# Patient Record
Sex: FEMALE | Race: WHITE | NOT HISPANIC OR LATINO | Employment: UNEMPLOYED | URBAN - METROPOLITAN AREA
[De-identification: names, ages, dates, MRNs, and addresses within clinical notes are randomized per-mention and may not be internally consistent; named-entity substitution may affect disease eponyms.]

---

## 2017-04-06 ENCOUNTER — OFFICE VISIT (OUTPATIENT)
Dept: FAMILY MEDICINE | Facility: CLINIC | Age: 57
End: 2017-04-06

## 2017-04-06 VITALS
SYSTOLIC BLOOD PRESSURE: 142 MMHG | DIASTOLIC BLOOD PRESSURE: 90 MMHG | BODY MASS INDEX: 23.15 KG/M2 | WEIGHT: 125.8 LBS | HEIGHT: 62 IN | HEART RATE: 71 BPM | TEMPERATURE: 97.9 F | OXYGEN SATURATION: 98 %

## 2017-04-06 DIAGNOSIS — F33.1 MODERATE EPISODE OF RECURRENT MAJOR DEPRESSIVE DISORDER (H): Primary | ICD-10-CM

## 2017-04-06 DIAGNOSIS — G47.00 INSOMNIA, UNSPECIFIED: ICD-10-CM

## 2017-04-06 DIAGNOSIS — Z96.649 STATUS POST HIP REPLACEMENT, UNSPECIFIED LATERALITY: ICD-10-CM

## 2017-04-06 DIAGNOSIS — I10 ESSENTIAL HYPERTENSION, BENIGN: ICD-10-CM

## 2017-04-06 DIAGNOSIS — F41.1 GAD (GENERALIZED ANXIETY DISORDER): ICD-10-CM

## 2017-04-06 PROCEDURE — 99213 OFFICE O/P EST LOW 20 MIN: CPT | Performed by: FAMILY MEDICINE

## 2017-04-06 RX ORDER — TRAZODONE HYDROCHLORIDE 50 MG/1
50 TABLET, FILM COATED ORAL
Qty: 90 TABLET | Refills: 1 | Status: SHIPPED | OUTPATIENT
Start: 2017-04-06 | End: 2017-09-06

## 2017-04-06 RX ORDER — LORAZEPAM 0.5 MG/1
0.5 TABLET ORAL EVERY 6 HOURS PRN
Qty: 30 TABLET | Refills: 0 | Status: SHIPPED | OUTPATIENT
Start: 2017-04-06 | End: 2017-05-08

## 2017-04-06 ASSESSMENT — ANXIETY QUESTIONNAIRES
GAD7 TOTAL SCORE: 5
5. BEING SO RESTLESS THAT IT IS HARD TO SIT STILL: NOT AT ALL
2. NOT BEING ABLE TO STOP OR CONTROL WORRYING: SEVERAL DAYS
3. WORRYING TOO MUCH ABOUT DIFFERENT THINGS: SEVERAL DAYS
6. BECOMING EASILY ANNOYED OR IRRITABLE: SEVERAL DAYS
IF YOU CHECKED OFF ANY PROBLEMS ON THIS QUESTIONNAIRE, HOW DIFFICULT HAVE THESE PROBLEMS MADE IT FOR YOU TO DO YOUR WORK, TAKE CARE OF THINGS AT HOME, OR GET ALONG WITH OTHER PEOPLE: SOMEWHAT DIFFICULT
1. FEELING NERVOUS, ANXIOUS, OR ON EDGE: SEVERAL DAYS
7. FEELING AFRAID AS IF SOMETHING AWFUL MIGHT HAPPEN: NOT AT ALL

## 2017-04-06 ASSESSMENT — PATIENT HEALTH QUESTIONNAIRE - PHQ9: 5. POOR APPETITE OR OVEREATING: SEVERAL DAYS

## 2017-04-06 NOTE — PROGRESS NOTES
SUBJECTIVE:                                                    Nathalie Mendosa is a 57 year old female who presents to clinic today for the following health issues:        Hypertension Follow-up      Outpatient blood pressures are being checked at home.  Results are normal.    Low Salt Diet: no added salt       Depression and Anxiety Follow-Up    Status since last visit: Worsened -has been off sertraline for a year but getting worse again-seems to happen this time of year as she realizes what she can't do in active summer months    Other associated symptoms:None    Complicating factors:     Significant life event: Yes-  Still struggles with sequelae of hip replacement     Current substance abuse: None    PHQ-9 SCORE 6/2/2015 2/23/2016 11/9/2016   Total Score 2 - -   Total Score - 2 2     ANGI-7 SCORE 6/18/2015 2/23/2016 11/9/2016   Total Score 6 - -   Total Score - 1 0        PHQ-9  English      PHQ-9   Any Language     GAD7       Amount of exercise or physical activity: 2-3 days/week for an average of 15-30 minutes    Problems taking medications regularly: No    Medication side effects: none    Diet: regular (no restrictions)          Problem list and histories reviewed & adjusted, as indicated.  Additional history: as documented    Patient Active Problem List   Diagnosis     OA (osteoarthritis)     Symptomatic menopausal or female climacteric states     Essential hypertension, benign     Status post hip replacement     Anxiety state     Health Care Home     Pain in joint, pelvic region and thigh     Major depressive disorder     ACP (advance care planning)     Abnormal glucose     ANGI (generalized anxiety disorder)     Insomnia, unspecified type     Past Surgical History:   Procedure Laterality Date     C EYE EXAM ESTABLISHED PT  2009     C MAMMOGRAM, SCREENING  2008     C REVISE TOTAL HIP REPLACEMENT  7/2012    Dr Jeanette ALEMAN TOTAL ABDOM HYSTERECTOMY      Hysterectomy, Total Abdominal  BSO     C TOTAL HIP  "ARTHROPLASTY  9/9/2011    Hip Replacement, Total  L     HC LAPAROSCOPIC MYOMECTOMY, 1 - 4 INTRAMURAL MYOMAS =<250 GM       HCL PAP SMEAR  2008    GYN     SEXA BONE DENS PERIPHERAL  2007     TEST NOT FOUND      In vitro with # proceedures       Social History   Substance Use Topics     Smoking status: Never Smoker     Smokeless tobacco: Never Used     Alcohol use 1.0 oz/week     2 drink(s) per week      Comment: occasional     Family History   Problem Relation Age of Onset     C.A.D. Mother      DIABETES No family hx of      Breast Cancer Maternal Aunt      Cancer - colorectal Paternal Grandmother      Psychotic Disorder Father      anxiety/depression         Allergies   Allergen Reactions     Morphine Swelling     Sulfa Drugs      Septra rash and hives noted on  visit of 3-12-00.     Recent Labs   Lab Test  02/23/16   0943  07/19/14   0956  05/07/13   1010   09/12/11   0700  09/11/11   0635   LDL  123  140*  135*   --    --    --    HDL  67  89  85   --    --    --    TRIG  103  96  105   --    --    --    ALT  11  12  12   --    --    --    CR  0.82  0.82  0.82   < >  0.73  0.85   GFRESTIMATED  79  81  81   --   84  70   GFRESTBLACK   --    --    --    --   >90  85   POTASSIUM  3.9  4.0  4.1   --   4.1  4.4    < > = values in this interval not displayed.      BP Readings from Last 3 Encounters:   04/06/17 142/90   11/09/16 132/88   02/23/16 118/78    Wt Readings from Last 3 Encounters:   04/06/17 57.1 kg (125 lb 12.8 oz)   11/09/16 56.7 kg (125 lb)   02/23/16 58.1 kg (128 lb)                    ROS:  Constitutional, HEENT, cardiovascular, pulmonary, gi and gu systems are negative, except as otherwise noted.    OBJECTIVE:                                                    /90 (BP Location: Left arm, Patient Position: Chair, Cuff Size: Adult Regular)  Pulse 71  Temp 97.9  F (36.6  C) (Oral)  Ht 1.575 m (5' 2\")  Wt 57.1 kg (125 lb 12.8 oz)  SpO2 98%  BMI 23.01 kg/m2  Body mass index is 23.01 kg/(m^2). "   GENERAL: healthy, alert and no distress  RESP: lungs clear to auscultation - no rales, rhonchi or wheezes  CV: regular rate and rhythm, normal S1 S2, no S3 or S4, no murmur, click or rub, no peripheral edema and peripheral pulses strong  ABDOMEN: soft, nontender, no hepatosplenomegaly, no masses and bowel sounds normal  MS: no gross musculoskeletal defects noted, no edema  PSYCH: tearful and anxious    Diagnostic Test Results:  none      ASSESSMENT:                                                        PLAN:                                                    (F33.1) Moderate episode of recurrent major depressive disorder (H)  (primary encounter diagnosis)  Comment: recurrence of symptoms -seems to be hard for her to accept activity limitations post hip issues in summer active outdoor months  Plan: sertraline (ZOLOFT) 50 MG tablet        Restart, I reviewed the risks, benefits, and possible side effects of the medication.  The patient had an opportunity to ask any questions regarding the treatment plan. The patient was encouraged to call my office if any problems.     We discussed the new findings of possibly increased suicidal risk in teens and young adults beginning treatment with SSRI's. Pt is aware of risk and will seek help immediately if develops any suicidal thoughts.     (F41.1) ANGI (generalized anxiety disorder)  Comment: as above- some panicky moments-we agree to use some xanax while waiting for sertraline to take affect  Plan: sertraline (ZOLOFT) 50 MG tablet, LORazepam         (ATIVAN) 0.5 MG tablet        I reviewed the risks, benefits, and possible side effects of the medication.  The patient had an opportunity to ask any questions regarding the treatment plan. The patient was encouraged to call my office if any problems.     (Z96.489) Status post hip replacement, unspecified laterality  Comment: Patient is having persistent symptoms despite previous therapies.   Plan:     (G47.00) Insomnia,  "unspecified  Comment: stable symptomatically   Plan: traZODone (DESYREL) 50 MG tablet        continue current medications at current doses     (I10) Essential hypertension, benign  Comment: borderline today but worked up as above  Plan: Check blood pressure readings outside of the clinic several times per week, write down values, and follow up if elevated within the next several weeks. Blood pressure can be checked at the firestation, drugstore,  or any valid site.     BMI:   Estimated body mass index is 23.01 kg/(m^2) as calculated from the following:    Height as of this encounter: 1.575 m (5' 2\").    Weight as of this encounter: 57.1 kg (125 lb 12.8 oz).         FUTURE APPOINTMENTS:       - Follow-up visit in 3-6 mo    Sandoval Lamb MD  [facility]      "

## 2017-04-06 NOTE — MR AVS SNAPSHOT
After Visit Summary   4/6/2017    Nathalie Mendosa    MRN: [id number]           Patient Information     Date Of Birth          1960        Visit Information        Provider Department      4/6/2017 11:30 AM Sandoval Lamb MD [facility]        Today's Diagnoses     Moderate episode of recurrent major depressive disorder (H)    -  1    ANGI (generalized anxiety disorder)        Status post hip replacement, unspecified laterality        Insomnia, unspecified        Essential hypertension, benign           Follow-ups after your visit        Follow-up notes from your care team     Return in about 6 months (around 10/6/2017).      Who to contact     If you have questions or need follow up information about today's clinic visit or your schedule please contact [facility] directly at [phone].  Normal or non-critical lab and imaging results will be communicated to you by MyChart, letter or phone within 4 business days after the clinic has received the results. If you do not hear from us within 7 days, please contact the clinic through MyChart or phone. If you have a critical or abnormal lab result, we will notify you by phone as soon as possible.  Submit refill requests through [facility] or call your pharmacy and they will forward the refill request to us. Please allow 3 business days for your refill to be completed.          Additional Information About Your Visit        MyChart Information     [facility] gives you secure access to your electronic health record. If you see a primary care provider, you can also send messages to your care team and make appointments. If you have questions, please call your primary care clinic.  If you do not have a primary care provider, please call [phone] and they will assist you.        Care EveryWhere ID     This is your Care EveryWhere ID. This could be used by other organizations to access your [place]  "medical records  [id number]        Your Vitals Were     Pulse Temperature Height Pulse Oximetry BMI (Body Mass Index)       71 97.9  F (36.6  C) (Oral) 1.575 m (5' 2\") 98% 23.01 kg/m2        Blood Pressure from Last 3 Encounters:   04/06/17 142/90   11/09/16 132/88   02/23/16 118/78    Weight from Last 3 Encounters:   04/06/17 57.1 kg (125 lb 12.8 oz)   11/09/16 56.7 kg (125 lb)   02/23/16 58.1 kg (128 lb)              Today, you had the following     No orders found for display         Where to get your medicines      These medications were sent to [facility] [id number] [place] [address]  [address] [facility] [address] [facility] [address]     Phone:  [phone]     sertraline 50 MG tablet    traZODone 50 MG tablet         Some of these will need a paper prescription and others can be bought over the counter.  Ask your nurse if you have questions.     Bring a paper prescription for each of these medications     LORazepam 0.5 MG tablet          Primary Care Provider Office Phone # Fax #    Sandoval Lamb [phone]       [facility] [address] [address]        Thank you!     Thank you for choosing [facility]  for your care. Our goal is always to provide you with excellent care. Hearing back from our patients is one way we can continue to improve our services. Please take a few minutes to complete the written survey that you may receive in the mail after your visit with us. Thank you!             Your Updated Medication List - Protect others around you: Learn how to safely use, store and throw away your medicines at www.disposemymeds.org.          This list is accurate as of: 4/6/17 12:00 PM.  Always use your most recent med list.                   Brand Name Dispense Instructions for use    amoxicillin 500 MG capsule    AMOXIL    4 capsule    4 tabs one hour before " dentist appointment       estradiol 0.05 MG/24HR BIW patch    VIVELLE-DOT    24 patch    Place 1 patch onto the skin twice a week       ibuprofen 800 MG tablet    ADVIL/MOTRIN    42 tablet    Take 1 tablet (800 mg) by mouth every 8 hours as needed for pain       LORazepam 0.5 MG tablet    ATIVAN    30 tablet    Take 1 tablet (0.5 mg) by mouth every 6 hours as needed for anxiety       MULTI-VITAMIN DAILY PO      Take  by mouth.       sertraline 50 MG tablet    ZOLOFT    90 tablet    Take 1 tablet (50 mg) by mouth daily       traMADol 50 MG tablet    ULTRAM         traZODone 50 MG tablet    DESYREL    90 tablet    Take 1 tablet (50 mg) by mouth nightly as needed for sleep

## 2017-04-06 NOTE — NURSING NOTE
Nathalie Mendosa is here today for a non fasting med check    Pre-Visit Screening :  Immunizations : up to date  Colonoscopy : is up to date  Mammogram : is up to date  Asthma Action Test/Plan : NA  PHQ9/GAD7 :  Completed Today  Pulse - regular  My Chart - accepts    CLASSIFICATION OF OVERWEIGHT AND OBESITY BY BMI                         Obesity Class           BMI(kg/m2)  Underweight                                    < 18.5  Normal                                         18.5-24.9  Overweight                                     25.0-29.9  OBESITY                     I                  30.0-34.9                              II                 35.0-39.9  EXTREME OBESITY             III                >40                             Patient's  BMI Body mass index is 23.01 kg/(m^2).  http://hin.nhlbi.nih.gov/menuplanner/menu.cgi  Questioned patient about current smoking habits.  Pt. has never smoked.  Shy Mulligan, CMA

## 2017-04-07 ASSESSMENT — ANXIETY QUESTIONNAIRES: GAD7 TOTAL SCORE: 5

## 2017-04-07 ASSESSMENT — PATIENT HEALTH QUESTIONNAIRE - PHQ9: SUM OF ALL RESPONSES TO PHQ QUESTIONS 1-9: 3

## 2017-04-18 ENCOUNTER — TRANSFERRED RECORDS (OUTPATIENT)
Dept: FAMILY MEDICINE | Facility: CLINIC | Age: 57
End: 2017-04-18

## 2017-05-08 ENCOUNTER — MYC REFILL (OUTPATIENT)
Dept: FAMILY MEDICINE | Facility: CLINIC | Age: 57
End: 2017-05-08

## 2017-05-08 DIAGNOSIS — F41.1 GAD (GENERALIZED ANXIETY DISORDER): ICD-10-CM

## 2017-05-08 RX ORDER — LORAZEPAM 0.5 MG/1
0.5 TABLET ORAL EVERY 6 HOURS PRN
Qty: 15 TABLET | Refills: 0 | Status: SHIPPED | OUTPATIENT
Start: 2017-05-08 | End: 2017-06-15

## 2017-05-08 NOTE — TELEPHONE ENCOUNTER
Patient is requesting a refill of the following:  Pending Prescriptions:                       Disp   Refills    LORazepam (ATIVAN) 0.5 MG tablet          30 tab*0            Sig: Take 1 tablet (0.5 mg) by mouth every 6 hours as           needed for anxiety    Last Refill: 04/06/17  Last Refill Quantity: 30  Last Office Visit Pertaining to Medication: 04/06/17  Recommended Follow Up from Last OV: 3-6 months  Scheduled Office Visit: None     Please Close Encounter If Approved.    Thank You,  Shy Mulligan, JESSE

## 2017-06-20 DIAGNOSIS — N95.1 SYMPTOMATIC MENOPAUSAL OR FEMALE CLIMACTERIC STATES: ICD-10-CM

## 2017-06-20 RX ORDER — ESTRADIOL 0.05 MG/D
PATCH, EXTENDED RELEASE TRANSDERMAL
Qty: 8 PATCH | Refills: 0 | OUTPATIENT
Start: 2017-06-20

## 2017-06-21 RX ORDER — ESTRADIOL 0.05 MG/D
PATCH, EXTENDED RELEASE TRANSDERMAL
Qty: 24 PATCH | Refills: 1 | Status: SHIPPED | OUTPATIENT
Start: 2017-06-21 | End: 2017-12-05

## 2017-06-21 NOTE — TELEPHONE ENCOUNTER
Pt is requesting the following prescription.     Pending Prescriptions:                       Disp   Refills    estradiol (VIVELLE-DOT) 0.05 MG/24HR BIW *24 pat*1            Sig: PLACE 1 PATCH ON THE SKIN TWICE A WEEK    Last written refill: 1/5/2017    Please advise.     Renita.JESSE Perez ([facility])

## 2017-09-06 DIAGNOSIS — G47.00 INSOMNIA, UNSPECIFIED: ICD-10-CM

## 2017-09-06 RX ORDER — TRAZODONE HYDROCHLORIDE 50 MG/1
TABLET, FILM COATED ORAL
Qty: 90 TABLET | Refills: 0 | OUTPATIENT
Start: 2017-09-06

## 2017-09-06 RX ORDER — TRAZODONE HYDROCHLORIDE 50 MG/1
50 TABLET, FILM COATED ORAL
Qty: 30 TABLET | Refills: 0 | COMMUNITY
Start: 2017-09-06 | End: 2017-10-09

## 2017-09-06 NOTE — TELEPHONE ENCOUNTER
[facility]  Trazodone  Pt due for a non fasting ov  es  [phone] (home) [phone] (work)

## 2017-10-09 ENCOUNTER — OFFICE VISIT (OUTPATIENT)
Dept: FAMILY MEDICINE | Facility: CLINIC | Age: 57
End: 2017-10-09

## 2017-10-09 VITALS
HEART RATE: 70 BPM | TEMPERATURE: 99.3 F | WEIGHT: 127 LBS | BODY MASS INDEX: 22.5 KG/M2 | HEIGHT: 63 IN | OXYGEN SATURATION: 98 % | SYSTOLIC BLOOD PRESSURE: 132 MMHG | DIASTOLIC BLOOD PRESSURE: 84 MMHG

## 2017-10-09 DIAGNOSIS — Z23 NEED FOR VACCINATION: ICD-10-CM

## 2017-10-09 DIAGNOSIS — F41.1 GAD (GENERALIZED ANXIETY DISORDER): ICD-10-CM

## 2017-10-09 DIAGNOSIS — Z11.59 NEED FOR HEPATITIS C SCREENING TEST: ICD-10-CM

## 2017-10-09 DIAGNOSIS — I10 ESSENTIAL HYPERTENSION, BENIGN: ICD-10-CM

## 2017-10-09 DIAGNOSIS — Z96.642 STATUS POST LEFT HIP REPLACEMENT: ICD-10-CM

## 2017-10-09 DIAGNOSIS — Z13.220 SCREENING FOR LIPOID DISORDERS: ICD-10-CM

## 2017-10-09 DIAGNOSIS — G47.00 INSOMNIA, UNSPECIFIED TYPE: Primary | ICD-10-CM

## 2017-10-09 DIAGNOSIS — F33.1 MODERATE EPISODE OF RECURRENT MAJOR DEPRESSIVE DISORDER (H): ICD-10-CM

## 2017-10-09 PROCEDURE — 90686 IIV4 VACC NO PRSV 0.5 ML IM: CPT | Performed by: FAMILY MEDICINE

## 2017-10-09 PROCEDURE — 99213 OFFICE O/P EST LOW 20 MIN: CPT | Mod: 25 | Performed by: FAMILY MEDICINE

## 2017-10-09 PROCEDURE — 90471 IMMUNIZATION ADMIN: CPT | Performed by: FAMILY MEDICINE

## 2017-10-09 RX ORDER — TRAZODONE HYDROCHLORIDE 50 MG/1
50 TABLET, FILM COATED ORAL
Qty: 90 TABLET | Refills: 1 | Status: SHIPPED | OUTPATIENT
Start: 2017-10-09 | End: 2018-04-10

## 2017-10-09 ASSESSMENT — ANXIETY QUESTIONNAIRES
3. WORRYING TOO MUCH ABOUT DIFFERENT THINGS: NOT AT ALL
5. BEING SO RESTLESS THAT IT IS HARD TO SIT STILL: NOT AT ALL
IF YOU CHECKED OFF ANY PROBLEMS ON THIS QUESTIONNAIRE, HOW DIFFICULT HAVE THESE PROBLEMS MADE IT FOR YOU TO DO YOUR WORK, TAKE CARE OF THINGS AT HOME, OR GET ALONG WITH OTHER PEOPLE: NOT DIFFICULT AT ALL
7. FEELING AFRAID AS IF SOMETHING AWFUL MIGHT HAPPEN: NOT AT ALL
1. FEELING NERVOUS, ANXIOUS, OR ON EDGE: NOT AT ALL
2. NOT BEING ABLE TO STOP OR CONTROL WORRYING: NOT AT ALL
GAD7 TOTAL SCORE: 0
6. BECOMING EASILY ANNOYED OR IRRITABLE: NOT AT ALL

## 2017-10-09 ASSESSMENT — PATIENT HEALTH QUESTIONNAIRE - PHQ9
SUM OF ALL RESPONSES TO PHQ QUESTIONS 1-9: 1
5. POOR APPETITE OR OVEREATING: NOT AT ALL

## 2017-10-09 NOTE — NURSING NOTE
Nathalie Mendosa is here today for a medication recheck.    Pre-Visit Screening :  Immunizations : up to date, flu shot today  Colonoscopy : is up to date  Mammogram : is up to date  Asthma Action Test/Plan : NA  PHQ9/GAD7 :  Completed Today    Pulse - regular  My Chart - accepts    CLASSIFICATION OF OVERWEIGHT AND OBESITY BY BMI                         Obesity Class           BMI(kg/m2)  Underweight                                    < 18.5  Normal                                         18.5-24.9  Overweight                                     25.0-29.9  OBESITY                     I                  30.0-34.9                              II                 35.0-39.9  EXTREME OBESITY             III                >40                             Patient's  BMI Body mass index is 22.86 kg/(m^2).  http://hin.nhlbi.nih.gov/menuplanner/menu.cgi  Questioned patient about current smoking habits.  Pt. has never smoked.    Shy Mulligan, CMA

## 2017-10-09 NOTE — PROGRESS NOTES
SUBJECTIVE:                                                    Nathalie Mendosa is a 57 year old female who presents to clinic today for the following health issues:      Hypertension Follow-up-not on meds       Outpatient blood pressures are not being checked.    Low Salt Diet: no added salt    Depression and Anxiety Follow-Up    Status since last visit: No change    Other associated symptoms:None    Complicating factors:     Significant life event: No     Current substance abuse: None    PHQ-9 Score and MyChart F/U Questions 2/23/2016 11/9/2016 4/6/2017   Total Score 2 2 3   Q9: Suicide Ideation Not at all Not at all Not at all     ANGI-7 SCORE 2/23/2016 11/9/2016 4/6/2017   Total Score - - -   Total Score 1 0 5       PHQ-9  English  PHQ-9   Any Language  GAD7  Suicide Assessment Five-step Evaluation and Treatment (SAFE-T)        Amount of exercise or physical activity: 4-5 days/week for an average of 15-30 minutes    Problems taking medications regularly: No    Medication side effects: none  Diet: regular (no restrictions)      Hip pain still a challenge- no further therapy options- is having to learn to handle    Problem list and histories reviewed & adjusted, as indicated.  Additional history: as documented    Patient Active Problem List   Diagnosis     OA (osteoarthritis)     Symptomatic menopausal or female climacteric states     Essential hypertension, benign     Status post hip replacement     Anxiety state     Health Care Home     Pain in joint, pelvic region and thigh     Major depressive disorder     ACP (advance care planning)     Abnormal glucose     ANGI (generalized anxiety disorder)     Insomnia, unspecified type     Past Surgical History:   Procedure Laterality Date     C EYE EXAM ESTABLISHED PT  2009     C MAMMOGRAM, SCREENING  2008     C REVISE TOTAL HIP REPLACEMENT  7/2012    Dr Jeanette ALEMAN TOTAL ABDOM HYSTERECTOMY      Hysterectomy, Total Abdominal  BSO     C TOTAL HIP ARTHROPLASTY  9/9/2011     Hip Replacement, Total  L     HC LAPAROSCOPIC MYOMECTOMY, 1 - 4 INTRAMURAL MYOMAS =<250 GM       HCL PAP SMEAR  2008    GYN     SEXA BONE DENS PERIPHERAL  2007     TEST NOT FOUND      In vitro with # proceedures       Social History   Substance Use Topics     Smoking status: Never Smoker     Smokeless tobacco: Never Used     Alcohol use 1.0 oz/week     2 drink(s) per week      Comment: occasional     Family History   Problem Relation Age of Onset     C.A.D. Mother      DIABETES No family hx of      Breast Cancer Maternal Aunt      Cancer - colorectal Paternal Grandmother      Psychotic Disorder Father      anxiety/depression         Current Outpatient Prescriptions   Medication Sig Dispense Refill     traZODone (DESYREL) 50 MG tablet Take 1 tablet (50 mg) by mouth nightly as needed for sleep 90 tablet 1     sertraline (ZOLOFT) 50 MG tablet Take 1 tablet (50 mg) by mouth daily 90 tablet 1     estradiol (VIVELLE-DOT) 0.05 MG/24HR BIW patch PLACE 1 PATCH ON THE SKIN TWICE A WEEK 24 patch 1     Multiple Vitamin (MULTI-VITAMIN DAILY PO) Take  by mouth.       [DISCONTINUED] traZODone (DESYREL) 50 MG tablet Take 1 tablet (50 mg) by mouth nightly as needed for sleep 30 tablet 0     LORazepam (ATIVAN) 0.5 MG tablet Take 1 tablet (0.5 mg) by mouth every 6 hours as needed for anxiety 20 tablet 0     [DISCONTINUED] sertraline (ZOLOFT) 50 MG tablet Take 1 tablet (50 mg) by mouth daily 90 tablet 1     amoxicillin (AMOXIL) 500 MG capsule 4 tabs one hour before dentist appointment 4 capsule 1     traMADol (ULTRAM) 50 MG tablet   3     ibuprofen (ADVIL,MOTRIN) 800 MG tablet Take 1 tablet (800 mg) by mouth every 8 hours as needed for pain 42 tablet 0     Allergies   Allergen Reactions     Morphine Swelling     Sulfa Drugs      Septra rash and hives noted on  visit of 3-12-00.     Recent Labs   Lab Test  02/23/16   0943  07/19/14   0956  05/07/13   1010   09/12/11   0700  09/11/11   0635   LDL  123  140*  135*   --    --    --   "  HDL  67  89  85   --    --    --    TRIG  103  96  105   --    --    --    ALT  11  12  12   --    --    --    CR  0.82  0.82  0.82   < >  0.73  0.85   GFRESTIMATED  79  81  81   --   84  70   GFRESTBLACK   --    --    --    --   >90  85   POTASSIUM  3.9  4.0  4.1   --   4.1  4.4    < > = values in this interval not displayed.      BP Readings from Last 3 Encounters:   10/09/17 132/84   04/06/17 142/90   11/09/16 132/88    Wt Readings from Last 3 Encounters:   10/09/17 57.6 kg (127 lb)   04/06/17 57.1 kg (125 lb 12.8 oz)   11/09/16 56.7 kg (125 lb)                          ROS:  Constitutional, HEENT, cardiovascular, pulmonary, gi and gu systems are negative, except as otherwise noted.      OBJECTIVE:   /84 (BP Location: Right arm, Patient Position: Chair, Cuff Size: Adult Regular)  Pulse 70  Temp 99.3  F (37.4  C) (Oral)  Ht 1.588 m (5' 2.5\")  Wt 57.6 kg (127 lb)  SpO2 98%  Breastfeeding? No  BMI 22.86 kg/m2  Body mass index is 22.86 kg/(m^2).   GENERAL: healthy, alert and no distress  NECK: no adenopathy, no asymmetry, masses, or scars and thyroid normal to palpation  RESP: lungs clear to auscultation - no rales, rhonchi or wheezes  CV: regular rate and rhythm, normal S1 S2, no S3 or S4, no murmur, click or rub, no peripheral edema and peripheral pulses strong  ABDOMEN: soft, nontender, no hepatosplenomegaly, no masses and bowel sounds normal  MS: no gross musculoskeletal defects noted, no edema  PSYCH: mentation appears normal, affect normal/bright        ASSESSMENT:       PLAN:   (G47.00) Insomnia, unspecified type  (primary encounter diagnosis)  Comment: stable  Plan: traZODone (DESYREL) 50 MG tablet        continue current medications at current doses     (F41.1) ANGI (generalized anxiety disorder)  Comment: stable control- feels she does not need sertraline  Plan: sertraline (ZOLOFT) 50 MG tablet        Trial weaning-go to 25 mg for a few days them off    (F33.1) Moderate episode of recurrent " "major depressive disorder (H)  Comment: as above  Plan: sertraline (ZOLOFT) 50 MG tablet            (Z96.642) Status post left hip replacement  Comment: Patient is having persistent symptoms despite previous therapies.   Plan: continue to work on therapy    (Z11.59) Need for hepatitis C screening test  Comment:   Plan: Hepatits C antibody ([facility]), VENOUS COLLECTION            (Z23) Need for vaccination  Comment:   Plan: HC FLU VAC PRESRV FREE QUAD SPLIT VIR 3+YRS IM,        VACCINE ADMINISTRATION, INITIAL            (Z13.220) Screening for lipoid disorders  Comment:   Plan: Lipid Profile ([facility]), VENOUS COLLECTION            (I10) Essential hypertension, benign  Comment: well controlle doff meds now  Plan: COMPREHENSIVE METABOLIC PANEL ([facility]) XCMP,         VENOUS COLLECTION              BMI:   Estimated body mass index is 22.86 kg/(m^2) as calculated from the following:    Height as of this encounter: 1.588 m (5' 2.5\").    Weight as of this encounter: 57.6 kg (127 lb).           FUTURE APPOINTMENTS:       - Follow-up visit in 6 mo  Regular exercise    Sandoval Lamb MD  [facility]  "

## 2017-10-09 NOTE — MR AVS SNAPSHOT
After Visit Summary   10/9/2017    Nathalie Mendosa    MRN: [id number]           Patient Information     Date Of Birth          1960        Visit Information        Provider Department      10/9/2017 10:15 AM Sandoval Lamb MD [facility]        Today's Diagnoses     Insomnia, unspecified type    -  1    ANGI (generalized anxiety disorder)        Moderate episode of recurrent major depressive disorder (H)        Status post left hip replacement        Need for hepatitis C screening test        Need for vaccination        Screening for lipoid disorders        Essential hypertension, benign           Follow-ups after your visit        Follow-up notes from your care team     Return in about 6 months (around 4/9/2018).      Future tests that were ordered for you today     Open Standing Orders        Priority Remaining Interval Expires Ordered    Lipid Profile ([facility]) Routine 1/1  11/9/2017 10/9/2017    COMPREHENSIVE METABOLIC PANEL ([facility]) XCMP Routine 1/1  11/9/2017 10/9/2017    Hepatits C antibody ([facility]) Routine 1/1  11/9/2017 10/9/2017    VENOUS COLLECTION Routine 1/1  11/9/2017 10/9/2017            Who to contact     If you have questions or need follow up information about today's clinic visit or your schedule please contact [facility] directly at [phone].  Normal or non-critical lab and imaging results will be communicated to you by MyChart, letter or phone within 4 business days after the clinic has received the results. If you do not hear from us within 7 days, please contact the clinic through MyChart or phone. If you have a critical or abnormal lab result, we will notify you by phone as soon as possible.  Submit refill requests through 500px or call your pharmacy and they will forward the refill request to us. Please allow 3 business days for your refill to be completed.          Additional Information About Your Visit       "  MyChart Information     [employer] gives you secure access to your electronic health record. If you see a primary care provider, you can also send messages to your care team and make appointments. If you have questions, please call your primary care clinic.  If you do not have a primary care provider, please call [phone] and they will assist you.        Care EveryWhere ID     This is your Care EveryWhere ID. This could be used by other organizations to access your [facility] medical records  [id number]        Your Vitals Were     Pulse Temperature Height Pulse Oximetry Breastfeeding? BMI (Body Mass Index)    70 99.3  F (37.4  C) (Oral) 1.588 m (5' 2.5\") 98% No 22.86 kg/m2       Blood Pressure from Last 3 Encounters:   10/09/17 132/84   04/06/17 142/90   11/09/16 132/88    Weight from Last 3 Encounters:   10/09/17 57.6 kg (127 lb)   04/06/17 57.1 kg (125 lb 12.8 oz)   11/09/16 56.7 kg (125 lb)              We Performed the Following     HC FLU VAC PRESRV FREE QUAD SPLIT VIR 3+YRS IM     VACCINE ADMINISTRATION, INITIAL          Where to get your medicines      These medications were sent to [facility] [address] [place] [address] [address] [place] [address]  [address] [facility] [address] [facility] [address]     Phone:  [phone]     sertraline 50 MG tablet    traZODone 50 MG tablet          Primary Care Provider Office Phone # Fax #    Sandoval Lamb [phone] [address] [address]        Equal Access to Services     JOSE JAMES : Hadbenson Jones, ella beatty, angelito santiago. So [facility] [phone].    ATENCIÓN: Si habla español, tiene a amaya disposición servicios gratuitos de asistencia lingüística. Llame al [phone].    We comply with applicable federal civil rights laws and [place] laws. We do not discriminate on the basis of race, color, national " origin, age, disability, sex, sexual orientation, or gender identity.            Thank you!     Thank you for choosing [facility]  for your care. Our goal is always to provide you with excellent care. Hearing back from our patients is one way we can continue to improve our services. Please take a few minutes to complete the written survey that you may receive in the mail after your visit with us. Thank you!             Your Updated Medication List - Protect others around you: Learn how to safely use, store and throw away your medicines at www.disposemymeds.org.          This list is accurate as of: 10/9/17 12:07 PM.  Always use your most recent med list.                   Brand Name Dispense Instructions for use Diagnosis    amoxicillin 500 MG capsule    AMOXIL    4 capsule    4 tabs one hour before dentist appointment    Status post left hip replacement       estradiol 0.05 MG/24HR BIW patch    VIVELLE-DOT    24 patch    PLACE 1 PATCH ON THE SKIN TWICE A WEEK    Symptomatic menopausal or female climacteric states       ibuprofen 800 MG tablet    ADVIL/MOTRIN    42 tablet    Take 1 tablet (800 mg) by mouth every 8 hours as needed for pain    Pain in joint, pelvic region and thigh, left       LORazepam 0.5 MG tablet    ATIVAN    20 tablet    Take 1 tablet (0.5 mg) by mouth every 6 hours as needed for anxiety    ANGI (generalized anxiety disorder)       MULTI-VITAMIN DAILY PO      Take  by mouth.        sertraline 50 MG tablet    ZOLOFT    90 tablet    Take 1 tablet (50 mg) by mouth daily    Moderate episode of recurrent major depressive disorder (H), ANGI (generalized anxiety disorder)       traMADol 50 MG tablet    ULTRAM          traZODone 50 MG tablet    DESYREL    90 tablet    Take 1 tablet (50 mg) by mouth nightly as needed for sleep    Insomnia, unspecified type

## 2017-10-10 ASSESSMENT — ANXIETY QUESTIONNAIRES: GAD7 TOTAL SCORE: 0

## 2017-12-05 DIAGNOSIS — N95.1 SYMPTOMATIC MENOPAUSAL OR FEMALE CLIMACTERIC STATES: ICD-10-CM

## 2017-12-06 RX ORDER — ESTRADIOL 0.05 MG/D
PATCH, EXTENDED RELEASE TRANSDERMAL
Qty: 24 PATCH | Refills: 0 | Status: SHIPPED | OUTPATIENT
Start: 2017-12-06 | End: 2018-02-28

## 2017-12-06 NOTE — TELEPHONE ENCOUNTER
Pending Prescriptions:                       Disp   Refills    estradiol (VIVELLE-DOT) 0.05 MG/24HR BIW *                    Sig: APPLY 1 PATCH ON THE SKIN TWICE WEEKLY    [facility] please review:    THIS IS MAIL ORDER    Last refill was 1-2017 and 6-  Pt last ov was 10-9-2017  Please change qty, fax, deny or if pt is due for ov, send to FD and let them know if pt needs to be fasting or not  [place]  [phone] (home) [phone] (work)

## 2017-12-28 ENCOUNTER — MYC REFILL (OUTPATIENT)
Dept: FAMILY MEDICINE | Facility: CLINIC | Age: 57
End: 2017-12-28

## 2017-12-28 DIAGNOSIS — F41.1 GAD (GENERALIZED ANXIETY DISORDER): ICD-10-CM

## 2017-12-28 RX ORDER — LORAZEPAM 0.5 MG/1
0.5 TABLET ORAL EVERY 8 HOURS PRN
Qty: 20 TABLET | Refills: 0 | Status: SHIPPED | OUTPATIENT
Start: 2017-12-28 | End: 2018-02-27

## 2017-12-28 RX ORDER — LORAZEPAM 0.5 MG/1
0.5 TABLET ORAL EVERY 6 HOURS PRN
Qty: 20 TABLET | Refills: 0 | Status: SHIPPED | OUTPATIENT
Start: 2017-12-28 | End: 2017-12-28

## 2017-12-28 NOTE — TELEPHONE ENCOUNTER
Pending Prescriptions:                       Disp   Refills    LORazepam (ATIVAN) 0.5 MG tablet          20 tab*0            Sig: Take 1 tablet (0.5 mg) by mouth every 6 hours as           needed for anxiety    This is my chart refill  If pt is due to be seen, send a message to pt on my chart  [phone] (home) [phone] (work)

## 2017-12-28 NOTE — TELEPHONE ENCOUNTER
Message from MyChart:  Nathalie Mendosa would like a refill of the following medications:  LORazepam (ATIVAN) 0.5 MG tablet [SANDRINE DialloC]    Preferred pharmacy: [facility] [address] [place] [address] - [address] [place] [address] [address] AT [facility] 42    Comment:

## 2018-01-02 DIAGNOSIS — I10 ESSENTIAL HYPERTENSION, BENIGN: ICD-10-CM

## 2018-01-02 DIAGNOSIS — Z11.59 NEED FOR HEPATITIS C SCREENING TEST: ICD-10-CM

## 2018-01-02 DIAGNOSIS — Z13.220 SCREENING FOR LIPOID DISORDERS: ICD-10-CM

## 2018-01-02 PROCEDURE — 80053 COMPREHEN METABOLIC PANEL: CPT | Mod: 90 | Performed by: FAMILY MEDICINE

## 2018-01-02 PROCEDURE — 80061 LIPID PANEL: CPT | Mod: 90 | Performed by: FAMILY MEDICINE

## 2018-01-02 PROCEDURE — 86803 HEPATITIS C AB TEST: CPT | Mod: 90 | Performed by: FAMILY MEDICINE

## 2018-01-02 PROCEDURE — 36415 COLL VENOUS BLD VENIPUNCTURE: CPT | Performed by: FAMILY MEDICINE

## 2018-01-03 LAB
ALBUMIN SERPL-MCNC: 4.1 G/DL (ref 3.6–5.1)
ALBUMIN/GLOB SERPL: 1.4 (CALC) (ref 1–2.5)
ALP SERPL-CCNC: 61 U/L (ref 33–130)
ALT SERPL-CCNC: 97 U/L (ref 6–29)
AST SERPL-CCNC: 49 U/L (ref 10–35)
BILIRUB SERPL-MCNC: 0.7 MG/DL (ref 0.2–1.2)
BUN SERPL-MCNC: 12 MG/DL (ref 7–25)
BUN/CREATININE RATIO: ABNORMAL (CALC) (ref 6–22)
CALCIUM SERPL-MCNC: 9.2 MG/DL (ref 8.6–10.4)
CHLORIDE SERPLBLD-SCNC: 101 MMOL/L (ref 98–110)
CHOLEST SERPL-MCNC: 182 MG/DL
CHOLEST/HDLC SERPL: 4 (CALC)
CO2 SERPL-SCNC: 23 MMOL/L (ref 20–31)
CREAT SERPL-MCNC: 0.8 MG/DL (ref 0.5–1.05)
EGFR AFRICAN AMERICAN - QUEST: 94 ML/MIN/1.73M2
GFR SERPL CREATININE-BSD FRML MDRD: 81 ML/MIN/1.73M2
GLOBULIN, CALCULATED - QUEST: 2.9 G/DL (CALC) (ref 1.9–3.7)
GLUCOSE - QUEST: 114 MG/DL (ref 65–99)
HCV AB - QUEST: NORMAL
HDLC SERPL-MCNC: 46 MG/DL
LDLC SERPL CALC-MCNC: 110 MG/DL (CALC)
NONHDLC SERPL-MCNC: 136 MG/DL (CALC)
POTASSIUM SERPL-SCNC: 4.5 MMOL/L (ref 3.5–5.3)
PROT SERPL-MCNC: 7 G/DL (ref 6.1–8.1)
SIGNAL TO CUT OFF - QUEST: 0.01
SODIUM SERPL-SCNC: 136 MMOL/L (ref 135–146)
TRIGL SERPL-MCNC: 146 MG/DL

## 2018-01-19 ENCOUNTER — TRANSFERRED RECORDS (OUTPATIENT)
Dept: FAMILY MEDICINE | Facility: CLINIC | Age: 58
End: 2018-01-19

## 2018-02-27 ENCOUNTER — MYC REFILL (OUTPATIENT)
Dept: FAMILY MEDICINE | Facility: CLINIC | Age: 58
End: 2018-02-27

## 2018-02-27 DIAGNOSIS — F41.1 GAD (GENERALIZED ANXIETY DISORDER): ICD-10-CM

## 2018-02-27 RX ORDER — LORAZEPAM 0.5 MG/1
0.5 TABLET ORAL EVERY 8 HOURS PRN
Qty: 20 TABLET | Refills: 0 | Status: SHIPPED | OUTPATIENT
Start: 2018-02-27 | End: 2018-03-26

## 2018-02-27 NOTE — TELEPHONE ENCOUNTER
Pending Prescriptions:                       Disp   Refills    LORazepam (ATIVAN) 0.5 MG tablet          20 tab*0            Sig: Take 1 tablet (0.5 mg) by mouth every 8 hours as           needed for anxiety    Last refill was 12-  Last ov was 10-9-2017  If pt is due for a ov please send a my chart to pt  Maxwell  [phone] (home) [phone] (work)

## 2018-02-27 NOTE — TELEPHONE ENCOUNTER
Message from MyChart:  Nathalie Mendosa would like a refill of the following medications:  LORazepam (ATIVAN) 0.5 MG tablet [SANDRINE DialloC]    Preferred pharmacy: [facility] [address] [place] [address] - [address] [place] [address] [address] AT [facility] & [facility] 42    Comment:

## 2018-02-28 DIAGNOSIS — N95.1 SYMPTOMATIC MENOPAUSAL OR FEMALE CLIMACTERIC STATES: ICD-10-CM

## 2018-02-28 RX ORDER — ESTRADIOL 0.05 MG/D
PATCH, EXTENDED RELEASE TRANSDERMAL
Qty: 24 PATCH | Refills: 0 | Status: SHIPPED | OUTPATIENT
Start: 2018-02-28 | End: 2018-04-27

## 2018-02-28 NOTE — TELEPHONE ENCOUNTER
Pending Prescriptions:                       Disp   Refills    estradiol (VIVELLE-DOT) 0.05 MG/24HR BIW *       0            Sig: APPLY 1 PATCH TO SKIN TWICE WEEKLY    Last refill was 12-9-2017  Last ov was   Is pt due for a ov?  Please change qty, fax deny or send to FD-OR SEND MESSAGE ON MY CHART TO PT  [place]  [phone] (home) [phone] (work)

## 2018-03-16 DIAGNOSIS — G47.00 INSOMNIA, UNSPECIFIED TYPE: ICD-10-CM

## 2018-03-16 RX ORDER — TRAZODONE HYDROCHLORIDE 50 MG/1
TABLET, FILM COATED ORAL
Qty: 90 TABLET | Refills: 0 | OUTPATIENT
Start: 2018-03-16

## 2018-03-16 NOTE — TELEPHONE ENCOUNTER
lupe in [place]   Trazodone  30 days  Pt due for a non fasting ov  [facility]  [phone] (home) [phone] (work)

## 2018-03-16 NOTE — TELEPHONE ENCOUNTER
Pt states will schedule in April, as she is not out of meds. Informed she would need to address auto refill with her pharmacy to avoid this in the future.

## 2018-03-26 DIAGNOSIS — F41.1 GAD (GENERALIZED ANXIETY DISORDER): ICD-10-CM

## 2018-03-26 RX ORDER — LORAZEPAM 0.5 MG/1
0.5 TABLET ORAL EVERY 8 HOURS PRN
Qty: 10 TABLET | Refills: 0 | Status: SHIPPED | OUTPATIENT
Start: 2018-03-26 | End: 2018-04-10

## 2018-03-26 NOTE — TELEPHONE ENCOUNTER
Nathalie called clinic support stating that she was coming in to see Dr. Lamb 4-10 because she couldn't find a day that worked sooner with his time off and she will run out of her ativan.   Can she have a short supply sent in?  Pt can be reached at [phone]    Pending Prescriptions:                       Disp   Refills    LORazepam (ATIVAN) 0.5 MG tablet          10 tab*0            Sig: Take 1 tablet (0.5 mg) by mouth every 8 hours as           needed for anxiety    Please advise  Anahi

## 2018-04-10 ENCOUNTER — OFFICE VISIT (OUTPATIENT)
Dept: FAMILY MEDICINE | Facility: CLINIC | Age: 58
End: 2018-04-10

## 2018-04-10 VITALS
OXYGEN SATURATION: 99 % | DIASTOLIC BLOOD PRESSURE: 84 MMHG | HEIGHT: 63 IN | WEIGHT: 127.2 LBS | BODY MASS INDEX: 22.54 KG/M2 | HEART RATE: 94 BPM | TEMPERATURE: 97.8 F | SYSTOLIC BLOOD PRESSURE: 128 MMHG

## 2018-04-10 DIAGNOSIS — F33.1 MODERATE EPISODE OF RECURRENT MAJOR DEPRESSIVE DISORDER (H): ICD-10-CM

## 2018-04-10 DIAGNOSIS — F41.1 GAD (GENERALIZED ANXIETY DISORDER): ICD-10-CM

## 2018-04-10 DIAGNOSIS — G47.00 INSOMNIA, UNSPECIFIED TYPE: ICD-10-CM

## 2018-04-10 PROCEDURE — 99213 OFFICE O/P EST LOW 20 MIN: CPT | Performed by: FAMILY MEDICINE

## 2018-04-10 RX ORDER — LORAZEPAM 0.5 MG/1
0.5 TABLET ORAL EVERY 8 HOURS PRN
Qty: 30 TABLET | Refills: 0 | Status: SHIPPED | OUTPATIENT
Start: 2018-04-10 | End: 2018-12-17

## 2018-04-10 RX ORDER — TRAZODONE HYDROCHLORIDE 50 MG/1
50 TABLET, FILM COATED ORAL
Qty: 90 TABLET | Refills: 1 | Status: SHIPPED | OUTPATIENT
Start: 2018-04-10 | End: 2018-09-27

## 2018-04-10 ASSESSMENT — ANXIETY QUESTIONNAIRES
5. BEING SO RESTLESS THAT IT IS HARD TO SIT STILL: NOT AT ALL
GAD7 TOTAL SCORE: 2
6. BECOMING EASILY ANNOYED OR IRRITABLE: NOT AT ALL
7. FEELING AFRAID AS IF SOMETHING AWFUL MIGHT HAPPEN: NOT AT ALL
3. WORRYING TOO MUCH ABOUT DIFFERENT THINGS: SEVERAL DAYS
IF YOU CHECKED OFF ANY PROBLEMS ON THIS QUESTIONNAIRE, HOW DIFFICULT HAVE THESE PROBLEMS MADE IT FOR YOU TO DO YOUR WORK, TAKE CARE OF THINGS AT HOME, OR GET ALONG WITH OTHER PEOPLE: SOMEWHAT DIFFICULT
2. NOT BEING ABLE TO STOP OR CONTROL WORRYING: NOT AT ALL
1. FEELING NERVOUS, ANXIOUS, OR ON EDGE: SEVERAL DAYS

## 2018-04-10 ASSESSMENT — PATIENT HEALTH QUESTIONNAIRE - PHQ9: 5. POOR APPETITE OR OVEREATING: NOT AT ALL

## 2018-04-10 NOTE — MR AVS SNAPSHOT
After Visit Summary   4/10/2018    Nathalie Mendosa    MRN: [id number]           Patient Information     Date Of Birth          1960        Visit Information        Provider Department      4/10/2018 10:00 AM Sandoval Lamb MD [facility]        Today's Diagnoses     Moderate episode of recurrent major depressive disorder (H)        ANGI (generalized anxiety disorder)        Insomnia, unspecified type           Follow-ups after your visit        Follow-up notes from your care team     Return in about 6 months (around 10/10/2018).      Who to contact     If you have questions or need follow up information about today's clinic visit or your schedule please contact [facility] directly at [phone].  Normal or non-critical lab and imaging results will be communicated to you by code-laborationhart, letter or phone within 4 business days after the clinic has received the results. If you do not hear from us within 7 days, please contact the clinic through code-laborationhart or phone. If you have a critical or abnormal lab result, we will notify you by phone as soon as possible.  Submit refill requests through [employer] or call your pharmacy and they will forward the refill request to us. Please allow 3 business days for your refill to be completed.          Additional Information About Your Visit        MyChart Information     [employer] gives you secure access to your electronic health record. If you see a primary care provider, you can also send messages to your care team and make appointments. If you have questions, please call your primary care clinic.  If you do not have a primary care provider, please call [phone] and they will assist you.        Care EveryWhere ID     This is your Care EveryWhere ID. This could be used by other organizations to access your [facility] medical records  [id number]        Your Vitals Were     Pulse Temperature Height Pulse Oximetry  "Breastfeeding? BMI (Body Mass Index)    94 97.8  F (36.6  C) (Oral) 1.588 m (5' 2.5\") 99% No 22.89 kg/m2       Blood Pressure from Last 3 Encounters:   04/10/18 128/84   10/09/17 132/84   04/06/17 142/90    Weight from Last 3 Encounters:   04/10/18 57.7 kg (127 lb 3.2 oz)   10/09/17 57.6 kg (127 lb)   04/06/17 57.1 kg (125 lb 12.8 oz)              Today, you had the following     No orders found for display         Where to get your medicines      These medications were sent to [facility] [address] [address] [place] [address]  [address] [facility] [address]     Phone:  [phone]     sertraline 50 MG tablet    traZODone 50 MG tablet         Some of these will need a paper prescription and others can be bought over the counter.  Ask your nurse if you have questions.     Bring a paper prescription for each of these medications     LORazepam 0.5 MG tablet          Primary Care Provider Office Phone # Fax #    Sandoval Lamb [phone] [address] [facility]  [facility] [address]        Equal Access to Services     JOSE JAMES AH: Hadii adina ku hadasho Soomaali, waaxda luqadaha, qaybta kaalmada adeegyada, waxdeshawn lin hayez smith . So [facility] [phone].    ATENCIÓN: Si habla español, tiene a amaya disposición servicios gratuitos de asistencia lingüística. Llame al [phone].    We comply with applicable federal civil rights laws and [place] laws. We do not discriminate on the basis of race, color, national origin, age, disability, sex, sexual orientation, or gender identity.            Thank you!     Thank you for choosing [facility]  for your care. Our goal is always to provide you with excellent care. Hearing back from our patients is one way we can continue to improve our services. Please take a few minutes to complete the written survey that you may receive in the mail after " your visit with us. Thank you!             Your Updated Medication List - Protect others around you: Learn how to safely use, store and throw away your medicines at www.disposemymeds.org.          This list is accurate as of 4/10/18 10:31 AM.  Always use your most recent med list.                   Brand Name Dispense Instructions for use Diagnosis    amoxicillin 500 MG capsule    AMOXIL    4 capsule    4 tabs one hour before dentist appointment    Status post left hip replacement       estradiol 0.05 MG/24HR BIW patch    VIVELLE-DOT    24 patch    APPLY 1 PATCH TO SKIN TWICE WEEKLY    Symptomatic menopausal or female climacteric states       ibuprofen 800 MG tablet    ADVIL/MOTRIN    42 tablet    Take 1 tablet (800 mg) by mouth every 8 hours as needed for pain    Pain in joint, pelvic region and thigh, left       LORazepam 0.5 MG tablet    ATIVAN    30 tablet    Take 1 tablet (0.5 mg) by mouth every 8 hours as needed for anxiety    ANGI (generalized anxiety disorder)       MULTI-VITAMIN DAILY PO      Take  by mouth.        sertraline 50 MG tablet    ZOLOFT    90 tablet    Take 1 tablet (50 mg) by mouth daily    Moderate episode of recurrent major depressive disorder (H), ANGI (generalized anxiety disorder)       traMADol 50 MG tablet    ULTRAM          traZODone 50 MG tablet    DESYREL    90 tablet    Take 1 tablet (50 mg) by mouth nightly as needed for sleep    Insomnia, unspecified type

## 2018-04-10 NOTE — NURSING NOTE
Nathalie is here for a medication check    Pre-Visit Screening :  Immunizations : up to date  Colonoscopy : is up to date  Mammogram : is up to date  Asthma Action Test/Plan : na  PHQ9/GAD7 :  given    Pulse - regular  My Chart - accepts    CLASSIFICATION OF OVERWEIGHT AND OBESITY BY BMI                         Obesity Class           BMI(kg/m2)  Underweight                                    < 18.5  Normal                                         18.5-24.9  Overweight                                     25.0-29.9  OBESITY                     I                  30.0-34.9                              II                 35.0-39.9  EXTREME OBESITY             III                >40                             Patient's  BMI Body mass index is 22.15 kg/(m^2).  http://hin.nhlbi.nih.gov/menuplanner/menu.cgi  Questioned patient about current smoking habits.  Pt. has never smoked.  The patient has verbalized that it is ok to leave a detailed voice message on the patient's home voicemail with results/recommendations from this visit.       Verified [phone]  phone number:

## 2018-04-10 NOTE — PROGRESS NOTES
SUBJECTIVE:                                                    Nathalie Mendosa is a 58 year old female who presents to clinic today for the following health issues:      Depression and Anxiety Follow-Up    Status since last visit: Worsened slightly with weather and chronic hip pain -was told by ortho nothing else to do    Other associated symptoms:None    Complicating factors:     Significant life event: No     Current substance abuse: None    PHQ-9 11/9/2016 4/6/2017 10/9/2017   Total Score 2 3 1   Q9: Suicide Ideation Not at all Not at all Not at all     ANGI-7 SCORE 11/9/2016 4/6/2017 10/9/2017   Total Score - - -   Total Score 0 5 0       PHQ-9  English  PHQ-9   Any Language  ANGI-7  Suicide Assessment Five-step Evaluation and Treatment (SAFE-T)    Amount of exercise or physical activity: 4-5 days/week for an average of 15-30 minutes    Problems taking medications regularly: No    Medication side effects: none    Diet: regular (no restrictions)        Hip pain persists-no treatments available    Problem list and histories reviewed & adjusted, as indicated.  Additional history: as documented    Patient Active Problem List   Diagnosis     OA (osteoarthritis)     Symptomatic menopausal or female climacteric states     Essential hypertension, benign     Status post hip replacement     Anxiety state     Health Care Home     Pain in joint, pelvic region and thigh     Major depressive disorder     ACP (advance care planning)     Abnormal glucose     ANGI (generalized anxiety disorder)     Insomnia, unspecified type     Past Surgical History:   Procedure Laterality Date     C EYE EXAM ESTABLISHED PT  2009     C MAMMOGRAM, SCREENING  2008     C REVISE TOTAL HIP REPLACEMENT  7/2012    Dr Jeanette ALEMAN TOTAL ABDOM HYSTERECTOMY      Hysterectomy, Total Abdominal  BSO     C TOTAL HIP ARTHROPLASTY  9/9/2011    Hip Replacement, Total  L     HC LAPAROSCOPIC MYOMECTOMY, 1 - 4 INTRAMURAL MYOMAS =<250 GM       HCL PAP SMEAR  2008     GYN     SEXA BONE DENS PERIPHERAL  2007     TEST NOT FOUND      In vitro with # proceedures       Social History   Substance Use Topics     Smoking status: Never Smoker     Smokeless tobacco: Never Used     Alcohol use 1.0 oz/week     2 drink(s) per week      Comment: occasional     Family History   Problem Relation Age of Onset     C.A.D. Mother      DIABETES No family hx of      Breast Cancer Maternal Aunt      Cancer - colorectal Paternal Grandmother      Psychotic Disorder Father      anxiety/depression         Current Outpatient Prescriptions   Medication Sig Dispense Refill     LORazepam (ATIVAN) 0.5 MG tablet Take 1 tablet (0.5 mg) by mouth every 8 hours as needed for anxiety 10 tablet 0     estradiol (VIVELLE-DOT) 0.05 MG/24HR BIW patch APPLY 1 PATCH TO SKIN TWICE WEEKLY 24 patch 0     traZODone (DESYREL) 50 MG tablet Take 1 tablet (50 mg) by mouth nightly as needed for sleep 90 tablet 1     sertraline (ZOLOFT) 50 MG tablet Take 1 tablet (50 mg) by mouth daily 90 tablet 1     amoxicillin (AMOXIL) 500 MG capsule 4 tabs one hour before dentist appointment 4 capsule 1     traMADol (ULTRAM) 50 MG tablet   3     ibuprofen (ADVIL,MOTRIN) 800 MG tablet Take 1 tablet (800 mg) by mouth every 8 hours as needed for pain 42 tablet 0     Multiple Vitamin (MULTI-VITAMIN DAILY PO) Take  by mouth.       Allergies   Allergen Reactions     Morphine Swelling     Sulfa Drugs      Septra rash and hives noted on  visit of 3-12-00.     Recent Labs   Lab Test  01/02/18   0845  02/23/16   0943  07/19/14   0956   09/12/11   0700  09/11/11   0635   LDL  110*  123  140*   < >   --    --    HDL  46*  67  89   < >   --    --    TRIG  146  103  96   < >   --    --    ALT  97*  11  12   < >   --    --    CR  0.80  0.82  0.82   < >  0.73  0.85   GFRESTIMATED  81  79  81   < >  84  70   GFRESTBLACK   --    --    --    --   >90  85   POTASSIUM  4.5  3.9  4.0   < >  4.1  4.4    < > = values in this interval not displayed.      BP Readings  "from Last 3 Encounters:   04/10/18 128/84   10/09/17 132/84   04/06/17 142/90    Wt Readings from Last 3 Encounters:   04/10/18 57.7 kg (127 lb 3.2 oz)   10/09/17 57.6 kg (127 lb)   04/06/17 57.1 kg (125 lb 12.8 oz)                    ROS:  Constitutional, HEENT, cardiovascular, pulmonary, gi and gu systems are negative, except as otherwise noted.    OBJECTIVE:     /84 (BP Location: Right arm, Patient Position: Chair, Cuff Size: Adult Regular)  Pulse 94  Temp 97.8  F (36.6  C) (Oral)  Ht 1.588 m (5' 2.5\")  Wt 57.7 kg (127 lb 3.2 oz)  SpO2 99%  Breastfeeding? No  BMI 22.89 kg/m2  Body mass index is 22.89 kg/(m^2).   GENERAL: healthy, alert and no distress  NECK: no adenopathy, no asymmetry, masses, or scars and thyroid normal to palpation  RESP: lungs clear to auscultation - no rales, rhonchi or wheezes  CV: regular rate and rhythm, normal S1 S2, no S3 or S4, no murmur, click or rub, no peripheral edema and peripheral pulses strong  ABDOMEN: soft, nontender, no hepatosplenomegaly, no masses and bowel sounds normal  MS: no gross musculoskeletal defects noted, no edema  PSYCH: mentation appears normal, affect normal/bright        ASSESSMENT:       PLAN:   (F33.1) Moderate episode of recurrent major depressive disorder (H)  Comment: stable overall-some weather rel;ated issues  Plan: sertraline (ZOLOFT) 50 MG tablet        continue current medications at current doses     (F41.1) ANGI (generalized anxiety disorder)  Comment: stable, uses rare ativan when worked up and cant settle down to sleep-no more than once weekly  Plan: sertraline (ZOLOFT) 50 MG tablet, LORazepam         (ATIVAN) 0.5 MG tablet        We agreee to #30 for 6 months-needs to work toward decreasing    (G47.00) Insomnia, unspecified type  Comment: stable  Plan: traZODone (DESYREL) 50 MG tablet        continue current medications at current doses       BMI:   Estimated body mass index is 22.89 kg/(m^2) as calculated from the following:    " "Height as of this encounter: 1.588 m (5' 2.5\").    Weight as of this encounter: 57.7 kg (127 lb 3.2 oz).         FUTURE APPOINTMENTS:       - Follow-up visit in 6 mo  Work on weight loss  Regular exercise    Sandoval Lamb MD  [facility]      "

## 2018-04-11 ASSESSMENT — ANXIETY QUESTIONNAIRES: GAD7 TOTAL SCORE: 2

## 2018-04-11 ASSESSMENT — PATIENT HEALTH QUESTIONNAIRE - PHQ9: SUM OF ALL RESPONSES TO PHQ QUESTIONS 1-9: 1

## 2018-04-27 ENCOUNTER — MYC REFILL (OUTPATIENT)
Dept: FAMILY MEDICINE | Facility: CLINIC | Age: 58
End: 2018-04-27

## 2018-04-27 DIAGNOSIS — N95.1 SYMPTOMATIC MENOPAUSAL OR FEMALE CLIMACTERIC STATES: ICD-10-CM

## 2018-04-27 NOTE — TELEPHONE ENCOUNTER
Message from [employer]:  Nathalie Mendosa would like a refill of the following medications:  estradiol (VIVELLE-DOT) 0.05 MG/24HR BIW patch [Sandoval Lamb MD]    Preferred pharmacy: [facility] - [place], [place] - [address] [place] [address]    Comment:  [facility] to [facility]. Called today and told me to contact  I had no refills

## 2018-04-30 RX ORDER — ESTRADIOL 0.05 MG/D
1 PATCH, EXTENDED RELEASE TRANSDERMAL
Qty: 24 PATCH | Refills: 0 | Status: SHIPPED | OUTPATIENT
Start: 2018-04-30 | End: 2018-07-25

## 2018-05-15 ENCOUNTER — TRANSFERRED RECORDS (OUTPATIENT)
Dept: FAMILY MEDICINE | Facility: CLINIC | Age: 58
End: 2018-05-15

## 2018-05-24 ENCOUNTER — OFFICE VISIT (OUTPATIENT)
Dept: FAMILY MEDICINE | Facility: CLINIC | Age: 58
End: 2018-05-24

## 2018-05-24 VITALS
HEIGHT: 63 IN | OXYGEN SATURATION: 98 % | HEART RATE: 60 BPM | TEMPERATURE: 97.9 F | BODY MASS INDEX: 22.5 KG/M2 | DIASTOLIC BLOOD PRESSURE: 88 MMHG | WEIGHT: 127 LBS | SYSTOLIC BLOOD PRESSURE: 130 MMHG

## 2018-05-24 DIAGNOSIS — Z87.39 PERSONAL HISTORY OF INFECTED JOINT: Primary | ICD-10-CM

## 2018-05-24 DIAGNOSIS — Z85.828 HISTORY OF BASAL CELL CARCINOMA: ICD-10-CM

## 2018-05-24 LAB
% GRANULOCYTES: 47.8 %
ERYTHROCYTE [SEDIMENTATION RATE] IN BLOOD: 6 MM/HR (ref 0–20)
HCT VFR BLD AUTO: 44.3 % (ref 35–47)
HEMOGLOBIN: 14.9 G/DL (ref 11.7–15.7)
LYMPHOCYTES NFR BLD AUTO: 40.4 %
MCH RBC QN AUTO: 30.7 PG (ref 26–33)
MCHC RBC AUTO-ENTMCNC: 33.6 G/DL (ref 31–36)
MCV RBC AUTO: 91.4 FL (ref 78–100)
MONOCYTES NFR BLD AUTO: 11.8 %
PLATELET COUNT - QUEST: 340 10^9/L (ref 150–375)
RBC # BLD AUTO: 4.85 10*12/L (ref 3.8–5.2)
WBC # BLD AUTO: 6.9 10*9/L (ref 4–11)

## 2018-05-24 PROCEDURE — 85025 COMPLETE CBC W/AUTO DIFF WBC: CPT | Performed by: FAMILY MEDICINE

## 2018-05-24 PROCEDURE — 99213 OFFICE O/P EST LOW 20 MIN: CPT | Performed by: FAMILY MEDICINE

## 2018-05-24 PROCEDURE — 36415 COLL VENOUS BLD VENIPUNCTURE: CPT | Performed by: FAMILY MEDICINE

## 2018-05-24 PROCEDURE — 85651 RBC SED RATE NONAUTOMATED: CPT | Performed by: FAMILY MEDICINE

## 2018-05-24 NOTE — PROGRESS NOTES
"SUBJECTIVE:  Nathalie Mendosa, a 58 year old female scheduled an appointment to discuss the following issues:     Personal history of infected joint  History of basal cell carcinoma  Pt has hx of infected left hip replacement-chronic problems since-pt here today to check for any infection as she has 2 basal cell punches on left thigh recently with Dr Metcalf at dermatology.    Pt very much worries about hip and would like ESR and CBC    No fevers. No new pain.    Medical, social, surgical, and family histories reviewed.    ROS:  CONSTITUTIONAL: NEGATIVE for fever, chills  EYES: NEGATIVE for vision changes   RESP: NEGATIVE for significant cough or SOB  GI: NEGATIVE for nausea, abdominal pain, heartburn, or change in bowel habits  MUSCULOSKELETAL: NEGATIVE for significant arthralgias or myalgia  NEURO: NEGATIVE for weakness, dizziness or paresthesias or headache    OBJECTIVE:  /88 (BP Location: Right arm, Patient Position: Chair, Cuff Size: Adult Regular)  Pulse 60  Temp 97.9  F (36.6  C) (Oral)  Ht 1.588 m (5' 2.5\")  Wt 57.6 kg (127 lb)  SpO2 98%  Breastfeeding? No  BMI 22.86 kg/m2  EXAM:  GENERAL APPEARANCE: healthy, alert and no distress  EYES: EOMI,  PERRL  HENT: ear canals and TM's normal and nose and mouth without ulcers or lesions  RESP: lungs clear to auscultation - no rales, rhonchi or wheezes  CV: regular rates and rhythm, normal S1 S2, no S3 or S4 and no murmur, click or rub -  ABDOMEN:  soft, nontender, no HSM or masses and bowel sounds normal  SKIN: left thight with 2 wounds-healing punch biopsy sites, small rim erythema on both but no spreading erythema, warmth, purulent matter or necorsis    ASSESSMENT/PLAN:  (Z87.39) Personal history of infected joint  (primary encounter diagnosis)  Comment: pt very worries about skin bx near previously infected hip-would like labs to reassure her  Plan: ESR, WESTERGREN (BFP), VENOUS COLLECTION, CL         AFF HEMOGRAM/PLATE/DIFF (BFP)            (Z85.040) " History of basal cell carcinoma  Comment: no signs infection at bx sites  Plan: ESR, WESTERGREN (BFP), VENOUS COLLECTION, CL         AFF HEMOGRAM/PLATE/DIFF (BFP)        reassurred

## 2018-05-24 NOTE — MR AVS SNAPSHOT
"              After Visit Summary   5/24/2018    Nathalie Mendosa    MRN: [id number]           Patient Information     Date Of Birth          1960        Visit Information        Provider Department      5/24/2018 11:45 AM Sandoval Lamb MD [facility]        Today's Diagnoses     Personal history of infected joint    -  1    History of basal cell carcinoma           Follow-ups after your visit        Who to contact     If you have questions or need follow up information about today's clinic visit or your schedule please contact [facility] directly at [phone].  Normal or non-critical lab and imaging results will be communicated to you by MyChart, letter or phone within 4 business days after the clinic has received the results. If you do not hear from us within 7 days, please contact the clinic through TSBhart or phone. If you have a critical or abnormal lab result, we will notify you by phone as soon as possible.  Submit refill requests through TotalHousehold or call your pharmacy and they will forward the refill request to us. Please allow 3 business days for your refill to be completed.          Additional Information About Your Visit        MyChart Information     TotalHousehold gives you secure access to your electronic health record. If you see a primary care provider, you can also send messages to your care team and make appointments. If you have questions, please call your primary care clinic.  If you do not have a primary care provider, please call [phone] and they will assist you.        Care EveryWhere ID     This is your Care EveryWhere ID. This could be used by other organizations to access your [place] medical records  [id number]        Your Vitals Were     Pulse Temperature Height Pulse Oximetry Breastfeeding? BMI (Body Mass Index)    60 97.9  F (36.6  C) (Oral) 1.588 m (5' 2.5\") 98% No 22.86 kg/m2       Blood Pressure from Last 3 Encounters: "   05/24/18 130/88   04/10/18 128/84   10/09/17 132/84    Weight from Last 3 Encounters:   05/24/18 57.6 kg (127 lb)   04/10/18 57.7 kg (127 lb 3.2 oz)   10/09/17 57.6 kg (127 lb)              We Performed the Following     CL AFF HEMOGRAM/PLATE/DIFF (BFP)     ESR, WESTERGREN (BFP)     VENOUS COLLECTION        Primary Care Provider Office Phone # Fax #    Sandoval Mikhail Lamb [phone] [address] [place] [employer]   [facility] [address]        Equal Access to Services     [facility]: Hadii aad ku hadasho Soserge, waaxda luqadaha, qaybta kaalmada adeegyada, angelito smith . So [facility] [phone].    ATENCIÓN: Si habla español, tiene a amaya disposición servicios gratuitos de asistencia lingüística. Ll[facility] [phone].    We comply with applicable federal civil rights laws and [place] laws. We do not discriminate on the basis of race, color, national origin, age, disability, sex, sexual orientation, or gender identity.            Thank you!     Thank you for choosing [facility]  for your care. Our goal is always to provide you with excellent care. Hearing back from our patients is one way we can continue to improve our services. Please take a few minutes to complete the written survey that you may receive in the mail after your visit with us. Thank you!             Your Updated Medication List - Protect others around you: Learn how to safely use, store and throw away your medicines at www.disposemymeds.org.          This list is accurate as of 5/24/18 12:07 PM.  Always use your most recent med list.                   Brand Name Dispense Instructions for use Diagnosis    amoxicillin 500 MG capsule    AMOXIL    4 capsule    4 tabs one hour before dentist appointment    Status post left hip replacement       estradiol 0.05 MG/24HR BIW patch    VIVELLE-DOT    24 patch    Place 1 patch onto the skin twice a week    Symptomatic menopausal or  female climacteric states       ibuprofen 800 MG tablet    ADVIL/MOTRIN    42 tablet    Take 1 tablet (800 mg) by mouth every 8 hours as needed for pain    Pain in joint, pelvic region and thigh, left       LORazepam 0.5 MG tablet    ATIVAN    30 tablet    Take 1 tablet (0.5 mg) by mouth every 8 hours as needed for anxiety    ANGI (generalized anxiety disorder)       MULTI-VITAMIN DAILY PO      Take  by mouth.        sertraline 50 MG tablet    ZOLOFT    90 tablet    Take 1 tablet (50 mg) by mouth daily    Moderate episode of recurrent major depressive disorder (H), ANGI (generalized anxiety disorder)       traMADol 50 MG tablet    ULTRAM          traZODone 50 MG tablet    DESYREL    90 tablet    Take 1 tablet (50 mg) by mouth nightly as needed for sleep    Insomnia, unspecified type

## 2018-05-31 ENCOUNTER — TRANSFERRED RECORDS (OUTPATIENT)
Dept: FAMILY MEDICINE | Facility: CLINIC | Age: 58
End: 2018-05-31

## 2018-07-25 DIAGNOSIS — N95.1 SYMPTOMATIC MENOPAUSAL OR FEMALE CLIMACTERIC STATES: ICD-10-CM

## 2018-07-26 RX ORDER — ESTRADIOL 0.05 MG/D
PATCH, EXTENDED RELEASE TRANSDERMAL
Qty: 24 PATCH | Refills: 0 | Status: SHIPPED | OUTPATIENT
Start: 2018-07-26 | End: 2018-10-18

## 2018-07-26 NOTE — TELEPHONE ENCOUNTER
Pending Prescriptions:                       Disp   Refills    estradiol (VIVELLE-DOT) 0.05 MG/24HR BIW *                    Sig: PLACE 1 PATCH ON THE SKIN TWICE A WEEK      THIS IS MAIL ORDER:  REVIEW:     Last two ov 4-10/5-25  MED CHECK ON 4-10  Last refill was 4-  Please fax deny or send to DARLYN Arreola  [phone] (home) [phone] (work)

## 2018-08-31 ENCOUNTER — TRANSFERRED RECORDS (OUTPATIENT)
Dept: FAMILY MEDICINE | Facility: CLINIC | Age: 58
End: 2018-08-31

## 2018-09-27 ENCOUNTER — OFFICE VISIT (OUTPATIENT)
Dept: FAMILY MEDICINE | Facility: CLINIC | Age: 58
End: 2018-09-27

## 2018-09-27 VITALS
WEIGHT: 129.4 LBS | BODY MASS INDEX: 23.29 KG/M2 | DIASTOLIC BLOOD PRESSURE: 92 MMHG | OXYGEN SATURATION: 100 % | HEART RATE: 66 BPM | TEMPERATURE: 98.3 F | SYSTOLIC BLOOD PRESSURE: 143 MMHG

## 2018-09-27 DIAGNOSIS — G47.00 INSOMNIA, UNSPECIFIED TYPE: ICD-10-CM

## 2018-09-27 DIAGNOSIS — F41.1 GAD (GENERALIZED ANXIETY DISORDER): ICD-10-CM

## 2018-09-27 DIAGNOSIS — Z96.642 STATUS POST LEFT HIP REPLACEMENT: ICD-10-CM

## 2018-09-27 DIAGNOSIS — Z23 NEED FOR VACCINATION: Primary | ICD-10-CM

## 2018-09-27 DIAGNOSIS — F33.1 MODERATE EPISODE OF RECURRENT MAJOR DEPRESSIVE DISORDER (H): ICD-10-CM

## 2018-09-27 PROCEDURE — 99213 OFFICE O/P EST LOW 20 MIN: CPT | Mod: 25 | Performed by: FAMILY MEDICINE

## 2018-09-27 PROCEDURE — 90686 IIV4 VACC NO PRSV 0.5 ML IM: CPT | Performed by: FAMILY MEDICINE

## 2018-09-27 PROCEDURE — 90471 IMMUNIZATION ADMIN: CPT | Performed by: FAMILY MEDICINE

## 2018-09-27 RX ORDER — TRAZODONE HYDROCHLORIDE 50 MG/1
50 TABLET, FILM COATED ORAL
Qty: 90 TABLET | Refills: 1 | Status: SHIPPED | OUTPATIENT
Start: 2018-09-27 | End: 2019-02-26

## 2018-09-27 RX ORDER — AMOXICILLIN 500 MG/1
CAPSULE ORAL
Qty: 4 CAPSULE | Refills: 1 | Status: SHIPPED | OUTPATIENT
Start: 2018-09-27 | End: 2020-03-05

## 2018-09-27 ASSESSMENT — ANXIETY QUESTIONNAIRES
2. NOT BEING ABLE TO STOP OR CONTROL WORRYING: NOT AT ALL
3. WORRYING TOO MUCH ABOUT DIFFERENT THINGS: SEVERAL DAYS
6. BECOMING EASILY ANNOYED OR IRRITABLE: SEVERAL DAYS
5. BEING SO RESTLESS THAT IT IS HARD TO SIT STILL: NOT AT ALL
IF YOU CHECKED OFF ANY PROBLEMS ON THIS QUESTIONNAIRE, HOW DIFFICULT HAVE THESE PROBLEMS MADE IT FOR YOU TO DO YOUR WORK, TAKE CARE OF THINGS AT HOME, OR GET ALONG WITH OTHER PEOPLE: NOT DIFFICULT AT ALL
1. FEELING NERVOUS, ANXIOUS, OR ON EDGE: NOT AT ALL
7. FEELING AFRAID AS IF SOMETHING AWFUL MIGHT HAPPEN: NOT AT ALL
GAD7 TOTAL SCORE: 2

## 2018-09-27 ASSESSMENT — PATIENT HEALTH QUESTIONNAIRE - PHQ9: 5. POOR APPETITE OR OVEREATING: NOT AT ALL

## 2018-09-27 NOTE — PROGRESS NOTES
SUBJECTIVE:                                                    Nathalie Mendosa is a 58 year old female who presents to clinic today for the following health issues:      Depression and Anxiety Follow-Up    Status since last visit: No change    Other associated symptoms:None    Complicating factors:     Significant life event: Yes-  Still stressed by hip pain, also infected BCC removal leg     Current substance abuse: None    PHQ-9 4/6/2017 10/9/2017 4/10/2018   Total Score 3 1 1   Q9: Suicide Ideation Not at all Not at all Not at all     ANGI-7 SCORE 4/6/2017 10/9/2017 4/10/2018   Total Score - - -   Total Score 5 0 2       PHQ-9  English  PHQ-9   Any Language  ANGI-7  Suicide Assessment Five-step Evaluation and Treatment (SAFE-T)    Amount of exercise or physical activity: 4-5 days/week for an average of 30-45 minutes    Problems taking medications regularly: No    Medication side effects: none    Diet: regular (no restrictions)        Pt also noting skin reaction from estradiol patch, when stopped had headaches and nausea-retried patch and no improvement in symptoms     Pt also with some post nasal drip and slight congestion    Problem list and histories reviewed & adjusted, as indicated.  Additional history: as documented    Patient Active Problem List   Diagnosis     OA (osteoarthritis)     Symptomatic menopausal or female climacteric states     Essential hypertension, benign     Status post hip replacement     Anxiety state     Health Care Home     Pain in joint, pelvic region and thigh     Major depressive disorder     ACP (advance care planning)     Abnormal glucose     ANGI (generalized anxiety disorder)     Insomnia, unspecified type     Past Surgical History:   Procedure Laterality Date     C EYE EXAM ESTABLISHED PT  2009     C MAMMOGRAM, SCREENING  2008     C REVISE TOTAL HIP REPLACEMENT  7/2012    Dr Jeanette ALEMAN TOTAL ABDOM HYSTERECTOMY      Hysterectomy, Total Abdominal  BSO     C TOTAL HIP ARTHROPLASTY   9/9/2011    Hip Replacement, Total  L     HC LAPAROSCOPIC MYOMECTOMY, 1 - 4 INTRAMURAL MYOMAS =<250 GM       HCL PAP SMEAR  2008    GYN     SEXA BONE DENS PERIPHERAL  2007     TEST NOT FOUND      In vitro with # proceedures       Social History   Substance Use Topics     Smoking status: Never Smoker     Smokeless tobacco: Never Used     Alcohol use 1.0 oz/week     2 drink(s) per week      Comment: occasional     Family History   Problem Relation Age of Onset     C.A.D. Mother      Diabetes No family hx of      Breast Cancer Maternal Aunt      Cancer - colorectal Paternal Grandmother      Psychotic Disorder Father      anxiety/depression         Current Outpatient Prescriptions   Medication Sig Dispense Refill     amoxicillin (AMOXIL) 500 MG capsule 4 tabs one hour before dentist appointment 4 capsule 1     estradiol (VIVELLE-DOT) 0.05 MG/24HR BIW patch PLACE 1 PATCH ON THE SKIN TWICE A WEEK 24 patch 0     LORazepam (ATIVAN) 0.5 MG tablet Take 1 tablet (0.5 mg) by mouth every 8 hours as needed for anxiety 30 tablet 0     Multiple Vitamin (MULTI-VITAMIN DAILY PO) Take  by mouth.       sertraline (ZOLOFT) 50 MG tablet Take 1 tablet (50 mg) by mouth daily 90 tablet 1     traZODone (DESYREL) 50 MG tablet Take 1 tablet (50 mg) by mouth nightly as needed for sleep 90 tablet 1     ibuprofen (ADVIL,MOTRIN) 800 MG tablet Take 1 tablet (800 mg) by mouth every 8 hours as needed for pain 42 tablet 0     Allergies   Allergen Reactions     Morphine Swelling     Sulfa Drugs      Septra rash and hives noted on  visit of 3-12-00.     Recent Labs   Lab Test  01/02/18   0845  02/23/16   0943  07/19/14   0956   09/12/11   0700  09/11/11   0635   LDL  110*  123  140*   < >   --    --    HDL  46*  67  89   < >   --    --    TRIG  146  103  96   < >   --    --    ALT  97*  11  12   < >   --    --    CR  0.80  0.82  0.82   < >  0.73  0.85   GFRESTIMATED  81  79  81   < >  84  70   GFRESTBLACK   --    --    --    --   >90  85   POTASSIUM   4.5  3.9  4.0   < >  4.1  4.4    < > = values in this interval not displayed.      BP Readings from Last 3 Encounters:   09/27/18 (!) 143/92   05/24/18 130/88   04/10/18 128/84    Wt Readings from Last 3 Encounters:   09/27/18 58.7 kg (129 lb 6.4 oz)   05/24/18 57.6 kg (127 lb)   04/10/18 57.7 kg (127 lb 3.2 oz)                    ROS:  Constitutional, HEENT, cardiovascular, pulmonary, gi and gu systems are negative, except as otherwise noted.    OBJECTIVE:     BP (!) 143/92 (BP Location: Left arm, Patient Position: Sitting, Cuff Size: Adult Regular)  Pulse 66  Temp 98.3  F (36.8  C) (Oral)  Wt 58.7 kg (129 lb 6.4 oz)  SpO2 100%  BMI 23.29 kg/m2  Body mass index is 23.29 kg/(m^2).   GENERAL: healthy, alert and no distress  NECK: no adenopathy, no asymmetry, masses, or scars and thyroid normal to palpation  RESP: lungs clear to auscultation - no rales, rhonchi or wheezes  CV: regular rate and rhythm, normal S1 S2, no S3 or S4, no murmur, click or rub, no peripheral edema and peripheral pulses strong  ABDOMEN: soft, nontender, no hepatosplenomegaly, no masses and bowel sounds normal  MS: no gross musculoskeletal defects noted, no edema  PSYCH: mentation appears normal, affect normal/bright    Diagnostic Test Results:  none     ASSESSMENT:       PLAN:   (Z96.642) Status post left hip replacement  Comment: recommended abx for dental  Plan: amoxicillin (AMOXIL) 500 MG capsule            (G47.00) Insomnia, unspecified type  Comment: stable  Plan: traZODone (DESYREL) 50 MG tablet        continue current medications at current doses     (F33.1) Moderate episode of recurrent major depressive disorder (H)  Comment: stable  Plan: sertraline (ZOLOFT) 50 MG tablet        continue current medications at current doses     (F41.1) ANGI (generalized anxiety disorder)  Comment: stable  Plan: sertraline (ZOLOFT) 50 MG tablet        continue current medications at current doses     Ha post stopping estradiol patch-suspect  "allergy/congestion-recommend trial claritin D    If bad menopausal symptoms consider oral estrogen but would have her d/w GYN-she prefers to stay off for now      BMI:   Estimated body mass index is 23.29 kg/(m^2) as calculated from the following:    Height as of 5/24/18: 1.588 m (5' 2.5\").    Weight as of this encounter: 58.7 kg (129 lb 6.4 oz).         FUTURE APPOINTMENTS:       - Follow-up visit in 6 mo  Regular exercise    Sandoval Lamb MD  [facility]      "

## 2018-09-27 NOTE — NURSING NOTE
Nathalie is here for a med check      Pre-visit Screening:  Immunizations:  up to date  Colonoscopy:  is up to date  Mammogram: is up to date  Asthma Action Test/Plan:  NA  PHQ9:  Done today  GAD7:  Done today  Questioned patient about current smoking habits Pt. has never smoked.  Ok to leave detailed message on voice mail for today's visit only Yes, phone # [phone]

## 2018-09-27 NOTE — MR AVS SNAPSHOT
After Visit Summary   9/27/2018    Nathalie Mendosa    MRN: [id number]           Patient Information     Date Of Birth          1960        Visit Information        Provider Department      9/27/2018 11:30 AM Sandoval Lamb MD [facility]        Today's Diagnoses     Status post left hip replacement        Insomnia, unspecified type        Moderate episode of recurrent major depressive disorder (H)        ANGI (generalized anxiety disorder)           Follow-ups after your visit        Who to contact     If you have questions or need follow up information about today's clinic visit or your schedule please contact [facility] directly at [phone].  Normal or non-critical lab and imaging results will be communicated to you by MyChart, letter or phone within 4 business days after the clinic has received the results. If you do not hear from us within 7 days, please contact the clinic through DFinehart or phone. If you have a critical or abnormal lab result, we will notify you by phone as soon as possible.  Submit refill requests through Faveous or call your pharmacy and they will forward the refill request to us. Please allow 3 business days for your refill to be completed.          Additional Information About Your Visit        MyChart Information     Faveous gives you secure access to your electronic health record. If you see a primary care provider, you can also send messages to your care team and make appointments. If you have questions, please call your primary care clinic.  If you do not have a primary care provider, please call [phone] and they will assist you.        Care EveryWhere ID     This is your Care EveryWhere ID. This could be used by other organizations to access your [place] medical records  [id number]        Your Vitals Were     Pulse Temperature Pulse Oximetry BMI (Body Mass Index)          66 98.3  F (36.8  C) (Oral)  100% 23.29 kg/m2         Blood Pressure from Last 3 Encounters:   09/27/18 (!) 143/92   05/24/18 130/88   04/10/18 128/84    Weight from Last 3 Encounters:   09/27/18 58.7 kg (129 lb 6.4 oz)   05/24/18 57.6 kg (127 lb)   04/10/18 57.7 kg (127 lb 3.2 oz)              Today, you had the following     No orders found for display         Where to get your medicines      These medications were sent to [id number] - [place], [place] - [address] AT [facility] [address]  [address], [facility] [address]     Phone:  [phone]     amoxicillin 500 MG capsule    sertraline 50 MG tablet    traZODone 50 MG tablet          Primary Care Provider Office Phone # Fax #    Sandoval Lamb [phone] [address] [facility] [address]  [facility] [address]        Equal Access to Services     [facility]: Hadii aad ku hadasho Soomaali, waaxda luqadaha, qaybta kaalmada adeegyada, waxay idiin haysamn duke smith . So [facility] [phone].    ATENCIÓN: Si habla español, tiene a amaya disposición servicios gratuitos de asistencia lingüística. Ll[facility] [phone].    We comply with applicable federal civil rights laws and [place] laws. We do not discriminate on the basis of race, color, national origin, age, disability, sex, sexual orientation, or gender identity.            Thank you!     Thank you for choosing [facility]  for your care. Our goal is always to provide you with excellent care. Hearing back from our patients is one way we can continue to improve our services. Please take a few minutes to complete the written survey that you may receive in the mail after your visit with us. Thank you!             Your Updated Medication List - Protect others around you: Learn how to safely use, store and throw away your medicines at www.disposemymeds.org.          This list is accurate as of 9/27/18 12:02 PM.  Always use your most recent med  list.                   Brand Name Dispense Instructions for use Diagnosis    amoxicillin 500 MG capsule    AMOXIL    4 capsule    4 tabs one hour before dentist appointment    Status post left hip replacement       estradiol 0.05 MG/24HR BIW patch    VIVELLE-DOT    24 patch    PLACE 1 PATCH ON THE SKIN TWICE A WEEK    Symptomatic menopausal or female climacteric states       ibuprofen 800 MG tablet    ADVIL/MOTRIN    42 tablet    Take 1 tablet (800 mg) by mouth every 8 hours as needed for pain    Pain in joint, pelvic region and thigh, left       LORazepam 0.5 MG tablet    ATIVAN    30 tablet    Take 1 tablet (0.5 mg) by mouth every 8 hours as needed for anxiety    ANGI (generalized anxiety disorder)       MULTI-VITAMIN DAILY PO      Take  by mouth.        sertraline 50 MG tablet    ZOLOFT    90 tablet    Take 1 tablet (50 mg) by mouth daily    Moderate episode of recurrent major depressive disorder (H), ANGI (generalized anxiety disorder)       traZODone 50 MG tablet    DESYREL    90 tablet    Take 1 tablet (50 mg) by mouth nightly as needed for sleep    Insomnia, unspecified type

## 2018-09-28 ASSESSMENT — PATIENT HEALTH QUESTIONNAIRE - PHQ9: SUM OF ALL RESPONSES TO PHQ QUESTIONS 1-9: 0

## 2018-09-28 ASSESSMENT — ANXIETY QUESTIONNAIRES: GAD7 TOTAL SCORE: 2

## 2018-10-04 ENCOUNTER — MYC MEDICAL ADVICE (OUTPATIENT)
Dept: FAMILY MEDICINE | Facility: CLINIC | Age: 58
End: 2018-10-04

## 2018-10-04 DIAGNOSIS — J01.90 ACUTE SINUSITIS WITH SYMPTOMS > 10 DAYS: Primary | ICD-10-CM

## 2018-10-04 NOTE — TELEPHONE ENCOUNTER
From: Nathalie Mendosa  To: Sandoval Lamb MD  Sent: 10/4/2018 10:27 AM CDT  Subject: A follow-up question for a visit within the past seven days    Dr. Lamb,    Requesting a antiboic for my sinus issues. Been taking Claritin. No change, I have low grade fever, body aches, very fatigue and night sweats. Feel over all lousy.     Kind regards,  Nathalie Mendosa

## 2018-10-18 DIAGNOSIS — N95.1 SYMPTOMATIC MENOPAUSAL OR FEMALE CLIMACTERIC STATES: ICD-10-CM

## 2018-10-19 NOTE — TELEPHONE ENCOUNTER
Pending Prescriptions:                       Disp   Refills    estradiol (VIVELLE-DOT) 0.05 MG/24HR BIW *                    Sig: APPLY 1 PATCH ON THE SKIN TWICE A WEEK    This is MAIL ORDER    Last refill was 7-  Pt had a med check on 9- rtc in six months  No recent blood work  Please change qty and fax  [facility]  [phone] (home) [phone] (work)

## 2018-10-21 RX ORDER — ESTRADIOL 0.05 MG/D
PATCH, EXTENDED RELEASE TRANSDERMAL
Qty: 24 PATCH | Refills: 1 | Status: SHIPPED | OUTPATIENT
Start: 2018-10-21 | End: 2019-04-07

## 2018-11-07 ENCOUNTER — TRANSFERRED RECORDS (OUTPATIENT)
Dept: FAMILY MEDICINE | Facility: CLINIC | Age: 58
End: 2018-11-07

## 2018-12-17 ENCOUNTER — MYC REFILL (OUTPATIENT)
Dept: FAMILY MEDICINE | Facility: CLINIC | Age: 58
End: 2018-12-17

## 2018-12-17 DIAGNOSIS — F41.1 GAD (GENERALIZED ANXIETY DISORDER): ICD-10-CM

## 2018-12-17 NOTE — TELEPHONE ENCOUNTER
Nathalie Mendosa is requesting a refill of:    Pending Prescriptions:                       Disp   Refills    LORazepam (ATIVAN) 0.5 MG tablet          30 tab*0            Sig: Take 1 tablet (0.5 mg) by mouth every 8 hours as           needed for anxiety    Last filled 4/10/18 for #30  Please advise

## 2018-12-18 RX ORDER — LORAZEPAM 0.5 MG/1
0.5 TABLET ORAL EVERY 8 HOURS PRN
Qty: 30 TABLET | Refills: 0 | Status: SHIPPED | OUTPATIENT
Start: 2018-12-18 | End: 2019-07-23

## 2019-02-26 ENCOUNTER — OFFICE VISIT (OUTPATIENT)
Dept: FAMILY MEDICINE | Facility: CLINIC | Age: 59
End: 2019-02-26

## 2019-02-26 VITALS
DIASTOLIC BLOOD PRESSURE: 84 MMHG | HEIGHT: 63 IN | WEIGHT: 127 LBS | RESPIRATION RATE: 20 BRPM | SYSTOLIC BLOOD PRESSURE: 116 MMHG | TEMPERATURE: 98.2 F | HEART RATE: 72 BPM | BODY MASS INDEX: 22.5 KG/M2

## 2019-02-26 DIAGNOSIS — I10 ESSENTIAL HYPERTENSION, BENIGN: Primary | ICD-10-CM

## 2019-02-26 DIAGNOSIS — F41.1 GAD (GENERALIZED ANXIETY DISORDER): ICD-10-CM

## 2019-02-26 DIAGNOSIS — G47.00 INSOMNIA, UNSPECIFIED TYPE: ICD-10-CM

## 2019-02-26 DIAGNOSIS — F33.1 MODERATE EPISODE OF RECURRENT MAJOR DEPRESSIVE DISORDER (H): ICD-10-CM

## 2019-02-26 DIAGNOSIS — R79.89 ELEVATED LFTS: ICD-10-CM

## 2019-02-26 PROCEDURE — 80053 COMPREHEN METABOLIC PANEL: CPT | Mod: 90 | Performed by: FAMILY MEDICINE

## 2019-02-26 PROCEDURE — 99213 OFFICE O/P EST LOW 20 MIN: CPT | Performed by: FAMILY MEDICINE

## 2019-02-26 PROCEDURE — 36415 COLL VENOUS BLD VENIPUNCTURE: CPT | Performed by: FAMILY MEDICINE

## 2019-02-26 PROCEDURE — 80061 LIPID PANEL: CPT | Mod: 90 | Performed by: FAMILY MEDICINE

## 2019-02-26 RX ORDER — TRAZODONE HYDROCHLORIDE 50 MG/1
50 TABLET, FILM COATED ORAL
Qty: 90 TABLET | Refills: 1 | Status: SHIPPED | OUTPATIENT
Start: 2019-02-26 | End: 2019-07-24

## 2019-02-26 ASSESSMENT — MIFFLIN-ST. JEOR: SCORE: 1107.26

## 2019-02-26 ASSESSMENT — PATIENT HEALTH QUESTIONNAIRE - PHQ9
SUM OF ALL RESPONSES TO PHQ QUESTIONS 1-9: 0
5. POOR APPETITE OR OVEREATING: SEVERAL DAYS

## 2019-02-26 ASSESSMENT — ANXIETY QUESTIONNAIRES
IF YOU CHECKED OFF ANY PROBLEMS ON THIS QUESTIONNAIRE, HOW DIFFICULT HAVE THESE PROBLEMS MADE IT FOR YOU TO DO YOUR WORK, TAKE CARE OF THINGS AT HOME, OR GET ALONG WITH OTHER PEOPLE: NOT DIFFICULT AT ALL
7. FEELING AFRAID AS IF SOMETHING AWFUL MIGHT HAPPEN: NOT AT ALL
1. FEELING NERVOUS, ANXIOUS, OR ON EDGE: SEVERAL DAYS
2. NOT BEING ABLE TO STOP OR CONTROL WORRYING: NOT AT ALL
6. BECOMING EASILY ANNOYED OR IRRITABLE: NOT AT ALL
3. WORRYING TOO MUCH ABOUT DIFFERENT THINGS: NOT AT ALL
5. BEING SO RESTLESS THAT IT IS HARD TO SIT STILL: NOT AT ALL
GAD7 TOTAL SCORE: 2

## 2019-02-26 NOTE — NURSING NOTE
Nathalie Mendosa is here for a medication check and refill.  Questioned patient about current smoking habits.  Pt. has never smoked.  PULSE regular  My Chart: active  CLASSIFICATION OF OVERWEIGHT AND OBESITY BY BMI                        Obesity Class           BMI(kg/m2)  Underweight                                    < 18.5  Normal                                         18.5-24.9  Overweight                                     25.0-29.9  OBESITY                     I                  30.0-34.9                             II                 35.0-39.9  EXTREME OBESITY             III                >40                            Patient's  BMI Body mass index is 22.86 kg/m .  http://hin.nhlbi.nih.gov/menuplanner/menu.cgi  Pre-visit planning  Immunizations - up to date  Colonoscopy - is up to date  Mammogram - is up to date  Asthma -   PHQ9 -  Completed today  ANGI-7 -  Completed today

## 2019-02-26 NOTE — PROGRESS NOTES
SUBJECTIVE:                                                    Nathalie Mendosa is a 59 year old female who presents to clinic today for the following health issues:      Depression and Anxiety Follow-Up    Status since last visit: No change    Other associated symptoms:None    Complicating factors:     Significant life event: No     Current substance abuse: None    PHQ 10/9/2017 4/10/2018 9/27/2018   PHQ-9 Total Score 1 1 0   Q9: Suicide Ideation Not at all Not at all Not at all     ANGI-7 SCORE 10/9/2017 4/10/2018 9/27/2018   Total Score - - -   Total Score 0 2 2       PHQ-9  English  PHQ-9   Any Language  ANGI-7  Suicide Assessment Five-step Evaluation and Treatment (SAFE-T)    Amount of exercise or physical activity: 4-5 days/week for an average of 30-45 minutes    Problems taking medications regularly: No    Medication side effects: none    Diet: regular (no restrictions)        PT having irritation from estradiol patch-plans to try off now and see how hot flashes go-could consider oral pills if needed    Problem list and histories reviewed & adjusted, as indicated.  Additional history: as documented    Patient Active Problem List   Diagnosis     OA (osteoarthritis)     Symptomatic menopausal or female climacteric states     Essential hypertension, benign     Status post hip replacement     Anxiety state     Health Care Home     Pain in joint, pelvic region and thigh     Major depressive disorder     ACP (advance care planning)     Abnormal glucose     ANGI (generalized anxiety disorder)     Insomnia, unspecified type     Past Surgical History:   Procedure Laterality Date     C EYE EXAM ESTABLISHED PT  2009     C MAMMOGRAM, SCREENING  2008     C REVISE TOTAL HIP REPLACEMENT  7/2012    Dr Jeanette ALEMAN TOTAL ABDOM HYSTERECTOMY      Hysterectomy, Total Abdominal  BSO     C TOTAL HIP ARTHROPLASTY  9/9/2011    Hip Replacement, Total  L     HC LAPAROSCOPIC MYOMECTOMY, 1 - 4 INTRAMURAL MYOMAS =<250 GM       HCL PAP SMEAR   2008    GYN     SEXA BONE DENS PERIPHERAL  2007     TEST NOT FOUND      In vitro with # proceedures       Social History     Tobacco Use     Smoking status: Never Smoker     Smokeless tobacco: Never Used   Substance Use Topics     Alcohol use: Yes     Alcohol/week: 1.0 oz     Types: 2 drink(s) per week     Comment: occasional     Family History   Problem Relation Age of Onset     C.A.D. Mother      Diabetes No family hx of      Breast Cancer Maternal Aunt      Cancer - colorectal Paternal Grandmother      Psychotic Disorder Father         anxiety/depression         Current Outpatient Medications   Medication Sig Dispense Refill     amoxicillin (AMOXIL) 500 MG capsule 4 tabs one hour before dentist appointment 4 capsule 1     estradiol (VIVELLE-DOT) 0.05 MG/24HR BIW patch APPLY 1 PATCH ON THE SKIN TWICE A WEEK 24 patch 1     LORazepam (ATIVAN) 0.5 MG tablet Take 1 tablet (0.5 mg) by mouth every 8 hours as needed for anxiety 30 tablet 0     Multiple Vitamin (MULTI-VITAMIN DAILY PO) Take  by mouth.       sertraline (ZOLOFT) 50 MG tablet Take 1 tablet (50 mg) by mouth daily 90 tablet 1     traZODone (DESYREL) 50 MG tablet Take 1 tablet (50 mg) by mouth nightly as needed for sleep 90 tablet 1     Allergies   Allergen Reactions     Morphine Swelling     Sulfa Drugs      Septra rash and hives noted on UC visit of 3-12-00.     Recent Labs   Lab Test 01/02/18  0845 02/23/16  0943 07/19/14  0956  09/12/11  0700 09/11/11  0635   * 123 140*   < >  --   --    HDL 46* 67 89   < >  --   --    TRIG 146 103 96   < >  --   --    ALT 97* 11 12   < >  --   --    CR 0.80 0.82 0.82   < > 0.73 0.85   GFRESTIMATED 81 79 81   < > 84 70   GFRESTBLACK  --   --   --   --  >90 85   POTASSIUM 4.5 3.9 4.0   < > 4.1 4.4    < > = values in this interval not displayed.      BP Readings from Last 3 Encounters:   02/26/19 116/84   09/27/18 (!) 143/92   05/24/18 130/88    Wt Readings from Last 3 Encounters:   02/26/19 57.6 kg (127 lb)  "  09/27/18 58.7 kg (129 lb 6.4 oz)   05/24/18 57.6 kg (127 lb)                    ROS:  Constitutional, HEENT, cardiovascular, pulmonary, gi and gu systems are negative, except as otherwise noted.    OBJECTIVE:     /84 (BP Location: Right arm, Patient Position: Chair, Cuff Size: Adult Regular)   Pulse 72   Temp 98.2  F (36.8  C) (Oral)   Resp 20   Ht 1.588 m (5' 2.5\")   Wt 57.6 kg (127 lb)   BMI 22.86 kg/m    Body mass index is 22.86 kg/m .   GENERAL: healthy, alert and no distress  NECK: no adenopathy, no asymmetry, masses, or scars and thyroid normal to palpation  RESP: lungs clear to auscultation - no rales, rhonchi or wheezes  CV: regular rate and rhythm, normal S1 S2, no S3 or S4, no murmur, click or rub, no peripheral edema and peripheral pulses strong  ABDOMEN: soft, nontender, no hepatosplenomegaly, no masses and bowel sounds normal  MS: no gross musculoskeletal defects noted, no edema  PSYCH: mentation appears normal, affect normal/bright        ASSESSMENT:       PLAN:   (I10) Essential hypertension, benign  (primary encounter diagnosis)  Comment: well controlled-was related to anxiety  Plan: Lipid Profile ([facility]), COMPREHENSIVE METABOLIC         PANEL ([facility]) XCMP, VENOUS COLLECTION            (G47.00) Insomnia, unspecified type  Comment: stable  Plan: traZODone (DESYREL) 50 MG tablet        continue current medications at current doses     (F33.1) Moderate episode of recurrent major depressive disorder (H)  Comment: stable  Plan: sertraline (ZOLOFT) 50 MG tablet        continue current medications at current doses     (F41.1) ANGI (generalized anxiety disorder)  Comment: stable  Plan: sertraline (ZOLOFT) 50 MG tablet        continue current medications at current doses     (R94.5) Elevated LFTs  Comment: recheck today-not using any alcohol  Plan: COMPREHENSIVE METABOLIC PANEL ([facility]) XCMP,         VENOUS COLLECTION                  FUTURE APPOINTMENTS:       - Follow-up visit in 6 mo  Regular " exercise    Sandoval Lamb MD  [facility]

## 2019-02-27 DIAGNOSIS — G47.00 INSOMNIA, UNSPECIFIED TYPE: ICD-10-CM

## 2019-02-27 LAB
ALBUMIN SERPL-MCNC: 4.6 G/DL (ref 3.6–5.1)
ALBUMIN/GLOB SERPL: 1.7 (CALC) (ref 1–2.5)
ALP SERPL-CCNC: 66 U/L (ref 33–130)
ALT SERPL-CCNC: 11 U/L (ref 6–29)
AST SERPL-CCNC: 18 U/L (ref 10–35)
BILIRUB SERPL-MCNC: 0.8 MG/DL (ref 0.2–1.2)
BUN SERPL-MCNC: 15 MG/DL (ref 7–25)
BUN/CREATININE RATIO: ABNORMAL (CALC) (ref 6–22)
CALCIUM SERPL-MCNC: 9.7 MG/DL (ref 8.6–10.4)
CHLORIDE SERPLBLD-SCNC: 100 MMOL/L (ref 98–110)
CHOLEST SERPL-MCNC: 238 MG/DL
CHOLEST/HDLC SERPL: 3.9 (CALC)
CO2 SERPL-SCNC: 28 MMOL/L (ref 20–32)
CREAT SERPL-MCNC: 0.88 MG/DL (ref 0.5–0.99)
EGFR AFRICAN AMERICAN - QUEST: 83 ML/MIN/1.73M2
GFR SERPL CREATININE-BSD FRML MDRD: 71 ML/MIN/1.73M2
GLOBULIN, CALCULATED - QUEST: 2.7 G/DL (CALC) (ref 1.9–3.7)
GLUCOSE - QUEST: 103 MG/DL (ref 65–99)
HDLC SERPL-MCNC: 61 MG/DL
LDLC SERPL CALC-MCNC: 154 MG/DL (CALC)
NONHDLC SERPL-MCNC: 177 MG/DL (CALC)
POTASSIUM SERPL-SCNC: 4.6 MMOL/L (ref 3.5–5.3)
PROT SERPL-MCNC: 7.3 G/DL (ref 6.1–8.1)
SODIUM SERPL-SCNC: 139 MMOL/L (ref 135–146)
TRIGL SERPL-MCNC: 114 MG/DL

## 2019-02-27 RX ORDER — TRAZODONE HYDROCHLORIDE 50 MG/1
TABLET, FILM COATED ORAL
Qty: 90 TABLET | Refills: 0 | OUTPATIENT
Start: 2019-02-27

## 2019-02-27 ASSESSMENT — ANXIETY QUESTIONNAIRES: GAD7 TOTAL SCORE: 2

## 2019-02-27 NOTE — TELEPHONE ENCOUNTER
Refused Prescriptions:                       Disp   Refills    traZODone (DESYREL) 50 MG tablet [Pharmacy*90 tab*0        Sig: TAKE 1 TABLET(50 MG) BY MOUTH EVERY NIGHT AS NEEDED           FOR SLEEP  Refused By: TORIE SUMNER  Reason for Refusal: Request already responded to by other means (phone, fax, etc.)  Reason for Refusal Comment: refilled 2- for six months    Torie  [phone] (home) [phone] (work)

## 2019-04-07 DIAGNOSIS — N95.1 SYMPTOMATIC MENOPAUSAL OR FEMALE CLIMACTERIC STATES: ICD-10-CM

## 2019-04-08 RX ORDER — ESTRADIOL 0.05 MG/D
PATCH, EXTENDED RELEASE TRANSDERMAL
Qty: 24 PATCH | Refills: 1 | Status: SHIPPED | OUTPATIENT
Start: 2019-04-08 | End: 2019-09-22

## 2019-04-08 NOTE — TELEPHONE ENCOUNTER
Pending Prescriptions:                       Disp   Refills    estradiol (VIVELLE-DOT) 0.05 MG/24HR bi-w*       1            Sig: APPLY 1 PATCH TO SKIN TWICE A WEEK    [facility] please review:    Last refill was 10-   Last med check was 2-   This is mail order pharmacy   Fax deny or advise or close encounter  Torie  [phone] (home) [phone] (work)

## 2019-07-23 ENCOUNTER — MYC REFILL (OUTPATIENT)
Dept: FAMILY MEDICINE | Facility: CLINIC | Age: 59
End: 2019-07-23

## 2019-07-23 DIAGNOSIS — F41.1 GAD (GENERALIZED ANXIETY DISORDER): ICD-10-CM

## 2019-07-23 NOTE — TELEPHONE ENCOUNTER
Patient requesting refill on Lorazepam  Last seen 4/8/2019 last filled 12/18/2018    Pending Prescriptions:                       Disp   Refills    LORazepam (ATIVAN) 0.5 MG tablet          30 tab*0            Sig: Take 1 tablet (0.5 mg) by mouth every 8 hours as           needed for anxiety

## 2019-07-24 ENCOUNTER — MYC REFILL (OUTPATIENT)
Dept: FAMILY MEDICINE | Facility: CLINIC | Age: 59
End: 2019-07-24

## 2019-07-24 DIAGNOSIS — G47.00 INSOMNIA, UNSPECIFIED TYPE: ICD-10-CM

## 2019-07-24 RX ORDER — LORAZEPAM 0.5 MG/1
0.5 TABLET ORAL EVERY 8 HOURS PRN
Qty: 30 TABLET | Refills: 0 | Status: SHIPPED | OUTPATIENT
Start: 2019-07-24 | End: 2019-10-24

## 2019-07-24 RX ORDER — TRAZODONE HYDROCHLORIDE 50 MG/1
50 TABLET, FILM COATED ORAL
Qty: 30 TABLET | Refills: 0 | Status: SHIPPED | OUTPATIENT
Start: 2019-07-24 | End: 2019-08-29

## 2019-07-24 NOTE — TELEPHONE ENCOUNTER
Pt due for OV. Do you want to send in 30 day?    Nathalie Mendosa is requesting a refill of:    Pending Prescriptions:                       Disp   Refills    traZODone (DESYREL) 50 MG tablet          30 tab*0            Sig: Take 1 tablet (50 mg) by mouth nightly as needed           for sleep

## 2019-08-19 ENCOUNTER — OFFICE VISIT (OUTPATIENT)
Dept: FAMILY MEDICINE | Facility: CLINIC | Age: 59
End: 2019-08-19

## 2019-08-19 VITALS
DIASTOLIC BLOOD PRESSURE: 88 MMHG | SYSTOLIC BLOOD PRESSURE: 128 MMHG | HEIGHT: 63 IN | OXYGEN SATURATION: 98 % | HEART RATE: 74 BPM | TEMPERATURE: 98 F | WEIGHT: 126.2 LBS | BODY MASS INDEX: 22.36 KG/M2

## 2019-08-19 DIAGNOSIS — J01.90 ACUTE SINUSITIS WITH SYMPTOMS > 10 DAYS: Primary | ICD-10-CM

## 2019-08-19 PROCEDURE — 99213 OFFICE O/P EST LOW 20 MIN: CPT | Performed by: FAMILY MEDICINE

## 2019-08-19 ASSESSMENT — MIFFLIN-ST. JEOR: SCORE: 1103.63

## 2019-08-19 NOTE — NURSING NOTE
Nathalie is here today for a cough.    Pre-visit Screening:  Immunizations:  up to date  Colonoscopy:  is up to date  Mammogram: is up to date  Asthma Action Test/Plan:  JODIE  PHQ9:  NA  GAD7:  NA  Questioned patient about current smoking habits Pt. has never smoked.  Ok to leave detailed message on voice mail for today's visit only Yes, phone # [phone]

## 2019-08-19 NOTE — PROGRESS NOTES
SUBJECTIVE:   Nathalie Mendosa is a 59 year old female who complains of nasal congestion, yellow nasal discharge, headache, facial pressure, bilateral sinus pain, mild sore throat, cough, chest congestion, myalgias and fatigue for 14+ days. She denies a history of sweats, shortness of breath and chest pain and denies a history of asthma. Pt. states  initially had a normal cold that started to improve but then worsened again in the last few days.Patient does not smoke cigarettes.    Pt has tried dayquil and nyquil without much relief    Patient Active Problem List   Diagnosis     OA (osteoarthritis)     Symptomatic menopausal or female climacteric states     Essential hypertension, benign     Status post hip replacement     Anxiety state     Health Care Home     Pain in joint, pelvic region and thigh     Major depressive disorder     ACP (advance care planning)     Abnormal glucose     ANGI (generalized anxiety disorder)     Insomnia, unspecified type     Past Medical History:   Diagnosis Date     Endometriosis      FAMILY HX ENDO/METAB DIS NEC 10/20/2005    Mother with high chol     Family History   Problem Relation Age of Onset     C.A.D. Mother      Diabetes No family hx of      Breast Cancer Maternal Aunt      Cancer - colorectal Paternal Grandmother      Psychotic Disorder Father         anxiety/depression     Social History     Socioeconomic History     Marital status:      Spouse name: Sara     Number of children: 1     Years of education: 14     Highest education level: Not on file   Occupational History     Occupation: Retired     Employer: [employer]   Social Needs     Financial resource strain: Not on file     Food insecurity:     Worry: Not on file     Inability: Not on file     Transportation needs:     Medical: Not on file     Non-medical: Not on file   Tobacco Use     Smoking status: Never Smoker     Smokeless tobacco: Never Used   Substance and Sexual Activity     Alcohol use: Yes      Alcohol/week: 1.0 oz     Types: 2 drink(s) per week     Comment: occasional     Drug use: No     Sexual activity: Yes     Partners: Male     Birth control/protection: Surgical     Comment: hysterectomy   Lifestyle     Physical activity:     Days per week: Not on file     Minutes per session: Not on file     Stress: Not on file   Relationships     Social connections:     Talks on phone: Not on file     Gets together: Not on file     Attends Caodaism service: Not on file     Active member of club or organization: Not on file     Attends meetings of clubs or organizations: Not on file     Relationship status: Not on file     Intimate partner violence:     Fear of current or ex partner: Not on file     Emotionally abused: Not on file     Physically abused: Not on file     Forced sexual activity: Not on file   Other Topics Concern      Service Not Asked     Blood Transfusions Not Asked     Caffeine Concern Not Asked     Occupational Exposure Not Asked     Hobby Hazards Not Asked     Sleep Concern Not Asked     Stress Concern Not Asked     Weight Concern Not Asked     Special Diet Not Asked     Back Care Not Asked     Exercise Yes     Bike Helmet Not Asked     Seat Belt Yes     Self-Exams Yes   Social History Narrative    ABUSE HISTORY:        History of abusive relationships in past:    No    History of abusive relationships currently:    No    Feels safe in home and work envirmt.                   Yes     Past Surgical History:   Procedure Laterality Date     C EYE EXAM ESTABLISHED PT  2009     C MAMMOGRAM, SCREENING  2008     C REVISE TOTAL HIP REPLACEMENT  7/2012    Dr Jeanette ALEMAN TOTAL ABDOM HYSTERECTOMY      Hysterectomy, Total Abdominal  BSO     C TOTAL HIP ARTHROPLASTY  9/9/2011    Hip Replacement, Total  L     HC LAPAROSCOPIC MYOMECTOMY, 1 - 4 INTRAMURAL MYOMAS =<250 GM       HCL PAP SMEAR  2008    GYN     SEXA BONE DENS PERIPHERAL  2007     TEST NOT FOUND      In vitro with # proceedures       Current  "Outpatient Medications on File Prior to Visit:  estradiol (VIVELLE-DOT) 0.05 MG/24HR bi-weekly patch APPLY 1 PATCH TO SKIN TWICE A WEEK   LORazepam (ATIVAN) 0.5 MG tablet Take 1 tablet (0.5 mg) by mouth every 8 hours as needed for anxiety   Multiple Vitamin (MULTI-VITAMIN DAILY PO) Take  by mouth.   sertraline (ZOLOFT) 50 MG tablet Take 1 tablet (50 mg) by mouth daily   traZODone (DESYREL) 50 MG tablet Take 1 tablet (50 mg) by mouth nightly as needed for sleep   amoxicillin (AMOXIL) 500 MG capsule 4 tabs one hour before dentist appointment     No current facility-administered medications on file prior to visit.      Allergies: Morphine and Sulfa drugs    Immunization History   Administered Date(s) Administered     Influenza (IIV3) PF 11/02/2006, 10/20/2010     Influenza Vaccine IM 3yrs+ 4 Valent IIV4 10/09/2013, 10/30/2014, 10/04/2016, 10/09/2017, 09/27/2018     Pneumococcal 23 valent 10/20/2005     TD (ADULT, 7+) 01/01/1998, 09/01/2003     TDAP Vaccine (Boostrix) 08/25/2011             OBJECTIVE:/88 (BP Location: Right arm, Patient Position: Sitting, Cuff Size: Adult Regular)   Pulse 74   Temp 98  F (36.7  C) (Oral)   Ht 1.588 m (5' 2.5\")   Wt 57.2 kg (126 lb 3.2 oz)   SpO2 98%   BMI 22.71 kg/m     She appears well, vital signs are as noted by the nurse. Ears normal.  Throat and pharynx normal.  Neck supple. No adenopathy in the neck. Nose is congested. Sinuses  Tender-maxillary. The chest is clear, without wheezes or rales.    ASSESSMENT:   Sinusitis    PLAN:augmentin, I reviewed the risks, benefits, and possible side effects of the medication.  The patient had an opportunity to ask any questions regarding the treatment plan. The patient was encouraged to call my office if any problems.  Symptomatic therapy suggested: push fluids, rest and use acetaminophen, ibuprofen, decongestant of choice as needed. Call or return to clinic prn if these symptoms worsen or fail to improve as anticipated.   "

## 2019-08-29 ENCOUNTER — MYC REFILL (OUTPATIENT)
Dept: FAMILY MEDICINE | Facility: CLINIC | Age: 59
End: 2019-08-29

## 2019-08-29 DIAGNOSIS — G47.00 INSOMNIA, UNSPECIFIED TYPE: ICD-10-CM

## 2019-08-29 RX ORDER — TRAZODONE HYDROCHLORIDE 50 MG/1
50 TABLET, FILM COATED ORAL
Qty: 30 TABLET | Refills: 0 | Status: CANCELLED | OUTPATIENT
Start: 2019-08-29

## 2019-08-29 RX ORDER — TRAZODONE HYDROCHLORIDE 50 MG/1
TABLET, FILM COATED ORAL
Qty: 30 TABLET | Refills: 0 | Status: SHIPPED | OUTPATIENT
Start: 2019-08-29 | End: 2019-09-26

## 2019-08-29 NOTE — TELEPHONE ENCOUNTER
Received incoming refill requests for  Pending Prescriptions:                       Disp   Refills    traZODone (DESYREL) 50 MG tablet [Pharmac*30 tab*0            Sig: TAKE 1 TABLET(50 MG) BY MOUTH EVERY NIGHT AS           NEEDED FOR SLEEP    Patient last had a refill of this medication on 7/24/19 for a 30 day supply because she was due for an ov. She was seen on 8/19/19 but for cold symptoms. Routing to Dr Lamb for approval or denial of medication request.

## 2019-09-22 DIAGNOSIS — N95.1 SYMPTOMATIC MENOPAUSAL OR FEMALE CLIMACTERIC STATES: ICD-10-CM

## 2019-09-23 RX ORDER — ESTRADIOL 0.05 MG/D
PATCH, EXTENDED RELEASE TRANSDERMAL
Qty: 24 PATCH | Refills: 4 | Status: SHIPPED | OUTPATIENT
Start: 2019-09-23 | End: 2020-11-16

## 2019-09-23 NOTE — TELEPHONE ENCOUNTER
Pending Prescriptions:                       Disp   Refills    estradiol (VIVELLE-DOT) 0.05 MG/24HR bi-w*       4            Sig: APPLY 1 PATCH TO SKIN TWICE A WEEK    [facility] please review::      Mammo up to date    This is mail order    Last med check was 2- rtc in six months  Last ov for another issue was 8-    Fax or deny or send to DARLYN Vogt  [phone] (home) [phone] (work)

## 2019-09-26 ENCOUNTER — OFFICE VISIT (OUTPATIENT)
Dept: FAMILY MEDICINE | Facility: CLINIC | Age: 59
End: 2019-09-26

## 2019-09-26 VITALS
DIASTOLIC BLOOD PRESSURE: 90 MMHG | BODY MASS INDEX: 22.86 KG/M2 | OXYGEN SATURATION: 97 % | HEART RATE: 77 BPM | TEMPERATURE: 97.8 F | WEIGHT: 127 LBS | SYSTOLIC BLOOD PRESSURE: 134 MMHG

## 2019-09-26 DIAGNOSIS — R51.9 SINUS HEADACHE: ICD-10-CM

## 2019-09-26 DIAGNOSIS — Z23 NEED FOR VACCINATION: Primary | ICD-10-CM

## 2019-09-26 DIAGNOSIS — G47.00 INSOMNIA, UNSPECIFIED TYPE: ICD-10-CM

## 2019-09-26 DIAGNOSIS — F41.1 GAD (GENERALIZED ANXIETY DISORDER): ICD-10-CM

## 2019-09-26 PROCEDURE — 99213 OFFICE O/P EST LOW 20 MIN: CPT | Mod: 25 | Performed by: FAMILY MEDICINE

## 2019-09-26 PROCEDURE — 90686 IIV4 VACC NO PRSV 0.5 ML IM: CPT | Performed by: FAMILY MEDICINE

## 2019-09-26 PROCEDURE — 90471 IMMUNIZATION ADMIN: CPT | Performed by: FAMILY MEDICINE

## 2019-09-26 RX ORDER — TRAZODONE HYDROCHLORIDE 50 MG/1
TABLET, FILM COATED ORAL
Qty: 90 TABLET | Refills: 1 | Status: SHIPPED | OUTPATIENT
Start: 2019-09-26 | End: 2020-02-21

## 2019-09-26 ASSESSMENT — ANXIETY QUESTIONNAIRES
5. BEING SO RESTLESS THAT IT IS HARD TO SIT STILL: NOT AT ALL
1. FEELING NERVOUS, ANXIOUS, OR ON EDGE: SEVERAL DAYS
IF YOU CHECKED OFF ANY PROBLEMS ON THIS QUESTIONNAIRE, HOW DIFFICULT HAVE THESE PROBLEMS MADE IT FOR YOU TO DO YOUR WORK, TAKE CARE OF THINGS AT HOME, OR GET ALONG WITH OTHER PEOPLE: SOMEWHAT DIFFICULT
GAD7 TOTAL SCORE: 2
2. NOT BEING ABLE TO STOP OR CONTROL WORRYING: NOT AT ALL
6. BECOMING EASILY ANNOYED OR IRRITABLE: NOT AT ALL
3. WORRYING TOO MUCH ABOUT DIFFERENT THINGS: SEVERAL DAYS
7. FEELING AFRAID AS IF SOMETHING AWFUL MIGHT HAPPEN: NOT AT ALL

## 2019-09-26 ASSESSMENT — PATIENT HEALTH QUESTIONNAIRE - PHQ9
SUM OF ALL RESPONSES TO PHQ QUESTIONS 1-9: 0
5. POOR APPETITE OR OVEREATING: NOT AT ALL

## 2019-09-26 NOTE — PROGRESS NOTES
Subjective     Nathalie Mendosa is a 59 year old female who presents to clinic today for the following health issues:    HPI   Hypertension Follow-up-not on meds to date      Do you check your blood pressure regularly outside of the clinic? No     Are you following a low salt diet? No    Are your blood pressures ever more than 140 on the top number (systolic) OR more   than 90 on the bottom number (diastolic), for example 140/90? No  Anxiety Ebyozy-Ng-xbyyd fine off ssri, uses rare ativan, trazodone    How are you doing with your anxiety since your last visit? No change    Are you having other symptoms that might be associated with anxiety? Yes:  elevated BP    Have you had a significant life event? No     Are you feeling depressed? No    Do you have any concerns with your use of alcohol or other drugs? No    Social History     Tobacco Use     Smoking status: Never Smoker     Smokeless tobacco: Never Used   Substance Use Topics     Alcohol use: Yes     Alcohol/week: 1.7 standard drinks     Types: 2 drink(s) per week     Comment: occasional     Drug use: No     ANGI-7 SCORE 4/10/2018 9/27/2018 2/26/2019   Total Score - - -   Total Score 2 2 2     PHQ 4/10/2018 9/27/2018 2/26/2019   PHQ-9 Total Score 1 0 0   Q9: Thoughts of better off dead/self-harm past 2 weeks Not at all Not at all Not at all     No flowsheet data found.  No flowsheet data found.    Pt continued to note sinus headaches since sinus infection-wants to see allergist-left maxillary and frontal area      How many servings of fruits and vegetables do you eat daily?  2-3    On average, how many sweetened beverages do you drink each day (soda, juice, sweet tea, etc)?   0    How many days per week do you miss taking your medication? 0            Patient Active Problem List   Diagnosis     OA (osteoarthritis)     Symptomatic menopausal or female climacteric states     Essential hypertension, benign     Status post hip replacement     Anxiety state     Health  Care Home     Pain in joint, pelvic region and thigh     Major depressive disorder     ACP (advance care planning)     Abnormal glucose     ANGI (generalized anxiety disorder)     Insomnia, unspecified type     Past Surgical History:   Procedure Laterality Date     C EYE EXAM ESTABLISHED PT  2009     C MAMMOGRAM, SCREENING  2008     C REVISE TOTAL HIP REPLACEMENT  7/2012    Dr Jeanette ALEMAN TOTAL ABDOM HYSTERECTOMY      Hysterectomy, Total Abdominal  BSO     C TOTAL HIP ARTHROPLASTY  9/9/2011    Hip Replacement, Total  L     HC LAPAROSCOPIC MYOMECTOMY, 1 - 4 INTRAMURAL MYOMAS =<250 GM       HCL PAP SMEAR  2008    GYN     SEXA BONE DENS PERIPHERAL  2007     TEST NOT FOUND      In vitro with # proceedures       Social History     Tobacco Use     Smoking status: Never Smoker     Smokeless tobacco: Never Used   Substance Use Topics     Alcohol use: Yes     Alcohol/week: 1.7 standard drinks     Types: 2 drink(s) per week     Comment: occasional     Family History   Problem Relation Age of Onset     C.A.D. Mother      Diabetes No family hx of      Breast Cancer Maternal Aunt      Cancer - colorectal Paternal Grandmother      Psychotic Disorder Father         anxiety/depression         Current Outpatient Medications   Medication Sig Dispense Refill     estradiol (VIVELLE-DOT) 0.05 MG/24HR bi-weekly patch APPLY 1 PATCH TO SKIN TWICE A WEEK 24 patch 4     LORazepam (ATIVAN) 0.5 MG tablet Take 1 tablet (0.5 mg) by mouth every 8 hours as needed for anxiety 30 tablet 0     Multiple Vitamin (MULTI-VITAMIN DAILY PO) Take  by mouth.       traZODone (DESYREL) 50 MG tablet TAKE 1 TABLET(50 MG) BY MOUTH EVERY NIGHT AS NEEDED FOR SLEEP 90 tablet 1     amoxicillin (AMOXIL) 500 MG capsule 4 tabs one hour before dentist appointment 4 capsule 1     Allergies   Allergen Reactions     Morphine Swelling     Sulfa Drugs      Septra rash and hives noted on  visit of 3-12-00.     Recent Labs   Lab Test 02/26/19  1029 01/02/18  0845  02/23/16  0943  09/12/11  0700 09/11/11  0635   * 110* 123   < >  --   --    HDL 61 46* 67   < >  --   --    TRIG 114 146 103   < >  --   --    ALT 11 97* 11   < >  --   --    CR 0.88 0.80 0.82   < > 0.73 0.85   GFRESTIMATED 71 81 79   < > 84 70   GFRESTBLACK  --   --   --   --  >90 85   POTASSIUM 4.6 4.5 3.9   < > 4.1 4.4    < > = values in this interval not displayed.      BP Readings from Last 3 Encounters:   09/26/19 (!) 134/90   08/19/19 128/88   02/26/19 116/84    Wt Readings from Last 3 Encounters:   09/26/19 57.6 kg (127 lb)   08/19/19 57.2 kg (126 lb 3.2 oz)   02/26/19 57.6 kg (127 lb)                      Reviewed and updated as needed this visit by Provider         Review of Systems   ROS COMP: Constitutional, HEENT, cardiovascular, pulmonary, gi and gu systems are negative, except as otherwise noted.      Objective    BP (!) 134/90 (BP Location: Right arm, Patient Position: Sitting, Cuff Size: Adult Regular)   Pulse 77   Temp 97.8  F (36.6  C) (Oral)   Wt 57.6 kg (127 lb)   SpO2 97%   BMI 22.86 kg/m    Body mass index is 22.86 kg/m .  Physical Exam   GENERAL: healthy, alert and no distress  EYES: Eyes grossly normal to inspection, PERRL and conjunctivae and sclerae normal  HENT: ear canals and TM's normal, nose and mouth without ulcers or lesions  NECK: no adenopathy, no asymmetry, masses, or scars and thyroid normal to palpation  RESP: lungs clear to auscultation - no rales, rhonchi or wheezes  CV: regular rate and rhythm, normal S1 S2, no S3 or S4, no murmur, click or rub, no peripheral edema and peripheral pulses strong  ABDOMEN: soft, nontender, no hepatosplenomegaly, no masses and bowel sounds normal  MS: no gross musculoskeletal defects noted, no edema    Diagnostic Test Results:  Labs reviewed in Epic        Assessment & Plan   Assessment      Plan  (Z23) Need for vaccination  (primary encounter diagnosis)  Comment:   Plan: HC FLU VAC PRESRV FREE QUAD SPLIT VIR > 6         MONTHS IM             (R51) Sinus headache  Comment: pt c/o of persistent headache follwing sinus infection-asks to see allergy but I recommend ENT as more appropriate  Plan: OTOLARYNGOLOGY REFERRAL            (G47.00) Insomnia, unspecified type  Comment: stable  Plan: traZODone (DESYREL) 50 MG tablet        continue current medications at current doses     (F41.1) ANGI (generalized anxiety disorder)  Comment: stable on current plan  Plan: continue current medications at current doses       FUTURE APPOINTMENTS:       - Follow-up visit in 6 mo  Regular exercise    No follow-ups on file.    Sandoval Lamb MD  [facility]

## 2019-09-27 ENCOUNTER — HEALTH MAINTENANCE LETTER (OUTPATIENT)
Age: 59
End: 2019-09-27

## 2019-09-27 ASSESSMENT — ANXIETY QUESTIONNAIRES: GAD7 TOTAL SCORE: 2

## 2019-10-24 ENCOUNTER — MYC REFILL (OUTPATIENT)
Dept: FAMILY MEDICINE | Facility: CLINIC | Age: 59
End: 2019-10-24

## 2019-10-24 DIAGNOSIS — F41.1 GAD (GENERALIZED ANXIETY DISORDER): ICD-10-CM

## 2019-10-25 RX ORDER — LORAZEPAM 0.5 MG/1
0.5 TABLET ORAL EVERY 8 HOURS PRN
Qty: 30 TABLET | Refills: 0 | Status: SHIPPED | OUTPATIENT
Start: 2019-10-25 | End: 2020-03-05

## 2019-10-25 NOTE — TELEPHONE ENCOUNTER
Nathalie Mendosa is requesting a refill of:    Pending Prescriptions:                       Disp   Refills    LORazepam (ATIVAN) 0.5 MG tablet          30 tab*0            Sig: Take 1 tablet (0.5 mg) by mouth every 8 hours as           needed for anxiety

## 2019-12-12 ENCOUNTER — MYC MEDICAL ADVICE (OUTPATIENT)
Dept: FAMILY MEDICINE | Facility: CLINIC | Age: 59
End: 2019-12-12

## 2020-01-29 ENCOUNTER — MYC REFILL (OUTPATIENT)
Dept: FAMILY MEDICINE | Facility: CLINIC | Age: 60
End: 2020-01-29

## 2020-01-29 DIAGNOSIS — G47.00 INSOMNIA, UNSPECIFIED TYPE: ICD-10-CM

## 2020-01-29 RX ORDER — TRAZODONE HYDROCHLORIDE 50 MG/1
TABLET, FILM COATED ORAL
Qty: 90 TABLET | Refills: 1 | Status: CANCELLED | OUTPATIENT
Start: 2020-01-29

## 2020-02-05 DIAGNOSIS — F41.1 GAD (GENERALIZED ANXIETY DISORDER): ICD-10-CM

## 2020-02-05 DIAGNOSIS — F33.1 MODERATE EPISODE OF RECURRENT MAJOR DEPRESSIVE DISORDER (H): ICD-10-CM

## 2020-02-05 NOTE — TELEPHONE ENCOUNTER
Nathalie Mendosa is requesting a refill of:    Refused Prescriptions:                       Disp   Refills    sertraline (ZOLOFT) 50 MG tablet [Pharmacy*90 tab*1        Sig: TAKE 1 TABLET(50 MG) BY MOUTH DAILY  Refused By: BRENDA WERNER  Reason for Refusal: Adjustment in Therapy  Reason for Refusal Comment: Pt no longer taking this medication    Discontinued on 09/26/19.

## 2020-02-21 ENCOUNTER — MYC REFILL (OUTPATIENT)
Dept: FAMILY MEDICINE | Facility: CLINIC | Age: 60
End: 2020-02-21

## 2020-02-21 DIAGNOSIS — G47.00 INSOMNIA, UNSPECIFIED TYPE: ICD-10-CM

## 2020-02-21 RX ORDER — TRAZODONE HYDROCHLORIDE 50 MG/1
TABLET, FILM COATED ORAL
Qty: 90 TABLET | Refills: 1 | COMMUNITY
Start: 2020-02-21

## 2020-02-21 RX ORDER — TRAZODONE HYDROCHLORIDE 50 MG/1
TABLET, FILM COATED ORAL
Qty: 90 TABLET | Refills: 1 | Status: SHIPPED | OUTPATIENT
Start: 2020-02-21 | End: 2020-03-05

## 2020-02-21 NOTE — TELEPHONE ENCOUNTER
Received incoming refill request for   Pending Prescriptions:                       Disp   Refills    traZODone 50 MG PO tablet                 90 tab*1            Sig: TAKE 1 TABLET(50 MG) BY MOUTH EVERY NIGHT AS           NEEDED FOR SLEEP    Patient last had a refill of this medication on 9/26/19 for a 6 month refill. She should be ok until next month but in her [employer] message she stated that she had to increase the dose. She is coming in for an appointment on 2/28/20, routing to Dr. Lamb for approval or denial of refill request.

## 2020-03-04 ENCOUNTER — TRANSFERRED RECORDS (OUTPATIENT)
Dept: FAMILY MEDICINE | Facility: CLINIC | Age: 60
End: 2020-03-04

## 2020-03-05 ENCOUNTER — OFFICE VISIT (OUTPATIENT)
Dept: FAMILY MEDICINE | Facility: CLINIC | Age: 60
End: 2020-03-05

## 2020-03-05 VITALS
WEIGHT: 123 LBS | HEART RATE: 68 BPM | HEIGHT: 63 IN | DIASTOLIC BLOOD PRESSURE: 88 MMHG | BODY MASS INDEX: 21.79 KG/M2 | RESPIRATION RATE: 20 BRPM | TEMPERATURE: 97.7 F | SYSTOLIC BLOOD PRESSURE: 138 MMHG

## 2020-03-05 DIAGNOSIS — G47.00 INSOMNIA, UNSPECIFIED TYPE: ICD-10-CM

## 2020-03-05 DIAGNOSIS — F41.1 GAD (GENERALIZED ANXIETY DISORDER): ICD-10-CM

## 2020-03-05 DIAGNOSIS — Z96.642 STATUS POST LEFT HIP REPLACEMENT: ICD-10-CM

## 2020-03-05 DIAGNOSIS — Z12.11 SCREENING FOR COLON CANCER: Primary | ICD-10-CM

## 2020-03-05 PROCEDURE — 99213 OFFICE O/P EST LOW 20 MIN: CPT | Performed by: FAMILY MEDICINE

## 2020-03-05 RX ORDER — LORAZEPAM 0.5 MG/1
0.5 TABLET ORAL EVERY 8 HOURS PRN
Qty: 30 TABLET | Refills: 0 | Status: SHIPPED | OUTPATIENT
Start: 2020-03-05 | End: 2020-09-15

## 2020-03-05 RX ORDER — TRAZODONE HYDROCHLORIDE 100 MG/1
100 TABLET ORAL AT BEDTIME
Qty: 90 TABLET | Refills: 1 | Status: SHIPPED | OUTPATIENT
Start: 2020-03-05 | End: 2020-09-15

## 2020-03-05 RX ORDER — AMOXICILLIN 500 MG/1
CAPSULE ORAL
Qty: 4 CAPSULE | Refills: 1 | Status: SHIPPED | OUTPATIENT
Start: 2020-03-05 | End: 2021-05-24

## 2020-03-05 ASSESSMENT — ANXIETY QUESTIONNAIRES
GAD7 TOTAL SCORE: 2
2. NOT BEING ABLE TO STOP OR CONTROL WORRYING: NOT AT ALL
5. BEING SO RESTLESS THAT IT IS HARD TO SIT STILL: NOT AT ALL
7. FEELING AFRAID AS IF SOMETHING AWFUL MIGHT HAPPEN: NOT AT ALL
3. WORRYING TOO MUCH ABOUT DIFFERENT THINGS: NOT AT ALL
1. FEELING NERVOUS, ANXIOUS, OR ON EDGE: SEVERAL DAYS
IF YOU CHECKED OFF ANY PROBLEMS ON THIS QUESTIONNAIRE, HOW DIFFICULT HAVE THESE PROBLEMS MADE IT FOR YOU TO DO YOUR WORK, TAKE CARE OF THINGS AT HOME, OR GET ALONG WITH OTHER PEOPLE: SOMEWHAT DIFFICULT
6. BECOMING EASILY ANNOYED OR IRRITABLE: NOT AT ALL

## 2020-03-05 ASSESSMENT — MIFFLIN-ST. JEOR: SCORE: 1084.11

## 2020-03-05 ASSESSMENT — PATIENT HEALTH QUESTIONNAIRE - PHQ9
SUM OF ALL RESPONSES TO PHQ QUESTIONS 1-9: 1
5. POOR APPETITE OR OVEREATING: SEVERAL DAYS

## 2020-03-05 NOTE — PROGRESS NOTES
"SUBJECTIVE:  Nathalie Mendosa, a 60 year old female scheduled an appointment to discuss the following issues:     Insomnia, unspecified type  ANGI (generalized anxiety disorder)  PT here for recheck- mainly needs trazodone refill, also requests a few more lorazepam-uses #30 in 6 months      PT has had a flare up of her hip pain again- in PT and on steroids, this has worsened her mood    Medical, social, surgical, and family histories reviewed.    ROS:  CONSTITUTIONAL: NEGATIVE for fever, chills  EYES: NEGATIVE for vision changes   RESP: NEGATIVE for significant cough or SOB  CV: NEGATIVE for chest pain, palpitations   GI: NEGATIVE for nausea, abdominal pain, heartburn, or change in bowel habits  : NEGATIVE for frequency, dysuria, or hematuria  MUSCULOSKELETAL: NEGATIVE for significant arthralgias or myalgia  NEURO: NEGATIVE for weakness, dizziness or paresthesias or headache    OBJECTIVE:  /88 (BP Location: Right arm, Patient Position: Chair, Cuff Size: Adult Small)   Pulse 68   Temp 97.7  F (36.5  C) (Oral)   Resp 20   Ht 1.588 m (5' 2.5\")   Wt 55.8 kg (123 lb)   BMI 22.14 kg/m    EXAM:  GENERAL APPEARANCE: healthy, alert and no distress  EYES: EOMI,  PERRL  HENT: ear canals and TM's normal and nose and mouth without ulcers or lesions  RESP: lungs clear to auscultation - no rales, rhonchi or wheezes  CV: regular rates and rhythm, normal S1 S2, no S3 or S4 and no murmur, click or rub -  ABDOMEN:  soft, nontender, no HSM or masses and bowel sounds normal  PSYCH: mentation appears normal and affect normal/bright    ASSESSMENT/PLAN:  (G47.00) Insomnia, unspecified type  Comment: stable symptomatically   Plan: traZODone (DESYREL) 50 MG tablet        continue current medications at current doses     (F41.1) ANGI (generalized anxiety disorder)  Comment: using rare ativan  Plan: I reviewed the risks, benefits, and possible side effects of the medication.  The patient had an opportunity to ask any questions " regarding the treatment plan. The patient was encouraged to call my office if any problems.

## 2020-03-05 NOTE — NURSING NOTE
Questioned patient about current smoking habits.  Pt. has never smoked.  PULSE regular  My Chart: active  CLASSIFICATION OF OVERWEIGHT AND OBESITY BY BMI                        Obesity Class           BMI(kg/m2)  Underweight                                    < 18.5  Normal                                         18.5-24.9  Overweight                                     25.0-29.9  OBESITY                     I                  30.0-34.9                             II                 35.0-39.9  EXTREME OBESITY             III                >40                            Patient's  BMI Body mass index is 22.14 kg/m .  http://hin.nhlbi.nih.gov/menuplanner/menu.cgi  Pre-visit planning  Immunizations - up to date  Colonoscopy - is due and ordered today  Mammogram - is up to date  Asthma -   PHQ9 -    ANGI-7 -

## 2020-03-06 ASSESSMENT — ANXIETY QUESTIONNAIRES: GAD7 TOTAL SCORE: 2

## 2020-05-18 ENCOUNTER — TRANSFERRED RECORDS (OUTPATIENT)
Dept: FAMILY MEDICINE | Facility: CLINIC | Age: 60
End: 2020-05-18

## 2020-08-31 ENCOUNTER — MYC REFILL (OUTPATIENT)
Dept: FAMILY MEDICINE | Facility: CLINIC | Age: 60
End: 2020-08-31

## 2020-08-31 DIAGNOSIS — F41.1 GAD (GENERALIZED ANXIETY DISORDER): ICD-10-CM

## 2020-08-31 RX ORDER — LORAZEPAM 0.5 MG/1
0.5 TABLET ORAL EVERY 8 HOURS PRN
Qty: 30 TABLET | Refills: 0 | Status: CANCELLED | OUTPATIENT
Start: 2020-08-31

## 2020-09-07 DIAGNOSIS — G47.00 INSOMNIA, UNSPECIFIED TYPE: ICD-10-CM

## 2020-09-08 RX ORDER — TRAZODONE HYDROCHLORIDE 100 MG/1
TABLET ORAL
Qty: 90 TABLET | Refills: 1 | COMMUNITY
Start: 2020-09-08

## 2020-09-08 NOTE — TELEPHONE ENCOUNTER
Last seen 3/5/2020, needs OV.    Refused Prescriptions:                       Disp   Refills    traZODone (DESYREL) 100 MG tablet [Pharmac*90 tab*1        Sig: TAKE 1 TABLET BY MOUTH AT BEDTIME AS NEEDED SLEEP  Refused By: HALINA HAMM  Reason for Refusal: Patient needs appointment

## 2020-09-15 ENCOUNTER — OFFICE VISIT (OUTPATIENT)
Dept: FAMILY MEDICINE | Facility: CLINIC | Age: 60
End: 2020-09-15

## 2020-09-15 VITALS
TEMPERATURE: 97 F | SYSTOLIC BLOOD PRESSURE: 122 MMHG | HEIGHT: 63 IN | BODY MASS INDEX: 21.65 KG/M2 | HEART RATE: 72 BPM | WEIGHT: 122.2 LBS | RESPIRATION RATE: 20 BRPM | DIASTOLIC BLOOD PRESSURE: 78 MMHG

## 2020-09-15 DIAGNOSIS — F41.1 GAD (GENERALIZED ANXIETY DISORDER): ICD-10-CM

## 2020-09-15 DIAGNOSIS — G47.00 INSOMNIA, UNSPECIFIED TYPE: Primary | ICD-10-CM

## 2020-09-15 DIAGNOSIS — R73.09 ABNORMAL GLUCOSE: ICD-10-CM

## 2020-09-15 LAB
ALBUMIN SERPL-MCNC: 4.4 G/DL (ref 3.6–5.1)
ALBUMIN/GLOB SERPL: 1.7 {RATIO} (ref 1–2.5)
ALP SERPL-CCNC: 59 U/L (ref 33–130)
ALT 1742-6: 3 U/L (ref 0–32)
AST 1920-8: 9 U/L (ref 0–35)
BILIRUB SERPL-MCNC: 1 MG/DL (ref 0.2–1.2)
BUN SERPL-MCNC: 18 MG/DL (ref 7–25)
BUN/CREATININE RATIO: 22.2 (ref 6–22)
CALCIUM SERPL-MCNC: 9.6 MG/DL (ref 8.6–10.3)
CHLORIDE SERPLBLD-SCNC: 101.5 MMOL/L (ref 98–110)
CHOLEST SERPL-MCNC: 231 MG/DL (ref 0–199)
CHOLEST/HDLC SERPL: 3 {RATIO} (ref 0–5)
CO2 SERPL-SCNC: 30.1 MMOL/L (ref 20–32)
CREAT SERPL-MCNC: 0.81 MG/DL (ref 0.7–1.18)
GLOBULIN, CALCULATED - QUEST: 2.6 (ref 1.9–3.7)
GLUCOSE SERPL-MCNC: 100 MG/DL (ref 60–99)
HDLC SERPL-MCNC: 70 MG/DL (ref 40–150)
LDLC SERPL CALC-MCNC: 148 MG/DL (ref 0–130)
POTASSIUM SERPL-SCNC: 4.65 MMOL/L (ref 3.5–5.3)
PROT SERPL-MCNC: 7 G/DL (ref 6.1–8.1)
SODIUM SERPL-SCNC: 139.3 MMOL/L (ref 135–146)
TRIGL SERPL-MCNC: 63 MG/DL (ref 0–149)

## 2020-09-15 PROCEDURE — 99213 OFFICE O/P EST LOW 20 MIN: CPT | Performed by: FAMILY MEDICINE

## 2020-09-15 PROCEDURE — 80061 LIPID PANEL: CPT | Performed by: FAMILY MEDICINE

## 2020-09-15 PROCEDURE — 36415 COLL VENOUS BLD VENIPUNCTURE: CPT | Performed by: FAMILY MEDICINE

## 2020-09-15 PROCEDURE — 80053 COMPREHEN METABOLIC PANEL: CPT | Performed by: FAMILY MEDICINE

## 2020-09-15 RX ORDER — TRAZODONE HYDROCHLORIDE 100 MG/1
100 TABLET ORAL AT BEDTIME
Qty: 90 TABLET | Refills: 1 | Status: SHIPPED | OUTPATIENT
Start: 2020-09-15 | End: 2021-03-15

## 2020-09-15 RX ORDER — LORAZEPAM 0.5 MG/1
0.5 TABLET ORAL EVERY 8 HOURS PRN
Qty: 30 TABLET | Refills: 0 | Status: SHIPPED | OUTPATIENT
Start: 2020-09-15 | End: 2021-03-15

## 2020-09-15 ASSESSMENT — ANXIETY QUESTIONNAIRES
6. BECOMING EASILY ANNOYED OR IRRITABLE: SEVERAL DAYS
GAD7 TOTAL SCORE: 3
1. FEELING NERVOUS, ANXIOUS, OR ON EDGE: SEVERAL DAYS
3. WORRYING TOO MUCH ABOUT DIFFERENT THINGS: NOT AT ALL
5. BEING SO RESTLESS THAT IT IS HARD TO SIT STILL: NOT AT ALL
IF YOU CHECKED OFF ANY PROBLEMS ON THIS QUESTIONNAIRE, HOW DIFFICULT HAVE THESE PROBLEMS MADE IT FOR YOU TO DO YOUR WORK, TAKE CARE OF THINGS AT HOME, OR GET ALONG WITH OTHER PEOPLE: NOT DIFFICULT AT ALL
2. NOT BEING ABLE TO STOP OR CONTROL WORRYING: SEVERAL DAYS
7. FEELING AFRAID AS IF SOMETHING AWFUL MIGHT HAPPEN: NOT AT ALL

## 2020-09-15 ASSESSMENT — MIFFLIN-ST. JEOR: SCORE: 1080.49

## 2020-09-15 ASSESSMENT — PATIENT HEALTH QUESTIONNAIRE - PHQ9
SUM OF ALL RESPONSES TO PHQ QUESTIONS 1-9: 1
5. POOR APPETITE OR OVEREATING: NOT AT ALL

## 2020-09-15 NOTE — PROGRESS NOTES
"Subjective     Nathalie Mendosa is a 60 year old female who presents to clinic today for the following health issues:    HPI       Anxiety/INSOMNIA Follow-Up    How are you doing with your anxiety since your last visit? Worsened some with covid stressors    Are you having other symptoms that might be associated with anxiety? Yes:  insomnia    Have you had a significant life event? covid     Are you feeling depressed? No    Do you have any concerns with your use of alcohol or other drugs? No    Social History     Tobacco Use     Smoking status: Never Smoker     Smokeless tobacco: Never Used   Substance Use Topics     Alcohol use: Yes     Alcohol/week: 1.7 standard drinks     Types: 2 drink(s) per week     Comment: occasional     Drug use: No     ANGI-7 SCORE 2/26/2019 9/26/2019 3/5/2020   Total Score - - -   Total Score 2 2 2     PHQ 2/26/2019 9/26/2019 3/5/2020   PHQ-9 Total Score 0 0 1   Q9: Thoughts of better off dead/self-harm past 2 weeks Not at all Not at all Not at all           How many servings of fruits and vegetables do you eat daily?  2-3    On average, how many sweetened beverages do you drink each day (Examples: soda, juice, sweet tea, etc.  Do NOT count diet or artificially sweetened beverages)?   1    How many days per week do you exercise enough to make your heart beat faster? 4    How many minutes a day do you exercise enough to make your heart beat faster? 30 - 60    How many days per week do you miss taking your medication? 0    PT wants fasting labs today    Review of Systems   Constitutional, HEENT, cardiovascular, pulmonary, gi and gu systems are negative, except as otherwise noted.      Objective    /78 (BP Location: Right arm, Patient Position: Chair, Cuff Size: Adult Regular)   Pulse 72   Temp 97  F (36.1  C)   Resp 20   Ht 1.588 m (5' 2.5\")   Wt 55.4 kg (122 lb 3.2 oz)   BMI 21.99 kg/m    Body mass index is 21.99 kg/m .  Physical Exam   GENERAL: healthy, alert and no " distress  NECK: no adenopathy, no asymmetry, masses, or scars and thyroid normal to palpation  RESP: lungs clear to auscultation - no rales, rhonchi or wheezes  CV: regular rate and rhythm, normal S1 S2, no S3 or S4, no murmur, click or rub, no peripheral edema and peripheral pulses strong  PSYCH: mentation appears normal, affect normal/bright            Assessment & Plan     Insomnia, unspecified type  stable symptomatically , continue current medications at current doses   - traZODone (DESYREL) 100 MG tablet  Dispense: 90 tablet; Refill: 1    ANGI (generalized anxiety disorder)  Somewhat worse- not using more alprazolam, , recommend she seek out therapist     Abnormal glucose  Recheck labs  - Lipid Panel (BFP)  - Comprehensive Metobolic Panel (BFP)  - VENOUS COLLECTION         FUTURE APPOINTMENTS:       - Follow-up visit in 6 mo  Regular exercise    No follow-ups on file.    Sandoval Lamb MD  [facility]

## 2020-09-15 NOTE — NURSING NOTE
Nathalie Mendosa is here for fasting blood work and medication refill.  Questioned patient about current smoking habits.  Pt. has never smoked.  Body mass index is 33.67 kg/(m^2).  PULSE regular  My Chart: active    Pre-visit planning  Immunizations - up to date  Colonoscopy - is ordered for Pt to complete  Mammogram - is up to date  Asthma -   PHQ9  ANGI-7

## 2020-09-16 ASSESSMENT — ANXIETY QUESTIONNAIRES: GAD7 TOTAL SCORE: 3

## 2020-11-09 ENCOUNTER — ALLIED HEALTH/NURSE VISIT (OUTPATIENT)
Dept: FAMILY MEDICINE | Facility: CLINIC | Age: 60
End: 2020-11-09

## 2020-11-09 DIAGNOSIS — Z23 NEED FOR VACCINATION: Primary | ICD-10-CM

## 2020-11-09 PROCEDURE — 90471 IMMUNIZATION ADMIN: CPT | Performed by: FAMILY MEDICINE

## 2020-11-09 PROCEDURE — 90686 IIV4 VACC NO PRSV 0.5 ML IM: CPT | Performed by: FAMILY MEDICINE

## 2020-11-15 DIAGNOSIS — N95.1 SYMPTOMATIC MENOPAUSAL OR FEMALE CLIMACTERIC STATES: ICD-10-CM

## 2020-11-16 RX ORDER — ESTRADIOL 0.05 MG/D
PATCH, EXTENDED RELEASE TRANSDERMAL
Qty: 24 PATCH | Refills: 0 | Status: SHIPPED | OUTPATIENT
Start: 2020-11-16 | End: 2021-03-01

## 2020-11-16 NOTE — TELEPHONE ENCOUNTER
Nathalie Mendosa is requesting a refill of:    Pending Prescriptions:                       Disp   Refills    estradiol (VIVELLE-DOT) 0.05 MG/24HR bi-w*24 pat*0            Sig: APPLY 1 PATCH TO SKIN TWICE A WEEK    Please close encounter if RX was sent. Thanks, Jory Lynch for mammogram next month

## 2021-01-09 ENCOUNTER — HEALTH MAINTENANCE LETTER (OUTPATIENT)
Age: 61
End: 2021-01-09

## 2021-02-07 DIAGNOSIS — N95.1 SYMPTOMATIC MENOPAUSAL OR FEMALE CLIMACTERIC STATES: ICD-10-CM

## 2021-02-08 RX ORDER — ESTRADIOL 0.05 MG/D
PATCH, EXTENDED RELEASE TRANSDERMAL
Qty: 8 PATCH | Refills: 0 | COMMUNITY
Start: 2021-02-08

## 2021-02-08 NOTE — TELEPHONE ENCOUNTER
Nathalie Mendosa is requesting a refill of:    Refused Prescriptions:                       Disp   Refills    estradiol (VIVELLE-DOT) 0.05 MG/24HR bi-we*8 patch0        Sig: APPLY 1 PATCH TO SKIN TWICE A WEEK  Refused By: GENNY BAIRD  Reason for Refusal: Patient needs appointment

## 2021-03-01 ENCOUNTER — MYC REFILL (OUTPATIENT)
Dept: FAMILY MEDICINE | Facility: CLINIC | Age: 61
End: 2021-03-01

## 2021-03-01 DIAGNOSIS — N95.1 SYMPTOMATIC MENOPAUSAL OR FEMALE CLIMACTERIC STATES: ICD-10-CM

## 2021-03-01 RX ORDER — ESTRADIOL 0.05 MG/D
1 PATCH, EXTENDED RELEASE TRANSDERMAL
Qty: 24 PATCH | Refills: 0 | Status: SHIPPED | OUTPATIENT
Start: 2021-03-01 | End: 2021-05-06

## 2021-03-01 NOTE — TELEPHONE ENCOUNTER
Patient is requesting a refill of  Pending Prescriptions:                       Disp   Refills    estradiol (VIVELLE-DOT) 0.05 MG/24HR bi-w*24 pat*0          She does have an appointment scheduled for 3/15/21, routing to Dr. Lamb for approval or denial of refill request.

## 2021-03-15 ENCOUNTER — OFFICE VISIT (OUTPATIENT)
Dept: FAMILY MEDICINE | Facility: CLINIC | Age: 61
End: 2021-03-15

## 2021-03-15 VITALS
OXYGEN SATURATION: 97 % | RESPIRATION RATE: 20 BRPM | TEMPERATURE: 97.9 F | BODY MASS INDEX: 21.96 KG/M2 | DIASTOLIC BLOOD PRESSURE: 86 MMHG | WEIGHT: 122 LBS | SYSTOLIC BLOOD PRESSURE: 122 MMHG | HEART RATE: 65 BPM

## 2021-03-15 DIAGNOSIS — Z96.642 STATUS POST LEFT HIP REPLACEMENT: ICD-10-CM

## 2021-03-15 DIAGNOSIS — F41.1 GAD (GENERALIZED ANXIETY DISORDER): Primary | ICD-10-CM

## 2021-03-15 DIAGNOSIS — N95.1 SYMPTOMATIC MENOPAUSAL OR FEMALE CLIMACTERIC STATES: ICD-10-CM

## 2021-03-15 DIAGNOSIS — G47.00 INSOMNIA, UNSPECIFIED TYPE: ICD-10-CM

## 2021-03-15 DIAGNOSIS — E78.2 MIXED HYPERLIPIDEMIA: ICD-10-CM

## 2021-03-15 PROCEDURE — 99214 OFFICE O/P EST MOD 30 MIN: CPT | Performed by: FAMILY MEDICINE

## 2021-03-15 RX ORDER — LORAZEPAM 0.5 MG/1
0.5 TABLET ORAL EVERY 8 HOURS PRN
Qty: 30 TABLET | Refills: 0 | Status: SHIPPED | OUTPATIENT
Start: 2021-03-15 | End: 2021-09-02

## 2021-03-15 RX ORDER — ESTRADIOL 0.05 MG/D
1 PATCH, EXTENDED RELEASE TRANSDERMAL
Qty: 24 PATCH | Refills: 0 | Status: CANCELLED | OUTPATIENT
Start: 2021-03-15

## 2021-03-15 RX ORDER — TRAZODONE HYDROCHLORIDE 100 MG/1
100 TABLET ORAL AT BEDTIME
Qty: 90 TABLET | Refills: 1 | Status: SHIPPED | OUTPATIENT
Start: 2021-03-15 | End: 2021-09-02

## 2021-03-15 ASSESSMENT — ANXIETY QUESTIONNAIRES
2. NOT BEING ABLE TO STOP OR CONTROL WORRYING: NOT AT ALL
GAD7 TOTAL SCORE: 0
5. BEING SO RESTLESS THAT IT IS HARD TO SIT STILL: NOT AT ALL
7. FEELING AFRAID AS IF SOMETHING AWFUL MIGHT HAPPEN: NOT AT ALL
3. WORRYING TOO MUCH ABOUT DIFFERENT THINGS: NOT AT ALL
1. FEELING NERVOUS, ANXIOUS, OR ON EDGE: NOT AT ALL
6. BECOMING EASILY ANNOYED OR IRRITABLE: NOT AT ALL
IF YOU CHECKED OFF ANY PROBLEMS ON THIS QUESTIONNAIRE, HOW DIFFICULT HAVE THESE PROBLEMS MADE IT FOR YOU TO DO YOUR WORK, TAKE CARE OF THINGS AT HOME, OR GET ALONG WITH OTHER PEOPLE: NOT DIFFICULT AT ALL

## 2021-03-15 ASSESSMENT — PATIENT HEALTH QUESTIONNAIRE - PHQ9
SUM OF ALL RESPONSES TO PHQ QUESTIONS 1-9: 1
5. POOR APPETITE OR OVEREATING: NOT AT ALL

## 2021-03-15 NOTE — NURSING NOTE
Chief Complaint   Patient presents with     Recheck Medication     non fasting medication check and review     Pre-visit Screening:  Immunizations:  up to date but coming up due for tetanus shot in July   Colonoscopy:  Is due but patient will get this scheduled at another time-pt does not want order put in today  Mammogram: is up to date  Asthma Action Test/Plan:  NA  PHQ9:  Given today   GAD7:  Given today   Questioned patient about current smoking habits Pt. has never smoked.  Ok to leave detailed message on voice mail for today's visit only YEs, phone # [phone]

## 2021-03-15 NOTE — PROGRESS NOTES
Assessment & Plan     ANGI (generalized anxiety disorder)  Well controlled, uses ativan once per week, continue current medications at current doses   - LORazepam (ATIVAN) 0.5 MG tablet  Dispense: 30 tablet; Refill: 0    Insomnia, unspecified type  stable symptomatically , recommend she try 75 mg dose, continue current medications at current doses   - traZODone (DESYREL) 100 MG tablet  Dispense: 90 tablet; Refill: 1    Symptomatic menopausal or female climacteric states  stable symptomatically continue current medications at current doses I reviewed the risks, benefits, and possible side effects of the medication.  The patient had an opportunity to ask any questions regarding the treatment plan. The patient was encouraged to call my office if any problems.     Mixed hyperlipidemia  Good habits, recheck 6 mo    Status post left hip replacement  Dealing ok with pain -doing exercises      Review of the result(s) of each unique test - screeners, previous labs         FUTURE APPOINTMENTS:       - Follow-up visit in 6 mo  Regular exercise    No follow-ups on file.    Sandoval Lamb MD  [facility]    Diane Zelaya is a 61 year old who presents for the following health issues     HPI       Anxiety Follow-Up    How are you doing with your anxiety since your last visit? Worsened slightly with covid restrictions    Are you having other symptoms that might be associated with anxiety? Yes:  sleep but doing ok    Have you had a significant life event? Chronic hip issues s/p replacement, covid     Are you feeling depressed? No    Do you have any concerns with your use of alcohol or other drugs? No    Social History     Tobacco Use     Smoking status: Never Smoker     Smokeless tobacco: Never Used   Substance Use Topics     Alcohol use: Yes     Alcohol/week: 1.7 standard drinks     Types: 2 drink(s) per week     Comment: occasional     Drug use: No     ANGI-7 SCORE 9/26/2019 3/5/2020 9/15/2020    Total Score - - -   Total Score 2 2 3     PHQ 9/26/2019 3/5/2020 9/15/2020   PHQ-9 Total Score 0 1 1   Q9: Thoughts of better off dead/self-harm past 2 weeks Not at all Not at all Not at all     See screeners    Insomnia- uses trazodone nightly, 50 works ok, 100 mg gives her HA, has not tried 75 mg    How many servings of fruits and vegetables do you eat daily?  2-3    On average, how many sweetened beverages do you drink each day (Examples: soda, juice, sweet tea, etc.  Do NOT count diet or artificially sweetened beverages)?   1    How many days per week do you exercise enough to make your heart beat faster? 7    How many minutes a day do you exercise enough to make your heart beat faster? 30 - 60    How many days per week do you miss taking your medication? 0        Review of Systems   Constitutional, HEENT, cardiovascular, pulmonary, gi and gu systems are negative, except as otherwise noted.      Objective    /86 (BP Location: Right arm, Patient Position: Sitting, Cuff Size: Adult Regular)   Pulse 65   Temp 97.9  F (36.6  C) (Oral)   Resp 20   Wt 55.3 kg (122 lb)   SpO2 97%   BMI 21.96 kg/m    Body mass index is 21.96 kg/m .  Physical Exam   GENERAL: healthy, alert and no distress  NECK: no adenopathy, no asymmetry, masses, or scars and thyroid normal to palpation  RESP: lungs clear to auscultation - no rales, rhonchi or wheezes  CV: regular rate and rhythm, normal S1 S2, no S3 or S4, no murmur, click or rub, no peripheral edema and peripheral pulses strong  ABDOMEN: soft, nontender, no hepatosplenomegaly, no masses and bowel sounds normal  MS: no gross musculoskeletal defects noted, no edema  PSYCH: mentation appears normal, affect normal/bright    Office Visit on 09/15/2020   Component Date Value Ref Range Status     Cholesterol 09/15/2020 231* 0 - 199 mg/dL Final     Triglycerides 09/15/2020 63  0 - 149 mg/dL Final     HDL Cholesterol 09/15/2020 70  40 - 150 mg/dL Final     LDL Cholesterol Direct  09/15/2020 148* 0 - 130 mg/dL Final     Cholesterol/HDL Ratio 09/15/2020 3  0 - 5 Final     Carbon Dioxide 09/15/2020 30.1  20 - 32 mmol/L Final     Creatinine 09/15/2020 0.81  0.70 - 1.18 mg/dL Final     Glucose 09/15/2020 100* 60 - 99 mg/dL Final     Sodium 09/15/2020 139.3  135 - 146 mmol/L Final     Potassium 09/15/2020 4.65  3.5 - 5.3 mmol/L Final     Chloride 09/15/2020 101.5  98 - 110 mmol/L Final     Protein Total 09/15/2020 7.0  6.1 - 8.1 g/dL Final     Albumin 09/15/2020 4.4  3.6 - 5.1 g/dL Final     Alkaline Phosphatase 09/15/2020 59  33 - 130 U/L Final     ALT 09/15/2020 3  0 - 32 U/L Final     AST 09/15/2020 9  0 - 35 U/L Final     Bilirubin Total 09/15/2020 1.0  0.2 - 1.2 mg/dL Final     Urea Nitrogen 09/15/2020 18  7 - 25 mg/dL Final     Calcium 09/15/2020 9.6  8.6 - 10.3 mg/dL Final     BUN/Creatinine Ratio 09/15/2020 22.2* 6 - 22 Final     Globulin Calculated 09/15/2020 2.6  1.9 - 3.7 Final     A/G Ratio 09/15/2020 1.7  1 - 2.5 Final

## 2021-03-16 ASSESSMENT — ANXIETY QUESTIONNAIRES: GAD7 TOTAL SCORE: 0

## 2021-05-05 DIAGNOSIS — N95.1 SYMPTOMATIC MENOPAUSAL OR FEMALE CLIMACTERIC STATES: ICD-10-CM

## 2021-05-06 RX ORDER — ESTRADIOL 0.05 MG/D
1 PATCH, EXTENDED RELEASE TRANSDERMAL
Qty: 24 PATCH | Refills: 1 | Status: SHIPPED | OUTPATIENT
Start: 2021-05-06 | End: 2021-10-21

## 2021-05-06 NOTE — TELEPHONE ENCOUNTER
Nathalie Mendosa is requesting a refill of:    Pending Prescriptions:                       Disp   Refills    estradiol (VIVELLE-DOT) 0.05 MG/24HR bi-w*24 pat*1            Sig: Place 1 patch onto the skin twice a week    Please close encounter if RX was sent. Thanks, Jory

## 2021-05-24 ENCOUNTER — OFFICE VISIT (OUTPATIENT)
Dept: FAMILY MEDICINE | Facility: CLINIC | Age: 61
End: 2021-05-24

## 2021-05-24 VITALS
TEMPERATURE: 97.9 F | HEIGHT: 63 IN | WEIGHT: 121 LBS | SYSTOLIC BLOOD PRESSURE: 128 MMHG | DIASTOLIC BLOOD PRESSURE: 82 MMHG | OXYGEN SATURATION: 98 % | BODY MASS INDEX: 21.44 KG/M2 | HEART RATE: 77 BPM

## 2021-05-24 DIAGNOSIS — N64.4 BREAST PAIN, RIGHT: Primary | ICD-10-CM

## 2021-05-24 DIAGNOSIS — N95.1 SYMPTOMATIC MENOPAUSAL OR FEMALE CLIMACTERIC STATES: ICD-10-CM

## 2021-05-24 PROCEDURE — 99213 OFFICE O/P EST LOW 20 MIN: CPT | Performed by: PHYSICIAN ASSISTANT

## 2021-05-24 ASSESSMENT — MIFFLIN-ST. JEOR: SCORE: 1070.04

## 2021-05-24 NOTE — NURSING NOTE
Nathalie is here for right breast pain, reoccuring pain from her past, would like a referral for a breast US.        Pre-visit Screening:  Immunizations:  up to date  Colonoscopy:  is due and to be scheduled by patient for later completion  Mammogram: JODIE  Asthma Action Test/Plan:  NA  PHQ9:  NA  GAD7:  JODIE  Questioned patient about current smoking habits Pt. has never smoked.  Ok to leave detailed message on voice mail for today's visit only Yes, phone # [phone]

## 2021-05-24 NOTE — PROGRESS NOTES
"CC: Breast pain    History:  3 weeks ago, Nathalie developed pain in upper part of right breast. Pain is there all the time. Pain is 5-6 out of 10 with 10 being the worst. Has been taking ibuprofen/Tylenol simultaneously with food, which helps for a period of time. Area does feel mildly itchy at times, but no obvious rash, nipple discharge. Denies any new lumps. No cough, fever, weight changes, shortness of breath.  Did have screening 3D mammogram 12/2020 that was normal. Pt does use estrogen patches, as she has since her hysterectomy nearly 20 years ago, but is trying to gradually decrease dose.     9 years ago in 2012 had diagnostic mammogram, tissue marker, and biopsy that showed fibrocystic change, microcalcification.     PMH, MEDICATIONS, ALLERGIES, SOCIAL AND FAMILY HISTORY in [facility] and reviewed by me personally.    ROS negative other than the symptoms noted above in the HPI.      Examination   /82 (BP Location: Right arm, Patient Position: Sitting, Cuff Size: Adult Regular)   Pulse 77   Temp 97.9  F (36.6  C) (Temporal)   Ht 1.588 m (5' 2.5\")   Wt 54.9 kg (121 lb)   SpO2 98%   BMI 21.78 kg/m       Constitutional: Sitting comfortably, in no acute distress. Vital signs noted  Neck:  no adenopathy, trachea midline and normal to palpation, thyroid normal to palpation  Cardiovascular:  regular rate and rhythm, no murmurs, clicks, or gallops  Respiratory:  normal respiratory rate and rhythm, lungs clear to auscultation  Breasts: skin without rash, no dominant mass, no nipple discharge, or axillary adenopathy. Tender to mild palpation of right breast 12 o'clock to 2 o'clock position.   SKIN: No jaundice/pallor/rash.   Psychiatric: mentation appears normal and affect normal/bright      A/P    ICD-10-CM    1. Breast pain, right  N64.4 Mammo diagnostic  digital (bilateral)*     US Breast Right Limited 1-3 Quadrants     CANCELED: US Breast Bilateral Limited 1-3 Quadrants       DISCUSSION:  Given significant " breast tenderness, in the setting of exogenous estrogen use, recommended Nathalie undergo diagnostic imaging of right breast. Ordered both mammogram and US to be used at the discretion of radiology team. May just be flare of fibrocystic changes, but need to rule out new breast abnormality. If normal imaging, would expect this to be musculoskeletal in nature. Would recommend heat, gentle stretching.     follow up visit: As needed    Faiza Hawthorne PA-C  [facility]

## 2021-05-28 ENCOUNTER — HOSPITAL ENCOUNTER (OUTPATIENT)
Dept: ULTRASOUND IMAGING | Facility: CLINIC | Age: 61
End: 2021-05-28
Attending: PHYSICIAN ASSISTANT
Payer: COMMERCIAL

## 2021-05-28 ENCOUNTER — HOSPITAL ENCOUNTER (OUTPATIENT)
Dept: MAMMOGRAPHY | Facility: CLINIC | Age: 61
End: 2021-05-28
Attending: PHYSICIAN ASSISTANT
Payer: COMMERCIAL

## 2021-05-28 DIAGNOSIS — N64.4 BREAST PAIN, RIGHT: ICD-10-CM

## 2021-05-28 PROCEDURE — 76642 ULTRASOUND BREAST LIMITED: CPT | Mod: RT

## 2021-05-28 PROCEDURE — G0279 TOMOSYNTHESIS, MAMMO: HCPCS

## 2021-06-30 ENCOUNTER — TRANSFERRED RECORDS (OUTPATIENT)
Dept: FAMILY MEDICINE | Facility: CLINIC | Age: 61
End: 2021-06-30

## 2021-09-02 ENCOUNTER — OFFICE VISIT (OUTPATIENT)
Dept: FAMILY MEDICINE | Facility: CLINIC | Age: 61
End: 2021-09-02

## 2021-09-02 VITALS
HEART RATE: 68 BPM | SYSTOLIC BLOOD PRESSURE: 124 MMHG | WEIGHT: 121 LBS | BODY MASS INDEX: 21.78 KG/M2 | DIASTOLIC BLOOD PRESSURE: 80 MMHG | OXYGEN SATURATION: 98 %

## 2021-09-02 DIAGNOSIS — N95.1 SYMPTOMATIC MENOPAUSAL OR FEMALE CLIMACTERIC STATES: ICD-10-CM

## 2021-09-02 DIAGNOSIS — G47.00 INSOMNIA, UNSPECIFIED TYPE: ICD-10-CM

## 2021-09-02 DIAGNOSIS — M25.552 CHRONIC LEFT HIP PAIN: ICD-10-CM

## 2021-09-02 DIAGNOSIS — F41.1 GAD (GENERALIZED ANXIETY DISORDER): ICD-10-CM

## 2021-09-02 DIAGNOSIS — E78.2 MIXED HYPERLIPIDEMIA: Primary | ICD-10-CM

## 2021-09-02 DIAGNOSIS — J30.2 SEASONAL ALLERGIC RHINITIS, UNSPECIFIED TRIGGER: ICD-10-CM

## 2021-09-02 DIAGNOSIS — G89.29 CHRONIC LEFT HIP PAIN: ICD-10-CM

## 2021-09-02 PROCEDURE — 99214 OFFICE O/P EST MOD 30 MIN: CPT | Performed by: FAMILY MEDICINE

## 2021-09-02 RX ORDER — LORAZEPAM 0.5 MG/1
0.5 TABLET ORAL EVERY 8 HOURS PRN
Qty: 30 TABLET | Refills: 0 | Status: SHIPPED | OUTPATIENT
Start: 2021-09-02 | End: 2022-03-22

## 2021-09-02 RX ORDER — TRAZODONE HYDROCHLORIDE 100 MG/1
100 TABLET ORAL AT BEDTIME
Qty: 90 TABLET | Refills: 1 | Status: SHIPPED | OUTPATIENT
Start: 2021-09-02 | End: 2022-03-22

## 2021-09-02 ASSESSMENT — ANXIETY QUESTIONNAIRES
2. NOT BEING ABLE TO STOP OR CONTROL WORRYING: NOT AT ALL
5. BEING SO RESTLESS THAT IT IS HARD TO SIT STILL: NOT AT ALL
7. FEELING AFRAID AS IF SOMETHING AWFUL MIGHT HAPPEN: NOT AT ALL
IF YOU CHECKED OFF ANY PROBLEMS ON THIS QUESTIONNAIRE, HOW DIFFICULT HAVE THESE PROBLEMS MADE IT FOR YOU TO DO YOUR WORK, TAKE CARE OF THINGS AT HOME, OR GET ALONG WITH OTHER PEOPLE: SOMEWHAT DIFFICULT
GAD7 TOTAL SCORE: 2
3. WORRYING TOO MUCH ABOUT DIFFERENT THINGS: NOT AT ALL
6. BECOMING EASILY ANNOYED OR IRRITABLE: SEVERAL DAYS
1. FEELING NERVOUS, ANXIOUS, OR ON EDGE: SEVERAL DAYS

## 2021-09-02 ASSESSMENT — PATIENT HEALTH QUESTIONNAIRE - PHQ9
5. POOR APPETITE OR OVEREATING: NOT AT ALL
SUM OF ALL RESPONSES TO PHQ QUESTIONS 1-9: 2

## 2021-09-02 NOTE — PROGRESS NOTES
Assessment & Plan     Mixed hyperlipidemia  Control uncertain, planning to return for labs asnot fasting  - Comprehensive Metobolic Panel (BFP)  - Lipid Panel (BFP)  - VENOUS COLLECTION    ANGI (generalized anxiety disorder)  Stable, continue current medications at current doses , I reviewed the risks, benefits, and possible side effects of the medication.  The patient had an opportunity to ask any questions regarding the treatment plan. The patient was encouraged to call my office if any problems.     Insomnia, unspecified type  Well controlled, continue current medications at current doses     Symptomatic menopausal or female climacteric states  Stable, continue current medications at current doses     Chronic left hip pain  Doing OK, has learned to live with this    Seasonal allergic rhinitis, unspecified trigger  Pt states allergies worst in years, wants to see allergy      Review of the result(s) of each unique test - labs  Ordering of each unique test  Prescription drug management         FUTURE APPOINTMENTS:       - Follow-up visit in 6 mo  Work on weight loss  Regular exercise    No follow-ups on file.    Sandoval Lamb MD  [facility]    Diane Zelaya is a 61 year old who presents for the following health issues     HPI     Hyperlipidemia Follow-Up      Are you regularly taking any medication or supplement to lower your cholesterol?   No    Are you having muscle aches or other side effects that you think could be caused by your cholesterol lowering medication?  No    Anxiety Follow-Up, rare lorazepam when hip flares    How are you doing with your anxiety since your last visit? No change    Are you having other symptoms that might be associated with anxiety? No    Have you had a significant life event? No just chronic hip issues     Are you feeling depressed? No    Do you have any concerns with your use of alcohol or other drugs? No    Social History     Tobacco Use      Smoking status: Never Smoker     Smokeless tobacco: Never Used   Substance Use Topics     Alcohol use: Yes     Alcohol/week: 1.7 standard drinks     Types: 2 drink(s) per week     Comment: occasional     Drug use: No     ANGI-7 SCORE 3/5/2020 9/15/2020 3/15/2021   Total Score - - -   Total Score 2 3 0     PHQ 3/5/2020 9/15/2020 3/15/2021   PHQ-9 Total Score 1 1 1   Q9: Thoughts of better off dead/self-harm past 2 weeks Not at all Not at all Not at all     See screeners    PT feels she had bad seasonal allergies all summer- tried clariritin, sudafed, dayquil, flonase and feels nothing worked    Insomnia OK- trazodone helps, no med side effects       How many servings of fruits and vegetables do you eat daily?  2-3    On average, how many sweetened beverages do you drink each day (Examples: soda, juice, sweet tea, etc.  Do NOT count diet or artificially sweetened beverages)?   1    How many days per week do you exercise enough to make your heart beat faster? 4    How many minutes a day do you exercise enough to make your heart beat faster? 20 - 29    How many days per week do you miss taking your medication? 0        Review of Systems   Constitutional, HEENT, cardiovascular, pulmonary, gi and gu systems are negative, except as otherwise noted.      Objective    /80 (BP Location: Right arm, Patient Position: Sitting, Cuff Size: Adult Regular)   Pulse 68   Wt 54.9 kg (121 lb)   SpO2 98%   BMI 21.78 kg/m    Body mass index is 21.78 kg/m .  Physical Exam   GENERAL: healthy, alert and no distress  NECK: no adenopathy, no asymmetry, masses, or scars and thyroid normal to palpation  RESP: lungs clear to auscultation - no rales, rhonchi or wheezes  CV: regular rate and rhythm, normal S1 S2, no S3 or S4, no murmur, click or rub, no peripheral edema and peripheral pulses strong  ABDOMEN: soft, nontender, no hepatosplenomegaly, no masses and bowel sounds normal  MS: no gross musculoskeletal defects noted, no  edema  PSYCH: mentation appears normal, affect normal/bright    screeners

## 2021-09-02 NOTE — NURSING NOTE
Chief Complaint   Patient presents with     Recheck Medication     nonfasting     Pre-Visit Screening:  Immunizations: TDAP - declined, Zoster (advised pharmacy)  Colonoscopy: Due and declined  Mammogram: UTD  Asthma Action Test/Plan: NA  PHQ9: done today   GAD7: done today  Questioned patient about current smoking habits Pt. never  OK to leave a detailed message on voice mail for today's visit  YES, phone # [phone]

## 2021-09-03 ASSESSMENT — ANXIETY QUESTIONNAIRES: GAD7 TOTAL SCORE: 2

## 2021-09-24 DIAGNOSIS — G47.00 INSOMNIA, UNSPECIFIED TYPE: ICD-10-CM

## 2021-09-24 RX ORDER — TRAZODONE HYDROCHLORIDE 100 MG/1
TABLET ORAL
Qty: 90 TABLET | Refills: 1 | COMMUNITY
Start: 2021-09-24

## 2021-09-24 NOTE — TELEPHONE ENCOUNTER
Nathalie Mendosa is requesting a refill of:    Refused Prescriptions:                       Disp   Refills    traZODone (DESYREL) 100 MG tablet [Pharmac*90 tab*1        Sig: TAKE 1 TABLET BY MOUTH EVERY NIGHT AT BEDTIME  Refused By: BRENDA WERNER  Reason for Refusal: Should already have refills on file  Reason for Refusal Comment: Refilled on 09/02/21 for 90 tablets with 1 refill

## 2021-10-11 ENCOUNTER — MYC MEDICAL ADVICE (OUTPATIENT)
Dept: FAMILY MEDICINE | Facility: CLINIC | Age: 61
End: 2021-10-11

## 2021-10-21 DIAGNOSIS — N95.1 SYMPTOMATIC MENOPAUSAL OR FEMALE CLIMACTERIC STATES: ICD-10-CM

## 2021-10-21 NOTE — TELEPHONE ENCOUNTER
Pt last seen 09/02/21 this was discussed. Notes say stable continue dose. Please advise. Pending patches for 1 year.    Nathalie Mendosa is requesting a refill of:    Pending Prescriptions:                       Disp   Refills    estradiol (VIVELLE-DOT) 0.05 MG/24HR bi-w*24 pat*3            Sig: PLACE 1 PATCH ON THE SKIN TWICE A WEEK

## 2021-10-23 ENCOUNTER — HEALTH MAINTENANCE LETTER (OUTPATIENT)
Age: 61
End: 2021-10-23

## 2021-10-24 RX ORDER — ESTRADIOL 0.05 MG/D
PATCH, EXTENDED RELEASE TRANSDERMAL
Qty: 24 PATCH | Refills: 3 | Status: SHIPPED | OUTPATIENT
Start: 2021-10-24 | End: 2022-09-07

## 2021-10-26 DIAGNOSIS — E78.2 MIXED HYPERLIPIDEMIA: ICD-10-CM

## 2021-10-26 LAB
ALBUMIN SERPL-MCNC: 4.3 G/DL (ref 3.6–5.1)
ALBUMIN/GLOB SERPL: 1.7 {RATIO} (ref 1–2.5)
ALP SERPL-CCNC: 59 U/L (ref 33–130)
ALT 1742-6: 8 U/L (ref 0–32)
AST 1920-8: 14 U/L (ref 0–35)
BILIRUB SERPL-MCNC: 1.4 MG/DL (ref 0.2–1.2)
BUN SERPL-MCNC: 18 MG/DL (ref 7–25)
BUN/CREATININE RATIO: 19.6 (ref 6–22)
CALCIUM SERPL-MCNC: 9.5 MG/DL (ref 8.6–10.3)
CHLORIDE SERPLBLD-SCNC: 100.3 MMOL/L (ref 98–110)
CHOLEST SERPL-MCNC: 229 MG/DL (ref 0–199)
CHOLEST/HDLC SERPL: 3 {RATIO} (ref 0–5)
CO2 SERPL-SCNC: 30.5 MMOL/L (ref 20–32)
CREAT SERPL-MCNC: 0.92 MG/DL (ref 0.6–1.3)
GLOBULIN, CALCULATED - QUEST: 2.6 (ref 1.9–3.7)
GLUCOSE SERPL-MCNC: 89 MG/DL (ref 60–99)
HDLC SERPL-MCNC: 72 MG/DL (ref 40–150)
LDLC SERPL CALC-MCNC: 138 MG/DL (ref 0–130)
POTASSIUM SERPL-SCNC: 4.33 MMOL/L (ref 3.5–5.3)
PROT SERPL-MCNC: 6.9 G/DL (ref 6.1–8.1)
SODIUM SERPL-SCNC: 137 MMOL/L (ref 135–146)
TRIGL SERPL-MCNC: 9 MG/DL (ref 0–149)

## 2021-10-26 PROCEDURE — 80053 COMPREHEN METABOLIC PANEL: CPT | Performed by: FAMILY MEDICINE

## 2021-10-26 PROCEDURE — 36415 COLL VENOUS BLD VENIPUNCTURE: CPT | Performed by: FAMILY MEDICINE

## 2021-10-26 PROCEDURE — 80061 LIPID PANEL: CPT | Performed by: FAMILY MEDICINE

## 2021-10-26 PROCEDURE — 90471 IMMUNIZATION ADMIN: CPT | Performed by: FAMILY MEDICINE

## 2021-10-26 PROCEDURE — 90686 IIV4 VACC NO PRSV 0.5 ML IM: CPT | Performed by: FAMILY MEDICINE

## 2022-02-12 ENCOUNTER — HEALTH MAINTENANCE LETTER (OUTPATIENT)
Age: 62
End: 2022-02-12

## 2022-03-22 ENCOUNTER — OFFICE VISIT (OUTPATIENT)
Dept: FAMILY MEDICINE | Facility: CLINIC | Age: 62
End: 2022-03-22

## 2022-03-22 VITALS
HEART RATE: 72 BPM | HEIGHT: 63 IN | DIASTOLIC BLOOD PRESSURE: 82 MMHG | WEIGHT: 123.2 LBS | TEMPERATURE: 97.4 F | SYSTOLIC BLOOD PRESSURE: 136 MMHG | BODY MASS INDEX: 21.83 KG/M2 | RESPIRATION RATE: 20 BRPM

## 2022-03-22 DIAGNOSIS — G47.00 INSOMNIA, UNSPECIFIED TYPE: ICD-10-CM

## 2022-03-22 DIAGNOSIS — Z12.11 SCREENING FOR COLON CANCER: ICD-10-CM

## 2022-03-22 DIAGNOSIS — E78.2 MIXED HYPERLIPIDEMIA: Primary | ICD-10-CM

## 2022-03-22 DIAGNOSIS — N95.1 SYMPTOMATIC MENOPAUSAL OR FEMALE CLIMACTERIC STATES: ICD-10-CM

## 2022-03-22 DIAGNOSIS — Z71.89 ACP (ADVANCE CARE PLANNING): ICD-10-CM

## 2022-03-22 DIAGNOSIS — M25.552 CHRONIC LEFT HIP PAIN: ICD-10-CM

## 2022-03-22 DIAGNOSIS — F41.1 GAD (GENERALIZED ANXIETY DISORDER): ICD-10-CM

## 2022-03-22 DIAGNOSIS — G89.29 CHRONIC LEFT HIP PAIN: ICD-10-CM

## 2022-03-22 PROCEDURE — 99214 OFFICE O/P EST MOD 30 MIN: CPT | Performed by: FAMILY MEDICINE

## 2022-03-22 RX ORDER — TRAZODONE HYDROCHLORIDE 100 MG/1
50-100 TABLET ORAL AT BEDTIME
Qty: 90 TABLET | Refills: 1 | Status: SHIPPED | OUTPATIENT
Start: 2022-03-22 | End: 2022-10-03

## 2022-03-22 RX ORDER — LORAZEPAM 0.5 MG/1
0.5 TABLET ORAL EVERY 8 HOURS PRN
Qty: 30 TABLET | Refills: 0 | Status: SHIPPED | OUTPATIENT
Start: 2022-03-22 | End: 2022-10-03

## 2022-03-22 NOTE — NURSING NOTE
Nathalie Mendosa is here for a medication check and refill.    Questioned patient about current smoking habits.  Pt. has never smoked.  PULSE regular  My Chart: active  CLASSIFICATION OF OVERWEIGHT AND OBESITY BY BMI                        Obesity Class           BMI(kg/m2)  Underweight                                    < 18.5  Normal                                         18.5-24.9  Overweight                                     25.0-29.9  OBESITY                     I                  30.0-34.9                             II                 35.0-39.9  EXTREME OBESITY             III                >40                            Patient's  BMI Body mass index is 22.17 kg/m .  http://hin.nhlbi.nih.gov/menuplanner/menu.cgi  Pre-visit planning  Immunizations - needs Td  Colonoscopy - is due and ordered today  Mammogram - is up to date  Asthma -   PHQ9 -    ANGI-7 -

## 2022-03-22 NOTE — LETTER
My Depression Action Plan  Name: Nathalie Mendosa   Date of Birth 1960  Date: 3/22/2022    My doctor: Sandoval Lamb   My clinic: [facility]  [address] [place] [address]  [address]  [facility] [id number] [phone]          GREEN    ZONE   Good Control    What it looks like:     Things are going generally well. You have normal ups and downs. You may even feel depressed from time to time, but bad moods usually last less than a day.   What you need to do:  1. Continue to care for yourself (see self care plan)  2. Check your depression survival kit and update it as needed  3. Follow your physician s recommendations including any medication.  4. Do not stop taking medication unless you consult with your physician first.           YELLOW         ZONE Getting Worse    What it looks like:     Depression is starting to interfere with your life.     It may be hard to get out of bed; you may be starting to isolate yourself from others.    Symptoms of depression are starting to last most all day and this has happened for several days.     You may have suicidal thoughts but they are not constant.   What you need to do:     1. Call your care team. Your response to treatment will improve if you keep your care team informed of your progress. Yellow periods are signs an adjustment may need to be made.     2. Continue your self-care.  Just get dressed and ready for the day.  Don't give yourself time to talk yourself out of it.    3. Talk to someone in your support network.    4. Open up your Depression Self-Care Plan/Wellness Kit.           RED    ZONE Medical Alert - Get Help    What it looks like:     Depression is seriously interfering with your life.     You may experience these or other symptoms: You can t get out of bed most days, can t work or engage in other necessary activities, you have trouble taking care of basic hygiene, or basic responsibilities, thoughts of suicide or death  that will not go away, self-injurious behavior.     What you need to do:  1. Call your care team and request a same-day appointment. If they are not available (weekends or after hours) call your local crisis line, emergency room or 911.          Depression Self-Care Plan / Wellness Kit    Many people find that medication and therapy are helpful treatments for managing depression. In addition, making small changes to your everyday life can help to boost your mood and improve your wellbeing. Below are some tips for you to consider. Be sure to talk with your medical provider and/or behavioral health consultant if your symptoms are worsening or not improving.     Sleep   Sleep hygiene  means all of the habits that support good, restful sleep. It includes maintaining a consistent bedtime and wake time, using your bedroom only for sleeping or sex, and keeping the bedroom dark and free of distractions like a computer, smartphone, or television.     Develop a Healthy Routine  Maintain good hygiene. Get out of bed in the morning, make your bed, brush your teeth, take a shower, and get dressed. Don t spend too much time viewing media that makes you feel stressed. Find time to relax each day.    Exercise  Get some form of exercise every day. This will help reduce pain and release endorphins, the  feel good  chemicals in your brain. It can be as simple as just going for a walk or doing some gardening, anything that will get you moving.      Diet  Strive to eat healthy foods, including fruits and vegetables. Drink plenty of water. Avoid excessive sugar, caffeine, alcohol, and other mood-altering substances.     Stay Connected with Others  Stay in touch with friends and family members.    Manage Your Mood  Try deep breathing, massage therapy, biofeedback, or meditation. Take part in fun activities when you can. Try to find something to smile about each day.     Psychotherapy  Be open to working with a therapist if your provider  recommends it.     Medication  Be sure to take your medication as prescribed. Most anti-depressants need to be taken every day. It usually takes several weeks for medications to work. Not all medicines work for all people. It is important to follow-up with your provider to make sure you have a treatment plan that is working for you. Do not stop your medication abruptly without first discussing it with your provider.    Crisis Resources   These hotlines are for both adults and children. They and are open 24 hours a day, 7 days a week unless noted otherwise.      National Suicide Prevention Lifeline   0-821-159-DRJA (8707)      Crisis Text Line    www.crisistextline.org  Text HOME to 446207 from anywhere in the [place], anytime, about any type of crisis. A live, trained crisis counselor will receive the text and respond quickly.      [place] Lifeline for LGBTQ Youth  A national crisis intervention and suicide lifeline for LGBTQ youth under 25. Provides a safe place to talk without judgement. Call 1-417.637.6872; text START to 411209 or visit www.thetrevorproject.org to talk to a trained counselor.      For [facility] crisis numbers, visit the [facility] website at:  https://[place].gov/dhs/people-we-serve/adults/health-care/mental-health/resources/crisis-contacts.jsp

## 2022-03-22 NOTE — PROGRESS NOTES
Assessment & Plan     Mixed hyperlipidemia  Control fine last check, Continue to work on healthy diet and exercise, discussed healthy habits     ANGI (generalized anxiety disorder)  Well controlled overall, still uses rare ativan-#30 in 6 mo    Insomnia, unspecified type  Well controlled, continue current medications at current doses     Symptomatic menopausal or female climacteric states  Stable, continue current medications at current doses     Chronic left hip pain  Stable, working with ortho      Review of the result(s) of each unique test - previous labs  Ordering of each unique test  Prescription drug management         FUTURE APPOINTMENTS:       - Follow-up visit in 6 mo  Regular exercise    No follow-ups on file.    Sandoval Lamb MD  [facility]    Diane Zelaya is a 62 year old who presents for the following health issues     HPI     Anxiety Kcfcgn-Ic-syopebz to chronic hip pains, uses rare ativan when hip bad and she is down for a few days    How are you doing with your anxiety since your last visit? Worsened slightly during long winter    Are you having other symptoms that might be associated with anxiety? No    Have you had a significant life event? No     Are you feeling depressed? No    Do you have any concerns with your use of alcohol or other drugs? No    Social History     Tobacco Use     Smoking status: Never Smoker     Smokeless tobacco: Never Used   Substance Use Topics     Alcohol use: Yes     Alcohol/week: 1.7 standard drinks     Types: 2 drink(s) per week     Comment: occasional     Drug use: No     ANGI-7 SCORE 9/15/2020 3/15/2021 9/2/2021   Total Score - - -   Total Score 3 0 2     PHQ 9/15/2020 3/15/2021 9/2/2021   PHQ-9 Total Score 1 1 2   Q9: Thoughts of better off dead/self-harm past 2 weeks Not at all Not at all Not at all     ANGI-7  9/2/2021   1. Feeling nervous, anxious, or on edge 1   2. Not being able to stop or control worrying 0   3. Worrying too  "much about different things 0   4. Trouble relaxing 0   5. Being so restless that it is hard to sit still 0   6. Becoming easily annoyed or irritable 1   7. Feeling afraid, as if something awful might happen 0   ANGI-7 Total Score 2   If you checked any problems, how difficult have they made it for you to do your work, take care of things at home, or get along with other people? Somewhat difficult         How many servings of fruits and vegetables do you eat daily?  2-3    On average, how many sweetened beverages do you drink each day (Examples: soda, juice, sweet tea, etc.  Do NOT count diet or artificially sweetened beverages)?   1    How many days per week do you exercise enough to make your heart beat faster? 6    How many minutes a day do you exercise enough to make your heart beat faster? 30 - 60    How many days per week do you miss taking your medication? 0    Insomnia- trazadone helping, no med side effects although at 100 mg a bit \"strong\"-sometimes takes 1/2    Left hip pain stable, gets injection every 3 mo, still seems main issue in her life    Review of Systems   Constitutional, HEENT, cardiovascular, pulmonary, gi and gu systems are negative, except as otherwise noted.      Objective    /82 (BP Location: Left arm, Patient Position: Chair, Cuff Size: Adult Regular)   Pulse 72   Temp 97.4  F (36.3  C)   Ht 1.588 m (5' 2.5\")   Wt 55.9 kg (123 lb 3.2 oz)   BMI 22.17 kg/m    Body mass index is 22.17 kg/m .  Physical Exam   GENERAL: healthy, alert and no distress  EYES: Eyes grossly normal to inspection, PERRL and conjunctivae and sclerae normal  HENT: ear canals and TM's normal, nose and mouth without ulcers or lesions  NECK: no adenopathy, no asymmetry, masses, or scars and thyroid normal to palpation  RESP: lungs clear to auscultation - no rales, rhonchi or wheezes  CV: regular rate and rhythm, normal S1 S2, no S3 or S4, no murmur, click or rub, no peripheral edema and peripheral pulses " strong  ABDOMEN: soft, nontender, no hepatosplenomegaly, no masses and bowel sounds normal  MS: no gross musculoskeletal defects noted, no edema  PSYCH: mentation appears normal, affect normal/bright    Orders Only on 10/26/2021   Component Date Value Ref Range Status     Cholesterol 10/26/2021 229 (A) 0 - 199 mg/dL Final     Triglycerides 10/26/2021 9  0 - 149 mg/dL Final     HDL Cholesterol 10/26/2021 72  40 - 150 mg/dL Final     LDL Cholesterol Direct 10/26/2021 138 (A) 0 - 130 mg/dL Final     Cholesterol/HDL Ratio 10/26/2021 3  0 - 5 Final     Carbon Dioxide 10/26/2021 30.5  20 - 32 mmol/L Final     Creatinine 10/26/2021 0.92  0.60 - 1.30 mg/dL Final     Glucose 10/26/2021 89  60 - 99 mg/dL Final     Sodium 10/26/2021 137.0  135 - 146 mmol/L Final     Potassium 10/26/2021 4.33  3.5 - 5.3 mmol/L Final     Chloride 10/26/2021 100.3  98 - 110 mmol/L Final     Protein Total 10/26/2021 6.9  6.1 - 8.1 g/dL Final     Albumin 10/26/2021 4.3  3.6 - 5.1 g/dL Final     Alkaline Phosphatase 10/26/2021 59  33 - 130 U/L Final     ALT 10/26/2021 8  0 - 32 U/L Final     AST 10/26/2021 14  0 - 35 U/L Final     Bilirubin Total 10/26/2021 1.4 (A) 0.2 - 1.2 mg/dL Final     Urea Nitrogen 10/26/2021 18  7 - 25 mg/dL Final     Calcium 10/26/2021 9.5  8.6 - 10.3 mg/dL Final     BUN/Creatinine Ratio 10/26/2021 19.6  6 - 22 Final     Globulin Calculated 10/26/2021 2.6  1.9 - 3.7 Final     A/G Ratio 10/26/2021 1.7  1 - 2.5 Final

## 2022-09-02 DIAGNOSIS — G47.00 INSOMNIA, UNSPECIFIED TYPE: ICD-10-CM

## 2022-09-02 RX ORDER — TRAZODONE HYDROCHLORIDE 100 MG/1
TABLET ORAL
Qty: 90 TABLET | Refills: 1 | COMMUNITY
Start: 2022-09-02

## 2022-09-05 DIAGNOSIS — N95.1 SYMPTOMATIC MENOPAUSAL OR FEMALE CLIMACTERIC STATES: ICD-10-CM

## 2022-09-07 RX ORDER — ESTRADIOL 0.05 MG/D
PATCH, EXTENDED RELEASE TRANSDERMAL
Qty: 24 PATCH | Refills: 0 | Status: SHIPPED | OUTPATIENT
Start: 2022-09-07 | End: 2022-10-03

## 2022-09-07 NOTE — TELEPHONE ENCOUNTER
Nathalie Mendosa is requesting a refill of:    Pending Prescriptions:                       Disp   Refills    estradiol (VIVELLE-DOT) 0.05 MG/24HR bi-w*24 pat*0            Sig: PLACE 1 PATCH ON THE SKIN TWICE A WEEK    Needs OV for refills

## 2022-09-21 ENCOUNTER — TRANSFERRED RECORDS (OUTPATIENT)
Dept: FAMILY MEDICINE | Facility: CLINIC | Age: 62
End: 2022-09-21

## 2022-10-03 ENCOUNTER — OFFICE VISIT (OUTPATIENT)
Dept: FAMILY MEDICINE | Facility: CLINIC | Age: 62
End: 2022-10-03

## 2022-10-03 VITALS
WEIGHT: 124.8 LBS | TEMPERATURE: 97 F | SYSTOLIC BLOOD PRESSURE: 132 MMHG | RESPIRATION RATE: 20 BRPM | BODY MASS INDEX: 22.11 KG/M2 | HEART RATE: 64 BPM | DIASTOLIC BLOOD PRESSURE: 82 MMHG | HEIGHT: 63 IN

## 2022-10-03 DIAGNOSIS — Z23 FLU VACCINE NEED: ICD-10-CM

## 2022-10-03 DIAGNOSIS — G47.00 INSOMNIA, UNSPECIFIED TYPE: ICD-10-CM

## 2022-10-03 DIAGNOSIS — N95.1 SYMPTOMATIC MENOPAUSAL OR FEMALE CLIMACTERIC STATES: ICD-10-CM

## 2022-10-03 DIAGNOSIS — M25.552 CHRONIC LEFT HIP PAIN: ICD-10-CM

## 2022-10-03 DIAGNOSIS — G89.29 CHRONIC LEFT HIP PAIN: ICD-10-CM

## 2022-10-03 DIAGNOSIS — F41.1 GAD (GENERALIZED ANXIETY DISORDER): ICD-10-CM

## 2022-10-03 DIAGNOSIS — E78.2 MIXED HYPERLIPIDEMIA: Primary | ICD-10-CM

## 2022-10-03 PROCEDURE — 90686 IIV4 VACC NO PRSV 0.5 ML IM: CPT | Performed by: FAMILY MEDICINE

## 2022-10-03 PROCEDURE — 90471 IMMUNIZATION ADMIN: CPT | Performed by: FAMILY MEDICINE

## 2022-10-03 PROCEDURE — 99214 OFFICE O/P EST MOD 30 MIN: CPT | Mod: 25 | Performed by: FAMILY MEDICINE

## 2022-10-03 RX ORDER — ESTRADIOL 0.05 MG/D
1 PATCH, EXTENDED RELEASE TRANSDERMAL
Qty: 24 PATCH | Refills: 1 | Status: SHIPPED | OUTPATIENT
Start: 2022-10-03 | End: 2022-11-29

## 2022-10-03 RX ORDER — TRAZODONE HYDROCHLORIDE 100 MG/1
50-100 TABLET ORAL AT BEDTIME
Qty: 90 TABLET | Refills: 1 | Status: SHIPPED | OUTPATIENT
Start: 2022-10-03 | End: 2023-04-13

## 2022-10-03 RX ORDER — LORAZEPAM 0.5 MG/1
0.5 TABLET ORAL EVERY 8 HOURS PRN
Qty: 30 TABLET | Refills: 0 | Status: SHIPPED | OUTPATIENT
Start: 2022-10-03 | End: 2023-03-21

## 2022-10-03 ASSESSMENT — ANXIETY QUESTIONNAIRES
GAD7 TOTAL SCORE: 5
2. NOT BEING ABLE TO STOP OR CONTROL WORRYING: NOT AT ALL
1. FEELING NERVOUS, ANXIOUS, OR ON EDGE: SEVERAL DAYS
3. WORRYING TOO MUCH ABOUT DIFFERENT THINGS: SEVERAL DAYS
IF YOU CHECKED OFF ANY PROBLEMS ON THIS QUESTIONNAIRE, HOW DIFFICULT HAVE THESE PROBLEMS MADE IT FOR YOU TO DO YOUR WORK, TAKE CARE OF THINGS AT HOME, OR GET ALONG WITH OTHER PEOPLE: SOMEWHAT DIFFICULT
7. FEELING AFRAID AS IF SOMETHING AWFUL MIGHT HAPPEN: SEVERAL DAYS
5. BEING SO RESTLESS THAT IT IS HARD TO SIT STILL: SEVERAL DAYS
6. BECOMING EASILY ANNOYED OR IRRITABLE: NOT AT ALL
GAD7 TOTAL SCORE: 5

## 2022-10-03 ASSESSMENT — PATIENT HEALTH QUESTIONNAIRE - PHQ9
SUM OF ALL RESPONSES TO PHQ QUESTIONS 1-9: 1
5. POOR APPETITE OR OVEREATING: SEVERAL DAYS

## 2022-10-03 NOTE — NURSING NOTE
Nathalie Mendosa is here for fasting blood work and medication refill.  Questioned patient about current smoking habits.  Pt. has never smoked.  Body mass index is 33.67 kg/(m^2).  PULSE regular  My Chart: active    Pre-visit planning  Immunizations - flu vac  Colonoscopy - ordered  Mammogram - is up to date  Asthma -   PHQ9  ANGI-7

## 2022-10-03 NOTE — PROGRESS NOTES
Assessment & Plan     Mixed hyperlipidemia  Controlled based on last labs -no changes in diet/weight, Continue to work on healthy diet and exercise, discussed healthy habits     ANGI (generalized anxiety disorder)  Well controlled, continue current medications at current doses , OK for #30 lorazepam every 6 mo  - LORazepam (ATIVAN) 0.5 MG tablet  Dispense: 30 tablet; Refill: 0    Chronic left hip pain  Had recent injection, doing better    Insomnia, unspecified type  Well controlled, continue current medications at current doses   - traZODone (DESYREL) 100 MG tablet  Dispense: 90 tablet; Refill: 1    Symptomatic menopausal or female climacteric states  stable symptomatically , continue current medications at current doses , pt understands risks  - estradiol (VIVELLE-DOT) 0.05 MG/24HR bi-weekly patch  Dispense: 24 patch; Refill: 1    Flu vaccine need    - FLU VAC PRESRV FREE QUAD SPLIT VIR 3+YRS IM      Review of the result(s) of each unique test - labs  Ordering of each unique test  Prescription drug management         FUTURE APPOINTMENTS:       - Follow-up visit in 6 mo  Regular exercise    No follow-ups on file.    Sandoval Lamb MD  [facility]    Diane Zelaya is a 62 year old, presenting for the following health issues:  Recheck Medication      HPI     Hyperlipidemia Follow-Up- CV risk score OK      Are you regularly taking any medication or supplement to lower your cholesterol?   No    Are you having muscle aches or other side effects that you think could be caused by your cholesterol lowering medication?  No    Anxiety Follow-Up    How are you doing with your anxiety since your last visit? No change    Are you having other symptoms that might be associated with anxiety? No    Have you had a significant life event? No     Are you feeling depressed? No    Do you have any concerns with your use of alcohol or other drugs? No    Social History     Tobacco Use     Smoking status:  "Never Smoker     Smokeless tobacco: Never Used   Substance Use Topics     Alcohol use: Yes     Alcohol/week: 1.7 standard drinks     Types: 2 drink(s) per week     Comment: occasional     Drug use: No     ANGI-7 SCORE 9/15/2020 3/15/2021 9/2/2021   Total Score - - -   Total Score 3 0 2     PHQ 9/15/2020 3/15/2021 9/2/2021   PHQ-9 Total Score 1 1 2   Q9: Thoughts of better off dead/self-harm past 2 weeks Not at all Not at all Not at all     Mood screeners done    Left hip flared up recently- saw ortho and had injection and it hlped some    Pt using estrogen patch, feels she needs for mood swings- she has discussed risks in past    Pt using trazodone for sleep, working pretty well, no side effects     How many servings of fruits and vegetables do you eat daily?  2-3    On average, how many sweetened beverages do you drink each day (Examples: soda, juice, sweet tea, etc.  Do NOT count diet or artificially sweetened beverages)?   1    How many days per week do you exercise enough to make your heart beat faster? 6    How many minutes a day do you exercise enough to make your heart beat faster? 30 - 60    How many days per week do you miss taking your medication? 0        Review of Systems   Constitutional, HEENT, cardiovascular, pulmonary, gi and gu systems are negative, except as otherwise noted.      Objective    /82 (BP Location: Left arm, Patient Position: Chair, Cuff Size: Adult Regular)   Pulse 64   Temp 97  F (36.1  C) (Temporal)   Resp 20   Ht 1.594 m (5' 2.75\")   Wt 56.6 kg (124 lb 12.8 oz)   BMI 22.28 kg/m    Body mass index is 22.28 kg/m .  Physical Exam   GENERAL: healthy, alert and no distress  NECK: no adenopathy, no asymmetry, masses, or scars and thyroid normal to palpation  RESP: lungs clear to auscultation - no rales, rhonchi or wheezes  CV: regular rate and rhythm, normal S1 S2, no S3 or S4, no murmur, click or rub, no peripheral edema and peripheral pulses strong  ABDOMEN: soft, nontender, " no hepatosplenomegaly, no masses and bowel sounds normal  MS: no gross musculoskeletal defects noted, no edema  PSYCH: mentation appears normal, affect normal/bright    Orders Only on 10/26/2021   Component Date Value Ref Range Status     Cholesterol 10/26/2021 229 (A) 0 - 199 mg/dL Final     Triglycerides 10/26/2021 9  0 - 149 mg/dL Final     HDL Cholesterol 10/26/2021 72  40 - 150 mg/dL Final     LDL Cholesterol Direct 10/26/2021 138 (A) 0 - 130 mg/dL Final     Cholesterol/HDL Ratio 10/26/2021 3  0 - 5 Final     Carbon Dioxide 10/26/2021 30.5  20 - 32 mmol/L Final     Creatinine 10/26/2021 0.92  0.60 - 1.30 mg/dL Final     Glucose 10/26/2021 89  60 - 99 mg/dL Final     Sodium 10/26/2021 137.0  135 - 146 mmol/L Final     Potassium 10/26/2021 4.33  3.5 - 5.3 mmol/L Final     Chloride 10/26/2021 100.3  98 - 110 mmol/L Final     Protein Total 10/26/2021 6.9  6.1 - 8.1 g/dL Final     Albumin 10/26/2021 4.3  3.6 - 5.1 g/dL Final     Alkaline Phosphatase 10/26/2021 59  33 - 130 U/L Final     ALT 10/26/2021 8  0 - 32 U/L Final     AST 10/26/2021 14  0 - 35 U/L Final     Bilirubin Total 10/26/2021 1.4 (A) 0.2 - 1.2 mg/dL Final     Urea Nitrogen 10/26/2021 18  7 - 25 mg/dL Final     Calcium 10/26/2021 9.5  8.6 - 10.3 mg/dL Final     BUN/Creatinine Ratio 10/26/2021 19.6  6 - 22 Final     Globulin Calculated 10/26/2021 2.6  1.9 - 3.7 Final     A/G Ratio 10/26/2021 1.7  1 - 2.5 Final

## 2022-11-28 DIAGNOSIS — N95.1 SYMPTOMATIC MENOPAUSAL OR FEMALE CLIMACTERIC STATES: ICD-10-CM

## 2022-11-29 ENCOUNTER — MYC MEDICAL ADVICE (OUTPATIENT)
Dept: FAMILY MEDICINE | Facility: CLINIC | Age: 62
End: 2022-11-29

## 2022-11-29 DIAGNOSIS — N95.1 SYMPTOMATIC MENOPAUSAL OR FEMALE CLIMACTERIC STATES: ICD-10-CM

## 2022-11-29 RX ORDER — ESTRADIOL 0.05 MG/D
PATCH, EXTENDED RELEASE TRANSDERMAL
Qty: 24 PATCH | Refills: 3 | COMMUNITY
Start: 2022-11-29

## 2022-11-29 RX ORDER — ESTRADIOL 0.05 MG/D
1 PATCH, EXTENDED RELEASE TRANSDERMAL
Qty: 24 PATCH | Refills: 1 | Status: SHIPPED | OUTPATIENT
Start: 2022-12-01 | End: 2023-05-16

## 2022-11-29 NOTE — TELEPHONE ENCOUNTER
Nathalie Mendosa is requesting a refill of:    Refused Prescriptions:                       Disp   Refills    estradiol (VIVELLE-DOT) 0.05 MG/24HR bi-we*24 pat*3        Sig: APPLY 1 PATCH ON THE SKIN TWICE A WEEK (NEED OFFICE           VISIT FOR REFILLS)  Refused By: BRENDA WERNER  Reason for Refusal: Patient should contact provider first    See MyChart message

## 2022-11-29 NOTE — TELEPHONE ENCOUNTER
Please resend pt's Estradiol patches to [facility]. Was sent to [facility] as an error at her appointment.    Nathalie Mendosa is requesting a refill of:    Pending Prescriptions:                       Disp   Refills    estradiol (VIVELLE-DOT) 0.05 MG/24HR bi-w*24 pat*1            Sig: Place 1 patch onto the skin twice a week

## 2023-03-20 ENCOUNTER — OFFICE VISIT (OUTPATIENT)
Dept: FAMILY MEDICINE | Facility: CLINIC | Age: 63
End: 2023-03-20

## 2023-03-20 VITALS
BODY MASS INDEX: 22.5 KG/M2 | TEMPERATURE: 98.1 F | OXYGEN SATURATION: 98 % | HEART RATE: 82 BPM | WEIGHT: 127 LBS | HEIGHT: 63 IN | DIASTOLIC BLOOD PRESSURE: 84 MMHG | SYSTOLIC BLOOD PRESSURE: 134 MMHG
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
## 2023-03-20 PROCEDURE — 99213 OFFICE O/P EST LOW 20 MIN: CPT | Performed by: PHYSICIAN ASSISTANT
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
## 2023-03-21 ENCOUNTER — OFFICE VISIT (OUTPATIENT)
Dept: FAMILY MEDICINE | Facility: CLINIC | Age: 63
End: 2023-03-21

## 2023-03-21 VITALS
WEIGHT: 127 LBS | HEART RATE: 72 BPM | SYSTOLIC BLOOD PRESSURE: 132 MMHG | RESPIRATION RATE: 20 BRPM | OXYGEN SATURATION: 99 % | BODY MASS INDEX: 22.68 KG/M2 | TEMPERATURE: 98 F | DIASTOLIC BLOOD PRESSURE: 92 MMHG

## 2023-03-21 DIAGNOSIS — E78.2 MIXED HYPERLIPIDEMIA: ICD-10-CM

## 2023-03-21 DIAGNOSIS — N95.1 SYMPTOMATIC MENOPAUSAL OR FEMALE CLIMACTERIC STATES: ICD-10-CM

## 2023-03-21 DIAGNOSIS — F41.1 GAD (GENERALIZED ANXIETY DISORDER): ICD-10-CM
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
## 2023-03-21 DIAGNOSIS — R21 RASH AND NONSPECIFIC SKIN ERUPTION: ICD-10-CM

## 2023-03-21 PROCEDURE — 99214 OFFICE O/P EST MOD 30 MIN: CPT | Performed by: FAMILY MEDICINE

## 2023-03-21 RX ORDER — TRIAMCINOLONE ACETONIDE 1 MG/G
CREAM TOPICAL 2 TIMES DAILY
Qty: 30 G | Refills: 0 | Status: SHIPPED | OUTPATIENT
Start: 2023-03-21 | End: 2024-06-26

## 2023-03-21 RX ORDER — VALACYCLOVIR HYDROCHLORIDE 1 G/1
1000 TABLET, FILM COATED ORAL 3 TIMES DAILY
Qty: 21 TABLET | Refills: 0 | Status: SHIPPED | OUTPATIENT
Start: 2023-03-21 | End: 2023-11-06

## 2023-03-21 RX ORDER — LORAZEPAM 0.5 MG/1
0.5 TABLET ORAL EVERY 8 HOURS PRN
Qty: 30 TABLET | Refills: 0 | Status: SHIPPED | OUTPATIENT
Start: 2023-03-21 | End: 2024-01-16
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
## 2023-03-25 ENCOUNTER — HEALTH MAINTENANCE LETTER (OUTPATIENT)
Age: 63
End: 2023-03-25

## 2023-04-13 DIAGNOSIS — G47.00 INSOMNIA, UNSPECIFIED TYPE: ICD-10-CM

## 2023-04-13 RX ORDER — AMOXICILLIN 500 MG/1
500 CAPSULE ORAL 2 TIMES DAILY
Qty: 4 CAPSULE | Refills: 1 | Status: CANCELLED | OUTPATIENT
Start: 2023-04-13

## 2023-04-13 RX ORDER — TRAZODONE HYDROCHLORIDE 100 MG/1
TABLET ORAL
Qty: 90 TABLET | Refills: 1 | Status: SHIPPED | OUTPATIENT
Start: 2023-04-13 | End: 2024-06-26

## 2023-04-13 NOTE — TELEPHONE ENCOUNTER
Patient called back and left a message on the CS line stating that she no longer needs this as the dentist confirmed this.

## 2023-04-13 NOTE — TELEPHONE ENCOUNTER
Please advise, pt called stating she needs a refill of her amoxicillin prior to her upcoming dental procedure. It looks like it was previous prescribed for a hip replacement in 2012. I do not believe these are needed any longer?    Nathalie Mendosa is requesting a refill of:    Pending Prescriptions:                       Disp   Refills    amoxicillin (AMOXIL) 500 MG capsule       4 caps*1            Sig: Take 1 capsule (500 mg) by mouth 2 times daily

## 2023-05-15 DIAGNOSIS — N95.1 SYMPTOMATIC MENOPAUSAL OR FEMALE CLIMACTERIC STATES: ICD-10-CM

## 2023-05-16 NOTE — TELEPHONE ENCOUNTER
Nathalie Mendosa is requesting a refill of:    Pending Prescriptions:                       Disp   Refills    estradiol (VIVELLE-DOT) 0.05 MG/24HR bi-w*24 pat*0            Sig: PLACE 1 PATCH ON THE SKIN TWICE A WEEK    Please close encounter if RX was sent. Thanks, Jory

## 2023-05-17 RX ORDER — ESTRADIOL 0.05 MG/D
PATCH, EXTENDED RELEASE TRANSDERMAL
Qty: 24 PATCH | Refills: 0 | Status: SHIPPED | OUTPATIENT
Start: 2023-05-17 | End: 2023-08-08

## 2023-07-18 ENCOUNTER — MYC MEDICAL ADVICE (OUTPATIENT)
Dept: FAMILY MEDICINE | Facility: CLINIC | Age: 63
End: 2023-07-18

## 2023-07-18 DIAGNOSIS — Z13.820 SCREENING FOR OSTEOPOROSIS: Primary | ICD-10-CM

## 2023-08-07 DIAGNOSIS — N95.1 SYMPTOMATIC MENOPAUSAL OR FEMALE CLIMACTERIC STATES: ICD-10-CM

## 2023-08-08 RX ORDER — ESTRADIOL 0.05 MG/D
PATCH, EXTENDED RELEASE TRANSDERMAL
Qty: 24 PATCH | Refills: 0 | Status: SHIPPED | OUTPATIENT
Start: 2023-08-08 | End: 2023-10-20

## 2023-08-08 NOTE — TELEPHONE ENCOUNTER
Nathalie Mendosa is requesting a refill of:    Pending Prescriptions:                       Disp   Refills    estradiol (VIVELLE-DOT) 0.05 MG/24HR bi-w*24 pat*0            Sig: PLACE 1 PATCH ON THE SKIN TWICE A WEEK    Pt last seen on 3/21/23

## 2023-08-24 ENCOUNTER — TRANSFERRED RECORDS (OUTPATIENT)
Dept: FAMILY MEDICINE | Facility: CLINIC | Age: 63
End: 2023-08-24

## 2023-10-20 DIAGNOSIS — N95.1 SYMPTOMATIC MENOPAUSAL OR FEMALE CLIMACTERIC STATES: ICD-10-CM

## 2023-10-20 NOTE — TELEPHONE ENCOUNTER
Patient called into the CS line asking if Dr. Lamb is willing to send in her prescription for  Pending Prescriptions:                       Disp   Refills    estradiol (VIVELLE-DOT) 0.05 MG/24HR bi-w*24 pat*0          She is scheduled for 11/6/26 but will run out by the time she is seen and [facility] can take up to a couple of weeks to send out the medication to her-Routing to Dr. Lamb for approval or denial of request.

## 2023-10-23 RX ORDER — ESTRADIOL 0.05 MG/D
PATCH, EXTENDED RELEASE TRANSDERMAL
Qty: 24 PATCH | Refills: 0 | Status: SHIPPED | OUTPATIENT
Start: 2023-10-23 | End: 2024-01-02

## 2023-11-06 ENCOUNTER — OFFICE VISIT (OUTPATIENT)
Dept: FAMILY MEDICINE | Facility: CLINIC | Age: 63
End: 2023-11-06

## 2023-11-06 VITALS
HEART RATE: 64 BPM | DIASTOLIC BLOOD PRESSURE: 80 MMHG | RESPIRATION RATE: 20 BRPM | TEMPERATURE: 97.5 F | HEIGHT: 63 IN | WEIGHT: 121 LBS | BODY MASS INDEX: 21.44 KG/M2 | SYSTOLIC BLOOD PRESSURE: 124 MMHG

## 2023-11-06 DIAGNOSIS — G47.00 INSOMNIA, UNSPECIFIED TYPE: ICD-10-CM

## 2023-11-06 DIAGNOSIS — Z23 NEED FOR VACCINATION: ICD-10-CM

## 2023-11-06 DIAGNOSIS — E78.2 MIXED HYPERLIPIDEMIA: Primary | ICD-10-CM

## 2023-11-06 DIAGNOSIS — F41.1 GAD (GENERALIZED ANXIETY DISORDER): ICD-10-CM

## 2023-11-06 DIAGNOSIS — Z12.11 SPECIAL SCREENING FOR MALIGNANT NEOPLASMS, COLON: ICD-10-CM

## 2023-11-06 DIAGNOSIS — N95.1 SYMPTOMATIC MENOPAUSAL OR FEMALE CLIMACTERIC STATES: ICD-10-CM

## 2023-11-06 DIAGNOSIS — Z11.59 SCREENING FOR VIRAL DISEASE: ICD-10-CM

## 2023-11-06 LAB
ALBUMIN SERPL-MCNC: 4.4 G/DL (ref 3.6–5.1)
ALBUMIN/GLOB SERPL: 1.5 {RATIO} (ref 1–2.5)
ALP SERPL-CCNC: 58 U/L (ref 33–130)
ALT 1742-6: 9 U/L (ref 0–32)
AST 1920-8: 12 U/L (ref 0–35)
BILIRUB SERPL-MCNC: 1.2 MG/DL (ref 0.2–1.2)
BUN SERPL-MCNC: 11 MG/DL (ref 7–25)
BUN/CREATININE RATIO: 11.7 (ref 6–32)
CALCIUM SERPL-MCNC: 10.2 MG/DL (ref 8.6–10.3)
CHLORIDE SERPLBLD-SCNC: 95.7 MMOL/L (ref 98–110)
CHOLEST SERPL-MCNC: 235 MG/DL (ref 0–199)
CHOLEST/HDLC SERPL: 4 {RATIO} (ref 0–5)
CO2 SERPL-SCNC: 30.7 MMOL/L (ref 20–32)
CREAT SERPL-MCNC: 0.94 MG/DL (ref 0.6–1.3)
GLOBULIN, CALCULATED - QUEST: 3 (ref 1.9–3.7)
GLUCOSE SERPL-MCNC: 94 MG/DL (ref 60–99)
HDLC SERPL-MCNC: 68 MG/DL (ref 40–150)
LDLC SERPL CALC-MCNC: 161 MG/DL (ref 0–130)
POTASSIUM SERPL-SCNC: 4.61 MMOL/L (ref 3.5–5.3)
PROT SERPL-MCNC: 7.4 G/DL (ref 6.1–8.1)
SODIUM SERPL-SCNC: 135.5 MMOL/L (ref 135–146)
TRIGL SERPL-MCNC: 122 MG/DL (ref 0–149)

## 2023-11-06 PROCEDURE — 99214 OFFICE O/P EST MOD 30 MIN: CPT | Mod: 25 | Performed by: FAMILY MEDICINE

## 2023-11-06 PROCEDURE — 90686 IIV4 VACC NO PRSV 0.5 ML IM: CPT | Performed by: FAMILY MEDICINE

## 2023-11-06 PROCEDURE — 36415 COLL VENOUS BLD VENIPUNCTURE: CPT | Performed by: FAMILY MEDICINE

## 2023-11-06 PROCEDURE — 87389 HIV-1 AG W/HIV-1&-2 AB AG IA: CPT | Mod: 90 | Performed by: FAMILY MEDICINE

## 2023-11-06 PROCEDURE — 80061 LIPID PANEL: CPT | Performed by: FAMILY MEDICINE

## 2023-11-06 PROCEDURE — 80053 COMPREHEN METABOLIC PANEL: CPT | Performed by: FAMILY MEDICINE

## 2023-11-06 PROCEDURE — 90471 IMMUNIZATION ADMIN: CPT | Performed by: FAMILY MEDICINE

## 2023-11-06 ASSESSMENT — PATIENT HEALTH QUESTIONNAIRE - PHQ9: SUM OF ALL RESPONSES TO PHQ QUESTIONS 1-9: 1

## 2023-11-06 NOTE — PROGRESS NOTES
Assessment & Plan     Mixed hyperlipidemia  Control uncertain, Continue to work on healthy diet and exercise, discussed healthy habits pending labs  - Lipid Panel ([facility])  - Comprehensive Metobolic Panel ([facility])  - VENOUS COLLECTION    ANGI (generalized anxiety disorder)  Stable control, using self calming techniques    Symptomatic menopausal or female climacteric states  Well controlled, pt accepts risks of estrogen replacement therapy and understands these well    Insomnia, unspecified type  Stable, still struggles from time to time    Need for vaccination    - FLU VAC PRESRV FREE QUAD SPLIT VIR 3+YRS IM    Special screening for malignant neoplasms, colon    - Colonoscopy Screening  Referral    Screening for viral disease  Per cdc recommendations  - HIV 1/2 Agn Blank 4th Gen w Reflex ([facility])  - VENOUS COLLECTION      Review of the result(s) of each unique test - labs  Ordering of each unique test  Prescription drug management         FUTURE APPOINTMENTS:       - Follow-up visit in 1 yr  Regular exercise    No follow-ups on file.    Sandoval Lamb MD  [facility]    Diane Zelaya is a 63 year old, presenting for the following health issues:  Recheck Medication    HPI       Hyperlipidemia Follow-Up    Are you regularly taking any medication or supplement to lower your cholesterol?   No  Are you having muscle aches or other side effects that you think could be caused by your cholesterol lowering medication?  No    Anxiety Follow-Up  How are you doing with your anxiety since your last visit? No change  Are you having other symptoms that might be associated with anxiety? No  Have you had a significant life event? No   Are you feeling depressed? No  Do you have any concerns with your use of alcohol or other drugs? No    Social History     Tobacco Use    Smoking status: Never    Smokeless tobacco: Never   Substance Use Topics    Alcohol use: Yes     Alcohol/week: 1.7 standard drinks of  alcohol     Types: 2 drink(s) per week     Comment: occasional    Drug use: No         3/15/2021     9:57 AM 9/2/2021     3:33 PM 10/3/2022    11:18 AM   ANGI-7 SCORE   Total Score 0 2 5         9/2/2021     3:33 PM 10/3/2022    11:18 AM 11/6/2023     8:23 AM   PHQ   PHQ-9 Total Score 2 1 1   Q9: Thoughts of better off dead/self-harm past 2 weeks Not at all Not at all Not at all         11/6/2023     8:23 AM   Last PHQ-9   1.  Little interest or pleasure in doing things 0   2.  Feeling down, depressed, or hopeless 0   3.  Trouble falling or staying asleep, or sleeping too much 1   4.  Feeling tired or having little energy 0   5.  Poor appetite or overeating 0   6.  Feeling bad about yourself 0   7.  Trouble concentrating 0   8.  Moving slowly or restless 0   Q9: Thoughts of better off dead/self-harm past 2 weeks 0   PHQ-9 Total Score 1   Difficulty at work, home, or with people Not difficult at all         10/3/2022    11:18 AM   ANGI-7    1. Feeling nervous, anxious, or on edge 1   2. Not being able to stop or control worrying 0   3. Worrying too much about different things 1   4. Trouble relaxing 1   5. Being so restless that it is hard to sit still 1   6. Becoming easily annoyed or irritable 0   7. Feeling afraid, as if something awful might happen 1   ANGI-7 Total Score 5   If you checked any problems, how difficult have they made it for you to do your work, take care of things at home, or get along with other people? Somewhat difficult     Insomnia- using trazodone on and off- sometimes makes her groggy    Hot flashes- stable, using estradiol    How many servings of fruits and vegetables do you eat daily?  2-3  On average, how many sweetened beverages do you drink each day (Examples: soda, juice, sweet tea, etc.  Do NOT count diet or artificially sweetened beverages)?   1  How many days per week do you exercise enough to make your heart beat faster? 4  How many minutes a day do you exercise enough to make your  "heart beat faster? 20 - 29  How many days per week do you miss taking your medication? 0        Review of Systems   Constitutional, HEENT, cardiovascular, pulmonary, gi and gu systems are negative, except as otherwise noted.      Objective    /80 (BP Location: Left arm, Patient Position: Chair, Cuff Size: Adult Regular)   Pulse 64   Temp 97.5  F (36.4  C) (Temporal)   Resp 20   Ht 1.594 m (5' 2.75\")   Wt 54.9 kg (121 lb)   BMI 21.61 kg/m    Body mass index is 21.61 kg/m .  Physical Exam   GENERAL: healthy, alert and no distress  NECK: no adenopathy, no asymmetry, masses, or scars and thyroid normal to palpation  RESP: lungs clear to auscultation - no rales, rhonchi or wheezes  CV: regular rate and rhythm, normal S1 S2, no S3 or S4, no murmur, click or rub, no peripheral edema and peripheral pulses strong  ABDOMEN: soft, nontender, no hepatosplenomegaly, no masses and bowel sounds normal  MS: no gross musculoskeletal defects noted, no edema  PSYCH: mentation appears normal, affect normal/bright    Orders Only on 10/26/2021   Component Date Value Ref Range Status    Cholesterol 10/26/2021 229 (A)  0 - 199 mg/dL Final    Triglycerides 10/26/2021 9  0 - 149 mg/dL Final    HDL Cholesterol 10/26/2021 72  40 - 150 mg/dL Final    LDL Cholesterol Direct 10/26/2021 138 (A)  0 - 130 mg/dL Final    Cholesterol/HDL Ratio 10/26/2021 3  0 - 5 Final    Carbon Dioxide 10/26/2021 30.5  20 - 32 mmol/L Final    Creatinine 10/26/2021 0.92  0.60 - 1.30 mg/dL Final    Glucose 10/26/2021 89  60 - 99 mg/dL Final    Sodium 10/26/2021 137.0  135 - 146 mmol/L Final    Potassium 10/26/2021 4.33  3.5 - 5.3 mmol/L Final    Chloride 10/26/2021 100.3  98 - 110 mmol/L Final    Protein Total 10/26/2021 6.9  6.1 - 8.1 g/dL Final    Albumin 10/26/2021 4.3  3.6 - 5.1 g/dL Final    Alkaline Phosphatase 10/26/2021 59  33 - 130 U/L Final    ALT 10/26/2021 8  0 - 32 U/L Final    AST 10/26/2021 14  0 - 35 U/L Final    Bilirubin Total 10/26/2021 " 1.4 (A)  0.2 - 1.2 mg/dL Final    Urea Nitrogen 10/26/2021 18  7 - 25 mg/dL Final    Calcium 10/26/2021 9.5  8.6 - 10.3 mg/dL Final    BUN/Creatinine Ratio 10/26/2021 19.6  6 - 22 Final    Globulin Calculated 10/26/2021 2.6  1.9 - 3.7 Final    A/G Ratio 10/26/2021 1.7  1 - 2.5 Final

## 2023-11-06 NOTE — NURSING NOTE
Nathalie Mendosa is here for fasting labs and also medication refill.    Questioned patient about current smoking habits.  Pt. has never smoked.  PULSE regular  My Chart: active  CLASSIFICATION OF OVERWEIGHT AND OBESITY BY BMI                        Obesity Class           BMI(kg/m2)  Underweight                                    < 18.5  Normal                                         18.5-24.9  Overweight                                     25.0-29.9  OBESITY                     I                  30.0-34.9                             II                 35.0-39.9  EXTREME OBESITY             III                >40                            Patient's  BMI Body mass index is 21.61 kg/m .  http://hin.nhlbi.nih.gov/menuplanner/menu.cgi  Pre-visit planning  Immunizations - up to date  Colonoscopy - is due and ordered today  Mammogram - is up to date  Asthma -   PHQ9 -  completed  ANGI-7 -      The patient has verbalized that it is ok to leave a detailed voice message on the patient's cell phone with results/recommendations from this visit.

## 2023-11-07 LAB — HIV 1/2 AGN ABY 4TH GEN WITH REFLEX: NORMAL

## 2024-01-02 ENCOUNTER — MYC REFILL (OUTPATIENT)
Dept: FAMILY MEDICINE | Facility: CLINIC | Age: 64
End: 2024-01-02

## 2024-01-02 DIAGNOSIS — N95.1 SYMPTOMATIC MENOPAUSAL OR FEMALE CLIMACTERIC STATES: ICD-10-CM

## 2024-01-03 RX ORDER — ESTRADIOL 0.05 MG/D
1 PATCH, EXTENDED RELEASE TRANSDERMAL
Qty: 24 PATCH | Refills: 1 | Status: SHIPPED | OUTPATIENT
Start: 2024-01-04 | End: 2024-06-18

## 2024-01-16 ENCOUNTER — MYC REFILL (OUTPATIENT)
Dept: FAMILY MEDICINE | Facility: CLINIC | Age: 64
End: 2024-01-16

## 2024-01-16 DIAGNOSIS — F41.1 GAD (GENERALIZED ANXIETY DISORDER): ICD-10-CM

## 2024-01-16 RX ORDER — LORAZEPAM 0.5 MG/1
0.5 TABLET ORAL EVERY 8 HOURS PRN
Qty: 30 TABLET | Refills: 0 | Status: SHIPPED | OUTPATIENT
Start: 2024-01-16 | End: 2024-06-26

## 2024-01-16 NOTE — TELEPHONE ENCOUNTER
Nathalie Mendosa is requesting a refill of:    Pending Prescriptions:                       Disp   Refills    LORazepam (ATIVAN) 0.5 MG tablet          30 tab*0            Sig: Take 1 tablet (0.5 mg) by mouth every 8 hours as           needed for anxiety    Pt last filled this on 3/21/23

## 2024-01-17 ENCOUNTER — MYC REFILL (OUTPATIENT)
Dept: FAMILY MEDICINE | Facility: CLINIC | Age: 64
End: 2024-01-17

## 2024-01-17 DIAGNOSIS — F41.1 GAD (GENERALIZED ANXIETY DISORDER): ICD-10-CM

## 2024-01-17 RX ORDER — LORAZEPAM 0.5 MG/1
0.5 TABLET ORAL EVERY 8 HOURS PRN
Qty: 30 TABLET | Refills: 0 | Status: CANCELLED | OUTPATIENT
Start: 2024-01-17

## 2024-01-24 NOTE — TELEPHONE ENCOUNTER
Pt last seen 03/21/23 it looks like all other meds were filled except for this one.    Nathalie Mendosa is requesting a refill of:    Pending Prescriptions:                       Disp   Refills    traZODone (DESYREL) 100 MG tablet [Pharma*90 tab*1            Sig: TAKE 1/2 TO 1 TABLET(50  MG) BY MOUTH AT           BEDTIME       24-Jan-2024 17:57

## 2024-03-16 ENCOUNTER — HEALTH MAINTENANCE LETTER (OUTPATIENT)
Age: 64
End: 2024-03-16

## 2024-06-18 ENCOUNTER — MYC REFILL (OUTPATIENT)
Dept: FAMILY MEDICINE | Facility: CLINIC | Age: 64
End: 2024-06-18

## 2024-06-18 DIAGNOSIS — N95.1 SYMPTOMATIC MENOPAUSAL OR FEMALE CLIMACTERIC STATES: ICD-10-CM

## 2024-06-19 RX ORDER — ESTRADIOL 0.05 MG/D
1 PATCH, EXTENDED RELEASE TRANSDERMAL
Qty: 24 PATCH | Refills: 0 | Status: SHIPPED | OUTPATIENT
Start: 2024-06-20 | End: 2024-09-08

## 2024-06-19 NOTE — TELEPHONE ENCOUNTER
Nathalie Mendosa is requesting a refill of:    Pending Prescriptions:                       Disp   Refills    estradiol (VIVELLE-DOT) 0.05 MG/24HR bi-w*24 pat*0            Sig: Place 1 patch onto the skin twice a week    Pt has OV for refills

## 2024-06-26 ENCOUNTER — OFFICE VISIT (OUTPATIENT)
Dept: FAMILY MEDICINE | Facility: CLINIC | Age: 64
End: 2024-06-26

## 2024-06-26 VITALS
TEMPERATURE: 97.8 F | WEIGHT: 122 LBS | SYSTOLIC BLOOD PRESSURE: 132 MMHG | OXYGEN SATURATION: 99 % | DIASTOLIC BLOOD PRESSURE: 82 MMHG | BODY MASS INDEX: 21.78 KG/M2 | HEART RATE: 74 BPM

## 2024-06-26 DIAGNOSIS — F41.1 GAD (GENERALIZED ANXIETY DISORDER): ICD-10-CM

## 2024-06-26 DIAGNOSIS — G47.00 INSOMNIA, UNSPECIFIED TYPE: ICD-10-CM

## 2024-06-26 DIAGNOSIS — E78.2 MIXED HYPERLIPIDEMIA: ICD-10-CM

## 2024-06-26 DIAGNOSIS — N95.1 SYMPTOMATIC MENOPAUSAL OR FEMALE CLIMACTERIC STATES: Primary | ICD-10-CM

## 2024-06-26 PROCEDURE — 99214 OFFICE O/P EST MOD 30 MIN: CPT | Performed by: FAMILY MEDICINE

## 2024-06-26 PROCEDURE — G2211 COMPLEX E/M VISIT ADD ON: HCPCS | Performed by: FAMILY MEDICINE

## 2024-06-26 RX ORDER — ESTRADIOL 0.05 MG/D
1 PATCH, EXTENDED RELEASE TRANSDERMAL
Qty: 24 PATCH | Refills: 3 | Status: CANCELLED | OUTPATIENT
Start: 2024-06-27

## 2024-06-26 RX ORDER — TRAZODONE HYDROCHLORIDE 100 MG/1
100 TABLET ORAL AT BEDTIME
Qty: 90 TABLET | Refills: 1 | Status: SHIPPED | OUTPATIENT
Start: 2024-06-26

## 2024-06-26 RX ORDER — LORAZEPAM 0.5 MG/1
0.5 TABLET ORAL EVERY 8 HOURS PRN
Qty: 30 TABLET | Refills: 0 | Status: SHIPPED | OUTPATIENT
Start: 2024-06-26

## 2024-06-26 ASSESSMENT — PATIENT HEALTH QUESTIONNAIRE - PHQ9
5. POOR APPETITE OR OVEREATING: NOT AT ALL
SUM OF ALL RESPONSES TO PHQ QUESTIONS 1-9: 1

## 2024-06-26 ASSESSMENT — ANXIETY QUESTIONNAIRES
2. NOT BEING ABLE TO STOP OR CONTROL WORRYING: NOT AT ALL
GAD7 TOTAL SCORE: 0
GAD7 TOTAL SCORE: 0
IF YOU CHECKED OFF ANY PROBLEMS ON THIS QUESTIONNAIRE, HOW DIFFICULT HAVE THESE PROBLEMS MADE IT FOR YOU TO DO YOUR WORK, TAKE CARE OF THINGS AT HOME, OR GET ALONG WITH OTHER PEOPLE: SOMEWHAT DIFFICULT
1. FEELING NERVOUS, ANXIOUS, OR ON EDGE: NOT AT ALL
7. FEELING AFRAID AS IF SOMETHING AWFUL MIGHT HAPPEN: NOT AT ALL
6. BECOMING EASILY ANNOYED OR IRRITABLE: NOT AT ALL
5. BEING SO RESTLESS THAT IT IS HARD TO SIT STILL: NOT AT ALL
3. WORRYING TOO MUCH ABOUT DIFFERENT THINGS: NOT AT ALL

## 2024-06-26 NOTE — PROGRESS NOTES
Assessment & Plan     Symptomatic menopausal or female climacteric states  Well controlled, continue current medications at current doses     Mixed hyperlipidemia-ascvd score 4.7%  Stable, Continue to work on healthy diet and exercise, discussed healthy habits     ANGI (generalized anxiety disorder)  Well controlled, again discussed need to minimize ativan use-now that she is 64 we agree to use #30 per year, I reviewed the risks, benefits, and possible side effects of the medication.  The patient had an opportunity to ask any questions regarding the treatment plan. The patient was encouraged to call my office if any problems.     Insomnia, unspecified type  Pt will restart trazodone, I reviewed the risks, benefits, and possible side effects of the medication.  The patient had an opportunity to ask any questions regarding the treatment plan. The patient was encouraged to call my office if any problems.               FUTURE APPOINTMENTS:       - Follow-up visit in 1 yr  Regular exercise    No follow-ups on file.    Diane Zelaay is a 64 year old, presenting for the following health issues:  Recheck Medication (Refill medications, non-fasting today )    HPI       Hyperlipidemia Follow-Up    Are you regularly taking any medication or supplement to lower your cholesterol?   No  Are you having muscle aches or other side effects that you think could be caused by your cholesterol lowering medication?  No    Anxiety   How are you doing with your anxiety since your last visit? No change  Are you having other symptoms that might be associated with anxiety? Yes:  sleep, worry  Have you had a significant life event? No   Are you feeling depressed? No  Do you have any concerns with your use of alcohol or other drugs? No    Social History     Tobacco Use    Smoking status: Never     Passive exposure: Never    Smokeless tobacco: Never   Substance Use Topics    Alcohol use: Yes     Alcohol/week: 1.7 standard drinks of alcohol      Types: 2 drink(s) per week     Comment: occasional    Drug use: No         3/15/2021     9:57 AM 9/2/2021     3:33 PM 10/3/2022    11:18 AM   ANGI-7 SCORE   Total Score 0 2 5         9/2/2021     3:33 PM 10/3/2022    11:18 AM 11/6/2023     8:23 AM   PHQ   PHQ-9 Total Score 2 1 1   Q9: Thoughts of better off dead/self-harm past 2 weeks Not at all Not at all Not at all     Mood screeners done and scanned  INsomnia- pt struggles to sleep as  snores, has used trazodone in the past but made her a little sleepy  How many servings of fruits and vegetables do you eat daily?  2-3  On average, how many sweetened beverages do you drink each day (Examples: soda, juice, sweet tea, etc.  Do NOT count diet or artificially sweetened beverages)?   1  How many days per week do you exercise enough to make your heart beat faster? 6  How many minutes a day do you exercise enough to make your heart beat faster? 30 - 60  How many days per week do you miss taking your medication? 0  Menopausal symptoms stable- has used HRT for years, helps a lot-now has to get generic due to Medicare      Review of Systems  Constitutional, HEENT, cardiovascular, pulmonary, gi and gu systems are negative, except as otherwise noted.      Objective    /82 (BP Location: Right arm, Patient Position: Sitting, Cuff Size: Adult Regular)   Pulse 74   Temp 97.8  F (36.6  C) (Temporal)   Wt 55.3 kg (122 lb)   SpO2 99%   BMI 21.78 kg/m    Body mass index is 21.78 kg/m .  Physical Exam   GENERAL: alert and no distress  NECK: no adenopathy, no asymmetry, masses, or scars  RESP: lungs clear to auscultation - no rales, rhonchi or wheezes  CV: regular rate and rhythm, normal S1 S2, no S3 or S4, no murmur, click or rub, no peripheral edema  ABDOMEN: soft, nontender, no hepatosplenomegaly, no masses and bowel sounds normal  MS: no gross musculoskeletal defects noted, no edema  PSYCH: mentation appears normal, affect normal/bright    Office Visit on  11/06/2023   Component Date Value Ref Range Status    Cholesterol 11/06/2023 235 (A)  0 - 199 mg/dL Final    Triglycerides 11/06/2023 122  0 - 149 mg/dL Final    HDL Cholesterol 11/06/2023 68  40 - 150 mg/dL Final    LDL Cholesterol Direct 11/06/2023 161 (A)  0 - 130 mg/dL Final    Cholesterol/HDL Ratio 11/06/2023 4  0 - 5 Final    Carbon Dioxide 11/06/2023 30.7  20 - 32 mmol/L Final    Creatinine 11/06/2023 0.94  0.60 - 1.30 mg/dL Final    Glucose 11/06/2023 94  60 - 99 mg/dL Final    Sodium 11/06/2023 135.5  135 - 146 mmol/L Final    Potassium 11/06/2023 4.61  3.5 - 5.3 mmol/L Final    Chloride 11/06/2023 95.7 (A)  98 - 110 mmol/L Final    Protein Total 11/06/2023 7.4  6.1 - 8.1 g/dL Final    Albumin 11/06/2023 4.4  3.6 - 5.1 g/dL Final    Alkaline Phosphatase 11/06/2023 58  33 - 130 U/L Final    ALT 11/06/2023 9  0 - 32 U/L Final    AST 11/06/2023 12  0 - 35 U/L Final    Bilirubin Total 11/06/2023 1.2  0.2 - 1.2 mg/dL Final    Urea Nitrogen 11/06/2023 11  7 - 25 mg/dL Final    Calcium 11/06/2023 10.2  8.6 - 10.3 mg/dL Final    BUN/Creatinine Ratio 11/06/2023 11.7  6 - 32 Final    Globulin Calculated 11/06/2023 3.0  1.9 - 3.7 Final    A/G Ratio 11/06/2023 1.5  1 - 2.5 Final    HIV 1/2 Agn Blank 4th Gen With Reflex 11/06/2023 NON-REACTIVE  NON-REACTIVE Final    Comment: HIV-1 antigen and HIV-1/HIV-2 antibodies were not  detected. There is no laboratory evidence of HIV  infection.     PLEASE NOTE: This information has been disclosed to  you from records whose confidentiality may be  protected by state law.  If your state requires such  protection, then the state law prohibits you from  making any further disclosure of the information  without the specific written consent of the person  to whom it pertains, or as otherwise permitted by law.  A general authorization for the release of medical or  other information is NOT sufficient for this purpose.      For additional information please refer  to  http://education.[employer]/faq/MSK501  (This link is being provided for informational/  educational purposes only.)        The performance of this assay has not been clinically  validated in patients less than 2 years old.                Signed Electronically by: Sandoval Lamb MD

## 2024-06-26 NOTE — NURSING NOTE
Chief Complaint   Patient presents with    Recheck Medication     Refill medications, non-fasting today      Pre-visit Screening:  Immunizations:  not up to date - tdap at pharmacy, prevnar 20 today  Colonoscopy:  is up to date  Mammogram: is up to date  Asthma Action Test/Plan:  NA  PHQ9:  Done today  GAD7:  Done today  Questioned patient about current smoking habits Pt. has never smoked.  Ok to leave detailed message on voice mail for today's visit only Yes, phone # [phone]

## 2024-07-22 ENCOUNTER — OFFICE VISIT (OUTPATIENT)
Dept: FAMILY MEDICINE | Facility: CLINIC | Age: 64
End: 2024-07-22

## 2024-07-22 VITALS
BODY MASS INDEX: 21.43 KG/M2 | TEMPERATURE: 98 F | HEART RATE: 77 BPM | SYSTOLIC BLOOD PRESSURE: 146 MMHG | OXYGEN SATURATION: 97 % | DIASTOLIC BLOOD PRESSURE: 90 MMHG | WEIGHT: 120 LBS

## 2024-07-22 DIAGNOSIS — J06.9 VIRAL URI: ICD-10-CM

## 2024-07-22 DIAGNOSIS — J02.9 SORE THROAT: Primary | ICD-10-CM

## 2024-07-22 LAB
% GRANULOCYTES: 54.6 %
HCT VFR BLD AUTO: 45 % (ref 35–47)
HEMOGLOBIN: 14.7 G/DL (ref 11.7–15.7)
LYMPHOCYTES NFR BLD AUTO: 35.3 %
MCH RBC QN AUTO: 30 PG (ref 26–33)
MCHC RBC AUTO-ENTMCNC: 32.7 G/DL (ref 31–36)
MCV RBC AUTO: 91.8 FL (ref 78–100)
MONOCYTES NFR BLD AUTO: 10.1 %
PLATELET COUNT - QUEST: 253 10^9/L (ref 150–375)
RBC # BLD AUTO: 4.9 10*12/L (ref 3.8–5.2)
STREP A: NEGATIVE
WBC # BLD AUTO: 11.1 10*9/L (ref 4–11)

## 2024-07-22 PROCEDURE — 85025 COMPLETE CBC W/AUTO DIFF WBC: CPT | Performed by: PHYSICIAN ASSISTANT

## 2024-07-22 PROCEDURE — 99213 OFFICE O/P EST LOW 20 MIN: CPT | Performed by: PHYSICIAN ASSISTANT

## 2024-07-22 PROCEDURE — 36415 COLL VENOUS BLD VENIPUNCTURE: CPT | Performed by: PHYSICIAN ASSISTANT

## 2024-07-22 PROCEDURE — 87651 STREP A DNA AMP PROBE: CPT | Performed by: PHYSICIAN ASSISTANT

## 2024-07-22 RX ORDER — TRAMADOL HYDROCHLORIDE 50 MG/1
50 TABLET ORAL EVERY 6 HOURS PRN
COMMUNITY
Start: 2024-07-08

## 2024-07-22 NOTE — NURSING NOTE
Chief Complaint   Patient presents with    Pharyngitis     Stretching of glands when opening mouth. Symptoms since last Wednesday, just came back from trip with kid coughing in front of her on the plane. Took covid test Saturday was negative. Also has had low grade fever.     Pre-visit Screening:  Immunizations:  up to date  Colonoscopy:  is up to date  Mammogram: is up to date  Asthma Action Test/Plan:  na  PHQ9:  na  GAD7:  na  Questioned patient about current smoking habits Pt. has never smoked.  Ok to leave detailed message on voice mail for today's visit only yes, phone # [phone] (home)

## 2024-07-22 NOTE — PROGRESS NOTES
Assessment & Plan     Sore throat    - Strep A (BFP)    Viral URI-no severe signs/symptoms at this time, negative Strep A reassuring. Will treat symptomatically and await improvement  -Rest, increase fluids, honey for cough suppression/sore throat  -Add Mucinex and Sudafed D for symptomatic relief  -Ibuprofen/Tylenol as needed for symptomatic relief  Seek emergency care for significant shortness of breath, chest pain, and/or fever >103F that cannot be controlled with antipyretics   Very unlikely to be infectious beyond day 7-8 of illness  Chloraseptic spray PRN  - HEMOGRAM PLATELET DIFF (BFP)  - VENOUS COLLECTION              Follow up as needed    No follow-ups on file.    Subjective   Nathalie is a 64 year old, presenting for the following health issues:  Pharyngitis (Stretching of glands when opening mouth. Symptoms since last Wednesday, just came back from trip with kid coughing in front of her on the plane. Took covid test Saturday was negative. Also has had low grade fever.)    HPI     Pt has concerns for sore throat. Got back from [place] 4 days ago, sore throat started 5 days ago along with low grade fever. Ongoing fatigue, malaise, headache, low appetite, throat pain (a little better now). Swollen lymph nodes on neck. Negative COVID test 2 days ago. No cough, runny nose.               Review of Systems  Constitutional, neuro, ENT, endocrine, pulmonary, cardiac, gastrointestinal, genitourinary, musculoskeletal, integument and psychiatric systems are negative, except as otherwise noted.      Objective    BP (!) 146/90 (BP Location: Right arm, Patient Position: Sitting, Cuff Size: Adult Regular)   Pulse 77   Temp 98  F (36.7  C) (Temporal)   Wt 54.4 kg (120 lb)   SpO2 97%   BMI 21.43 kg/m    Body mass index is 21.43 kg/m .  Physical Exam   GENERAL: alert and no distress  HENT: normal cephalic/atraumatic, ear canals and TM's normal, nose and mouth without ulcers or lesions, oral mucous membranes moist, and  oropharynx erythematous  NECK: no adenopathy, no asymmetry, masses, or scars  RESP: lungs clear to auscultation - no rales, rhonchi or wheezes  CV: regular rate and rhythm, normal S1 S2, no S3 or S4, no murmur, click or rub, no peripheral edema  ABDOMEN: soft, nontender, no hepatosplenomegaly, no masses and bowel sounds normal  MS: no gross musculoskeletal defects noted, no edema  NEURO: Normal strength and tone, mentation intact and speech normal  PSYCH: mentation appears normal, affect normal/bright    Results for orders placed or performed in visit on 07/22/24   Strep A (BFP)     Status: None   Result Value Ref Range    STREP A Negative Negative   HEMOGRAM PLATELET DIFF (BFP)     Status: Abnormal   Result Value Ref Range    WBC 11.1 (A) 4.0 - 11 10*9/L    RBC Count 4.90 3.8 - 5.2 10*12/L    Hemoglobin 14.7 11.7 - 15.7 g/dL    Hematocrit 45.0 35.0 - 47.0 %    MCV 91.8 78 - 100 fL    MCH 30.0 26 - 33 pg    MCHC 32.7 31 - 36 g/dL    Platelet Count 253 150 - 375 10^9/L    % Granulocytes 54.6 %    % Lymphocytes 35.3 %    % Monocytes 10.1 %           Signed Electronically by: Carlos Saldana PA-C

## 2024-09-08 ENCOUNTER — MYC REFILL (OUTPATIENT)
Dept: FAMILY MEDICINE | Facility: CLINIC | Age: 64
End: 2024-09-08

## 2024-09-08 DIAGNOSIS — N95.1 SYMPTOMATIC MENOPAUSAL OR FEMALE CLIMACTERIC STATES: ICD-10-CM

## 2024-09-09 RX ORDER — ESTRADIOL 0.05 MG/D
1 PATCH, EXTENDED RELEASE TRANSDERMAL
COMMUNITY
Start: 2024-09-09

## 2024-09-09 RX ORDER — ESTRADIOL 0.05 MG/D
1 PATCH, EXTENDED RELEASE TRANSDERMAL
Qty: 24 PATCH | Refills: 2 | Status: SHIPPED | OUTPATIENT
Start: 2024-09-09

## 2024-09-09 NOTE — TELEPHONE ENCOUNTER
Pt last seen 06/26/24 advised to return in 1 year.    Nathalie Mendosa is requesting a refill of:    Pending Prescriptions:                       Disp   Refills    estradiol (VIVELLE-DOT) 0.05 MG/24HR bi-w*24 pat*2            Sig: Place 1 patch onto the skin twice a week.

## 2024-11-05 NOTE — NURSING NOTE
Cardiology Clinic Note  Reason for Visit: Pre-operative evaluation  11/5/2024    HPI:   Radha Chiang is a 66 y.o. female with a history of carotid artery disease, Lupus, Crohn's, osteoarthritis, and asthma (?questionable) presenting for pre-operative evaluation.     Patient has a history of osteoarthritis and is scheduled for upcoming left TKA on 11/25/2024, plan for spinal anesthesia.  She was referred for preoperative clearance after noted to concerning finding on ECG.  ECG reviewed and notable for sinus bradycardia with an incomplete right bundle branch block; no concerning findings.  She denies syncope or palpitations.    Patient denies a history of cardiac disease including coronary artery disease or heart failure.  She does have a history of carotid artery stenosis and is followed by vascular surgery with plan for follow-up ultrasound in the near future.    She reports a 2 year history of dyspnea both with exertion and at rest.  Symptoms come and go, with occasional wheezing.  She has an albuterol inhaler and reports symptoms improve when used.  She was seen by pulmonology about a year ago who reported that PFTs were not consistent with asthma which she was previously diagnosed with.  She denies edema, orthopnea, PND.    She reports 2-3 episodes of chest pain over the past 2 weeks.  Characterized as a tightness and lasts for a few seconds before resolving without intervention.  Symptoms occur randomly, not with exertion.  Exercise stress echo 1/2024 negative. She does have a history of gastric reflux.    ROS:    Constitution: Negative for fever, chills, weight loss or gain.  HENT: Negative for sore throat, rhinorrhea, or headache.  Eyes: Negative for blurred or double vision.   Cardiovascular: See above  Pulmonary: See above  Gastrointestinal: Negative for abdominal pain, n/v/d/c  : Negative for dysuria.   Neurological: Negative for focal weakness or sensory changes.  PMH:     Past Medical History:  Nathalie is here for med check  Pre-visit Screening:  Immunizations:  up to date flu shot today  Colonoscopy:  is up to date  Mammogram: is up to date  Asthma Action Test/Plan:  NA  PHQ9:  Done today  GAD7:  Done today  Questioned patient about current smoking habits Pt. has never smoked.  Ok to leave detailed message on voice mail for today's visit only Yes, phone # [phone]         Diagnosis Date    Acid reflux     Anemia 2years ago    Anxiety     Arthritis     Asthma, chronic 4/10/2013    Crohn's disease 1998    Depression     Dry eyes     Fracture of forearm, proximal, open     screws & plate 6/26/13 in [facility]    History of papilledema 11/10/11    Hyperlipidemia 5/21/2018    Lupus     Neuromuscular disorder 12/2009    Neuropathy    Osteoporosis     Stroke 1/29/2013    Thyroid disease      Past Surgical History:   Procedure Laterality Date    ADENOIDECTOMY  1962    BRAIN SURGERY  01/18/2012    Brain Biopsy    BREAST CYST ASPIRATION Left     COLONOSCOPY  2017    [facility] Dr. WILLIS Summers; pan-diverticulosis, rec repeat 3-5 years    COLONOSCOPY N/A 2/9/2023    Procedure: COLONOSCOPY;  Surgeon: Reina Crabtree MD;  Location: [facility];  Service: Colon and Rectal;  Laterality: N/A;    COLONOSCOPY N/A 7/24/2024    Procedure: COLONOSCOPY;  Surgeon: Reina Crabtree MD;  Location: [facility] (4TH FLR);  Service: Endoscopy;  Laterality: N/A;  Ref By:  Dr. Crabtree   05/07 r/s updated instructions sent via portal-dw  7/17-pre call complete-tb  7/23-pt notified of earlier arrival time (07:55am)-[facility]    ESOPHAGOGASTRODUODENOSCOPY N/A 4/27/2023    Procedure: ESOPHAGOGASTRODUODENOSCOPY (EGD);  Surgeon: Martir Braxton MD;  Location: [facility] (4TH FLR);  Service: Endoscopy;  Laterality: N/A;  4/21 Pre-call attempted, pt had phone trouble kls    ESOPHAGOGASTRODUODENOSCOPY N/A 7/21/2023    Procedure: EGD (ESOPHAGOGASTRODUODENOSCOPY);  Surgeon: Martir Braxton MD;  Location: [facility] (4TH FLR);  Service: Endoscopy;  Laterality: N/A;  12wks to check healing, pt request Dr. Braxton, [facility] portal. Referral: REYNA MCKENZIE, Proc order tele enc 5/29/23-LW    INCISION AND DRAINAGE PERIRECTAL ABSCESS  1998    abscess removal    ORIF FOREARM FRACTURE Left 06/23/2013    proximal ulnar and radius     Allergies:     Review of patient's allergies indicates:   Allergen Reactions    Sulfamethoxazole-trimethoprim Nausea And Vomiting,  Anaphylaxis, Other (See Comments), Hives, Rash and Swelling     Nausea, ? rash    Cannot remember      Nausea, ? rash Nausea, ? rash      Cannot remember  Nausea, ? rash Nausea, ? rash  Nausea, ? rash     Medications:     Current Outpatient Medications on File Prior to Visit   Medication Sig Dispense Refill    alpha lipoic acid 300 mg Cap       aspirin (ECOTRIN) 81 MG EC tablet Take 1 tablet (81 mg total) by mouth once daily.      calcium carbonate/vitamin D3 (CALTRATE 600 + D ORAL) Take 2 tablets by mouth once daily.      coenzyme Q10 100 mg capsule       ergocalciferol (ERGOCALCIFEROL) 50,000 unit Cap Take 1 capsule (50,000 Units total) by mouth every 7 days. 12 capsule 3    esomeprazole (NEXIUM) 20 MG capsule TAKE 2 CAPSULES BY MOUTH BEFORE BREAKFAST 180 capsule 1    fish oil-omega-3 fatty acids 300-1,000 mg capsule Take 1 g by mouth 2 (two) times daily.      hydroxychloroquine (PLAQUENIL) 200 mg tablet Take 400mg daily alternating with 200mg daily Brand name only medically necessary 135 tablet 2    levothyroxine (SYNTHROID) 112 MCG tablet TAKE 1 TABLET(112 MCG) BY MOUTH EVERY DAY 90 tablet 1    magnesium oxide (MAG-OX) 200 MG tablet       meloxicam (MOBIC) 15 MG tablet Take 1 tablet (15 mg total) by mouth once daily. 30 tablet 2    multivitamin (THERAGRAN) per tablet Take 1 tablet by mouth before breakfast.      rosuvastatin (CRESTOR) 20 MG tablet TAKE 1 TABLET(20 MG) BY MOUTH EVERY DAY 90 tablet 2    traMADoL (ULTRAM) 50 mg tablet Take 1 tablet (50 mg total) by mouth every 6 (six) hours as needed for Pain. (Patient not taking: Reported on 11/5/2024) 20 tablet 0    VENTOLIN HFA 90 mcg/actuation inhaler Inhale 2 puffs into the lungs every 6 (six) hours as needed for Wheezing or Shortness of Breath (cough). Rescue 18 g 11     No current facility-administered medications on file prior to visit.     Social History:     Social History     Tobacco Use    Smoking status: Former     Current packs/day: 0.00     Average  "packs/day: 0.5 packs/day for 15.0 years (7.5 ttl pk-yrs)     Types: Cigarettes     Start date: 1969     Quit date: 1984     Years since quittin.3    Smokeless tobacco: Never    Tobacco comments:     Quit    Substance Use Topics    Alcohol use: Yes     Comment: less than one per week     Family History:     Family History   Problem Relation Name Age of Onset    Cancer Mother  64        small cell lung cancer; smoker    Heart disease Father  64        acute MI; 3v CABG    Sjogren's syndrome Sister      Allergies Sister      Cancer Maternal Grandmother      COPD Maternal Grandfather      Cancer Paternal Grandmother      Stroke Paternal Grandmother      Ovarian cancer Paternal Grandmother      Heart disease Paternal Grandfather      Intellectual disability Brother      Hypertension Sister      Lupus Paternal Uncle      Angioedema Neg Hx      Asthma Neg Hx      Atopy Neg Hx      Eczema Neg Hx      Immunodeficiency Neg Hx      Rhinitis Neg Hx      Urticaria Neg Hx       Physical Exam:   /60 (Patient Position: Sitting)   Pulse 63   Ht 4' 11" (1.499 m)   Wt 77.3 kg (170 lb 6.7 oz)   SpO2 97%   BMI 34.42 kg/m²    Wt Readings from Last 4 Encounters:   24 77.3 kg (170 lb 6.7 oz)   24 76.5 kg (168 lb 12.2 oz)   10/29/24 75.2 kg (165 lb 12.6 oz)   10/17/24 75.3 kg (166 lb 0.1 oz)       Constitutional: WNWD, in NAD, conversant  HEENT: Sclera anicteric, EOMI  Neck: No JVD, no LAD, supple  CV: RRR, S1 and S2 normal, no additional heart sounds or murmurs. Pulses 2+ and equal bilaterally in radial arteries bilaterally  Pulm: Clear to auscultation bilaterally, no w/r/r  GI: Abdomen soft, NTND  Extremities: Grossly normal, skin is warm, no edema noted  Skin: Warm, well perfused   Psych: AAOx3, appropriate affect  Neuro: CNII-XII grossly intact, no focal deficits    Labs:     Lab Results   Component Value Date     10/16/2024    K 4.4 10/16/2024     10/16/2024    CO2 24 10/16/2024    " "BUN 16 10/16/2024    CREATININE 0.9 10/16/2024    ANIONGAP 9 10/16/2024     Lab Results   Component Value Date    HGBA1C 5.3 06/07/2024     No results found for: "BNP", "BNPTRIAGEBLO" Lab Results   Component Value Date    WBC 5.66 10/16/2024    HGB 14.0 10/16/2024    HCT 43.5 10/16/2024     10/16/2024    GRAN 3.6 10/16/2024    GRAN 63.0 10/16/2024     Lab Results   Component Value Date    CHOL 112 (L) 01/23/2023    HDL 57 01/23/2023    LDLCALC 46.8 (L) 01/23/2023    TRIG 41 01/23/2023        No results found for: "EF"    Imaging:     EKG 10/22/24:  Sinus bradycardia, incomplete RBBB    Exercise Stress Echo 1/8/24:    Stress Protocol: The patient exercised for 5 minutes 43 seconds on a high ramp protocol, corresponding to a functional capacity of 9 METS, achieving a peak heart rate of 133 bpm, which is 90 % of the age predicted maximum heart rate. Their exercise capacity was normal. The patient reported no symptoms during the stress test. The test was stopped because the patient experienced leg fatigue and shortness of breath.    ECG Conclusion: The ECG portion of the study is negative for ischemia.    Right Ventricle: Normal right ventricular cavity size. Wall thickness is normal. Right ventricle wall motion  is normal. Systolic function is normal.    Left Ventricle: The left ventricle is normal in size. Normal wall thickness. Normal wall motion. There is normal systolic function. There is normal diastolic function.    Mitral Valve: There is mild regurgitation.    Pulmonary Artery: The estimated pulmonary artery systolic pressure is 24 mmHg.    Post-stress Impression: The study is negative with no echocardiographic evidence of stress induced ischemia.    Assessment and Plan:   Radha Chiang is a 66 y.o. female with a history of carotid artery disease, Lupus, Crohn's, osteoporosis, arthritis, and asthma (?questionable) presenting for pre-operative evaluation.     Pre-operative " evaluation  Osteoarthritis:  Patient scheduled for L TKA this month. Plan for spinal anesthesia. She has a h/o carotid a stenosis, followed by vascular surgery, stable. No h/o CAD or HF. Exercise stress echo negative 1/2024. Lipids well controlled. No DM.   - RCRI Class I risk, 3.9% 30d risk of death, MI, or cardiac arrest  - Patient is not high risk for procedure from a cardiac standpoint     B/l carotid artery stenosis:  Stable, f/w Vascular surgery, planning for f/u carotid u/s. LDL 47, 1/2023.   - Continue Asa, statin    Incomplete RBBB:  Noted on ECG 10/22/24. Not a concerning finding in the absence of additional conduction disease.     Atypical chest pain  Not with exertion. Lasts for a few seconds before resolving without intervention. Exercise stress echo 1/2024 negative. Low concern for cardiac etiology. Potentially reflux as has known hiatal hernia.   - CTM    Dyspnea, with rest and exertion:  Ongoing x2 years. Associated with wheezes and improves with albuterol. Exercise stress echo 1/2024 negative. Concern for pulmonary etiology.   - Recommend PCP referral to Pulmonology for evaluation       Discussed mediterranean diet.  Discussed exercise therapy based on 150-min per week AHA recommendations for moderate intensity exercise/75 min for high-intensity exercise.      Follow up if symptoms worsen or fail to improve.    Signed:  DAVID Wu M.D.  Cardiology Fellow, PGY-4  [facility]  11/5/2024 3:08 PM

## 2024-11-11 ENCOUNTER — OFFICE VISIT (OUTPATIENT)
Dept: FAMILY MEDICINE | Facility: CLINIC | Age: 64
End: 2024-11-11

## 2024-11-11 VITALS
SYSTOLIC BLOOD PRESSURE: 138 MMHG | HEART RATE: 75 BPM | WEIGHT: 118.4 LBS | BODY MASS INDEX: 21.79 KG/M2 | HEIGHT: 62 IN | DIASTOLIC BLOOD PRESSURE: 88 MMHG | TEMPERATURE: 97 F | OXYGEN SATURATION: 98 %

## 2024-11-11 DIAGNOSIS — F41.1 GAD (GENERALIZED ANXIETY DISORDER): Primary | ICD-10-CM

## 2024-11-11 DIAGNOSIS — Z23 NEED FOR VACCINATION: ICD-10-CM

## 2024-11-11 DIAGNOSIS — G47.00 INSOMNIA, UNSPECIFIED TYPE: ICD-10-CM

## 2024-11-11 DIAGNOSIS — N95.1 SYMPTOMATIC MENOPAUSAL OR FEMALE CLIMACTERIC STATES: ICD-10-CM

## 2024-11-11 DIAGNOSIS — E78.2 MIXED HYPERLIPIDEMIA: ICD-10-CM

## 2024-11-11 DIAGNOSIS — Z12.11 SPECIAL SCREENING FOR MALIGNANT NEOPLASMS, COLON: ICD-10-CM

## 2024-11-11 DIAGNOSIS — Z00.00 ENCOUNTER FOR ANNUAL WELLNESS EXAM IN MEDICARE PATIENT: ICD-10-CM

## 2024-11-11 LAB
ALBUMIN SERPL-MCNC: 4.4 G/DL (ref 3.6–5.1)
ALP SERPL-CCNC: 61 U/L (ref 33–130)
ALT 1742-6: 10 U/L (ref 0–32)
AST 1920-8: 14 U/L (ref 0–35)
BILIRUB SERPL-MCNC: 1.1 MG/DL (ref 0.2–1.2)
BUN SERPL-MCNC: 13 MG/DL (ref 7–25)
BUN/CREATININE RATIO: 13 (ref 6–32)
CALCIUM SERPL-MCNC: 10 MG/DL (ref 8.6–10.3)
CHLORIDE SERPLBLD-SCNC: 101.5 MMOL/L (ref 98–110)
CHOLEST SERPL-MCNC: 236 MG/DL (ref 0–199)
CHOLEST/HDLC SERPL: 4 {RATIO} (ref 0–5)
CO2 SERPL-SCNC: 29 MMOL/L (ref 20–32)
CREAT SERPL-MCNC: 1.01 MG/DL (ref 0.6–1.3)
GLUCOSE SERPL-MCNC: 101 MG/DL (ref 60–99)
HDLC SERPL-MCNC: 58 MG/DL (ref 40–150)
LDLC SERPL CALC-MCNC: 153 MG/DL (ref 0–129)
POTASSIUM SERPL-SCNC: 4.25 MMOL/L (ref 3.5–5.3)
PROT SERPL-MCNC: 7.2 G/DL (ref 6.1–8.1)
SODIUM SERPL-SCNC: 139.1 MMOL/L (ref 135–146)
TRIGL SERPL-MCNC: 124 MG/DL (ref 0–149)

## 2024-11-11 PROCEDURE — G0008 ADMIN INFLUENZA VIRUS VAC: HCPCS | Performed by: FAMILY MEDICINE

## 2024-11-11 PROCEDURE — 99214 OFFICE O/P EST MOD 30 MIN: CPT | Mod: 25 | Performed by: FAMILY MEDICINE

## 2024-11-11 PROCEDURE — 90662 IIV NO PRSV INCREASED AG IM: CPT | Performed by: FAMILY MEDICINE

## 2024-11-11 PROCEDURE — 80061 LIPID PANEL: CPT | Performed by: FAMILY MEDICINE

## 2024-11-11 PROCEDURE — 36415 COLL VENOUS BLD VENIPUNCTURE: CPT | Performed by: FAMILY MEDICINE

## 2024-11-11 PROCEDURE — G0402 INITIAL PREVENTIVE EXAM: HCPCS | Performed by: FAMILY MEDICINE

## 2024-11-11 PROCEDURE — 80053 COMPREHEN METABOLIC PANEL: CPT | Performed by: FAMILY MEDICINE

## 2024-11-11 NOTE — NURSING NOTE
Chief Complaint   Patient presents with    Recheck Medication     Fasting labs    Imm/Inj     Flu shot    Medicare Visit     Welcome to medicare visit     Pre-visit Screening:  Immunizations:  not up to date - will do flu today  Colonoscopy:  is due and ordered today  Mammogram: is up to date  Asthma Action Test/Plan:    PHQ9:    GAD7:    Questioned patient about current smoking habits Pt. has never smoked.  Ok to leave detailed message on voice mail for today's visit only yes, phone # [phone] (home)

## 2024-11-11 NOTE — PROGRESS NOTES
Nathalie Mendosa is a 64 year old female who presents for Medicare Initial Preventive Physical Examination.    Complete Medical and Social history reviewed with patient, outlined below.    Patient Active Problem List   Diagnosis    OA (osteoarthritis)    Symptomatic menopausal or female climacteric states    Essential hypertension, benign    Status post hip replacement    Anxiety state    Pain in joint, pelvic region and thigh    Major depressive disorder    ACP (advance care planning)    Abnormal glucose    ANGI (generalized anxiety disorder)    Insomnia, unspecified type    Mixed hyperlipidemia       Past Medical History:   Diagnosis Date    Endometriosis     FAMILY HX ENDO/METAB DIS NEC 10/20/2005    Mother with high chol       Past Surgical History:   Procedure Laterality Date    C MAMMOGRAM, SCREENING  2008     LAPAROSCOPIC MYOMECTOMY, 1 - 4 INTRAMURAL MYOMAS =<250 GM      HCL PAP SMEAR  2008    GYN    SEXA BONE DENS PERIPHERAL  2007    TEST NOT FOUND      In vitro with # proceedures    [facility] EYE EXAM ESTABLISHED PT  2009    [facility] REVISE TOTAL HIP REPLACEMENT  7/2012    Dr Reid    [facility] TOTAL ABDOM HYSTERECTOMY      Hysterectomy, Total Abdominal  BSO    [facility] TOTAL HIP ARTHROPLASTY  9/9/2011    Hip Replacement, Total  L       Family History   Problem Relation Age of Onset    C.A.D. Mother     Psychotic Disorder Father         anxiety/depression    Breast Cancer Maternal Aunt     Cancer - colorectal Paternal Grandmother     Diabetes No family hx of        Social History     Tobacco Use    Smoking status: Never     Passive exposure: Never    Smokeless tobacco: Never   Substance Use Topics    Alcohol use: Yes     Alcohol/week: 1.7 standard drinks of alcohol     Types: 2 drink(s) per week     Comment: occasional       Diet: regular, low salt/low fat  Physical Activity: walks everyday  Depression Screen:    Over the past 2 weeks, patient has felt down, depressed, or hopeless:  No    Over the past 2 weeks, patient has  "felt little interest or pleasure in doing things: No  Functional ability/Safety screen:  Up and go test longer than 30 seconds?  Yes  Patient needs assistance with: nothing, fully independent  Patient's home has the following possible safety concerns: none identified  Patient has concerns about her hearing:  No    Physical Exam:  /88 (BP Location: Right arm, Patient Position: Sitting, Cuff Size: Adult Regular)   Pulse 75   Temp 97  F (36.1  C) (Temporal)   Ht 1.575 m (5' 2\")   Wt 53.7 kg (118 lb 6.4 oz)   SpO2 98%   BMI 21.66 kg/m     Body mass index is 21.66 kg/m .   Visual Acuity:  Right Eye: 20/16   Left Eye: 20/16     Staff Signature Hailey Wooten CNA      Assessment & Plan     ANGI (generalized anxiety disorder)  Well controlled, Continue to work on healthy diet and exercise, discussed healthy habits     Insomnia, unspecified type  Well controlled, continue current medications at current doses     Symptomatic menopausal or female climacteric states  Well controled, continue current medications at current doses     Mixed hyperlipidemia-ascvd score 4.7%  recheck    Encounter for annual wellness exam in Medicare patient  discussed preventitive healthcare Continue to work on healthy diet and exercise, discussed healthy habits   Personalized Prevention Plan  You are due for the preventive services outlined below.  Your care team is available to assist you in scheduling these services.  If you have already completed any of these items, please share that information with your care team to update in your medical record.  Health Maintenance   Topic Date Due    COLORECTAL CANCER SCREENING  09/23/2019    MEDICARE ANNUAL WELLNESS VISIT  01/29/2024    Pneumococcal Vaccine: 65+ Years (2 of 2 - PCV) 01/29/2024    INFLUENZA VACCINE (1) 09/01/2024    BMP  11/06/2024    LIPID  11/06/2024    DTAP/TDAP/TD IMMUNIZATION (2 - Td or Tdap) 06/26/2025 (Originally 8/25/2021)    COVID-19 Vaccine (4 - 2024-25 season) 11/11/2025 " (Originally 9/1/2024)    PHQ-9  12/26/2024    FALL RISK ASSESSMENT  06/26/2025    MAMMO SCREENING  11/04/2026    GLUCOSE  11/06/2026    ADVANCE CARE PLANNING  03/22/2027    RSV VACCINE (1 - 1-dose 75+ series) 01/29/2034    DEXA  08/04/2038    HEPATITIS C SCREENING  Completed    HIV SCREENING  Completed    DEPRESSION ACTION PLAN  Completed    ZOSTER IMMUNIZATION  Completed    HPV IMMUNIZATION  Aged Out    MENINGITIS IMMUNIZATION  Aged Out    RSV MONOCLONAL ANTIBODY  Aged Out        Special screening for malignant neoplasms, colon      Need for vaccination  flu              FUTURE APPOINTMENTS:       - Follow-up visit in 1 yr  Regular exercise    No follow-ups on file.    Preventive Care Visit  [facility]  Sandoval Lamb MD, Family Medicine  Nov 11, 2024      Signed Electronically by: Sandoval Lamb MD      Assessment & Plan                 Regular exercise    No follow-ups on file.    Diane Zelaya is a 64 year old, presenting for the following health issues:  Recheck Medication (Fasting labs), Imm/Inj (Flu shot), and Medicare Visit (Welcome to medicare visit)    HPI       Hyperlipidemia Follow-Up    Are you regularly taking any medication or supplement to lower your cholesterol?   No  Are you having muscle aches or other side effects that you think could be caused by your cholesterol lowering medication?  No    Anxiety   How are you doing with your anxiety since your last visit? No change  Are you having other symptoms that might be associated with anxiety? No  Have you had a significant life event? No   Are you feeling depressed? No  Do you have any concerns with your use of alcohol or other drugs? No  INsomnia - using trazodone nightly, worling well    Social History     Tobacco Use    Smoking status: Never     Passive exposure: Never    Smokeless tobacco: Never   Substance Use Topics    Alcohol use: Yes     Alcohol/week: 1.7 standard drinks of alcohol     Types: 2  drink(s) per week     Comment: occasional    Drug use: No         9/2/2021     3:33 PM 10/3/2022    11:18 AM 6/26/2024     2:11 PM   ANGI-7 SCORE   Total Score 2 5 0         10/3/2022    11:18 AM 11/6/2023     8:23 AM 6/26/2024     2:11 PM   PHQ   PHQ-9 Total Score 1 1 1   Q9: Thoughts of better off dead/self-harm past 2 weeks Not at all Not at all Not at all         6/26/2024     2:11 PM   Last PHQ-9   1.  Little interest or pleasure in doing things 0   2.  Feeling down, depressed, or hopeless 0   3.  Trouble falling or staying asleep, or sleeping too much 1   4.  Feeling tired or having little energy 0   5.  Poor appetite or overeating 0   6.  Feeling bad about yourself 0   7.  Trouble concentrating 0   8.  Moving slowly or restless 0   Q9: Thoughts of better off dead/self-harm past 2 weeks 0   PHQ-9 Total Score 1   Difficulty at work, home, or with people Somewhat difficult         6/26/2024     2:11 PM   ANGI-7    1. Feeling nervous, anxious, or on edge 0   2. Not being able to stop or control worrying 0   3. Worrying too much about different things 0   4. Trouble relaxing 0   5. Being so restless that it is hard to sit still 0   6. Becoming easily annoyed or irritable 0   7. Feeling afraid, as if something awful might happen 0   ANGI-7 Total Score 0   If you checked any problems, how difficult have they made it for you to do your work, take care of things at home, or get along with other people? Somewhat difficult     How many servings of fruits and vegetables do you eat daily?  2-3  On average, how many sweetened beverages do you drink each day (Examples: soda, juice, sweet tea, etc.  Do NOT count diet or artificially sweetened beverages)?   1  How many days per week do you exercise enough to make your heart beat faster? 6  How many minutes a day do you exercise enough to make your heart beat faster? 30 - 60  How many days per week do you miss taking your medication? 0        Review of Systems  Constitutional,  "HEENT, cardiovascular, pulmonary, gi and gu systems are negative, except as otherwise noted.      Objective    /88 (BP Location: Right arm, Patient Position: Sitting, Cuff Size: Adult Regular)   Pulse 75   Temp 97  F (36.1  C) (Temporal)   Ht 1.575 m (5' 2\")   Wt 53.7 kg (118 lb 6.4 oz)   SpO2 98%   BMI 21.66 kg/m    Body mass index is 21.66 kg/m .  Physical Exam   GENERAL: alert and no distress  EYES: Eyes grossly normal to inspection, PERRL and conjunctivae and sclerae normal  HENT: ear canals and TM's normal, nose and mouth without ulcers or lesions  NECK: no adenopathy, no asymmetry, masses, or scars  RESP: lungs clear to auscultation - no rales, rhonchi or wheezes  CV: regular rate and rhythm, normal S1 S2, no S3 or S4, no murmur, click or rub, no peripheral edema  ABDOMEN: soft, nontender, no hepatosplenomegaly, no masses and bowel sounds normal  MS: no gross musculoskeletal defects noted, no edema  PSYCH: mentation appears normal, affect normal/bright    Office Visit on 07/22/2024   Component Date Value Ref Range Status    STREP A 07/22/2024 Negative  Negative Final    WBC 07/22/2024 11.1 (A)  4.0 - 11 10*9/L Final    RBC Count 07/22/2024 4.90  3.8 - 5.2 10*12/L Final    Hemoglobin 07/22/2024 14.7  11.7 - 15.7 g/dL Final    Hematocrit 07/22/2024 45.0  35.0 - 47.0 % Final    MCV 07/22/2024 91.8  78 - 100 fL Final    MCH 07/22/2024 30.0  26 - 33 pg Final    MCHC 07/22/2024 32.7  31 - 36 g/dL Final    Platelet Count 07/22/2024 253  150 - 375 10^9/L Final    % Granulocytes 07/22/2024 54.6  % Final    % Lymphocytes 07/22/2024 35.3  % Final    % Monocytes 07/22/2024 10.1  % Final           Signed Electronically by: Sandoval Lamb MD      "

## 2025-01-01 PROCEDURE — 93005 ELECTROCARDIOGRAM TRACING: CPT

## 2025-01-01 PROCEDURE — 99285 EMERGENCY DEPT VISIT HI MDM: CPT | Mod: 25

## 2025-01-01 ASSESSMENT — COLUMBIA-SUICIDE SEVERITY RATING SCALE - C-SSRS
1. IN THE PAST MONTH, HAVE YOU WISHED YOU WERE DEAD OR WISHED YOU COULD GO TO SLEEP AND NOT WAKE UP?: NO
2. HAVE YOU ACTUALLY HAD ANY THOUGHTS OF KILLING YOURSELF IN THE PAST MONTH?: NO
6. HAVE YOU EVER DONE ANYTHING, STARTED TO DO ANYTHING, OR PREPARED TO DO ANYTHING TO END YOUR LIFE?: NO

## 2025-01-02 ENCOUNTER — ANESTHESIA (OUTPATIENT)
Dept: SURGERY | Facility: CLINIC | Age: 65
End: 2025-01-02
Payer: COMMERCIAL

## 2025-01-02 ENCOUNTER — APPOINTMENT (OUTPATIENT)
Dept: ULTRASOUND IMAGING | Facility: CLINIC | Age: 65
DRG: 418 | End: 2025-01-02
Attending: EMERGENCY MEDICINE
Payer: COMMERCIAL

## 2025-01-02 ENCOUNTER — ANESTHESIA EVENT (OUTPATIENT)
Dept: SURGERY | Facility: CLINIC | Age: 65
End: 2025-01-02
Payer: COMMERCIAL

## 2025-01-02 ENCOUNTER — HOSPITAL ENCOUNTER (INPATIENT)
Facility: CLINIC | Age: 65
LOS: 1 days | Discharge: HOME | DRG: 418 | End: 2025-01-02
Attending: EMERGENCY MEDICINE | Admitting: SURGERY
Payer: COMMERCIAL

## 2025-01-02 ENCOUNTER — APPOINTMENT (OUTPATIENT)
Dept: GENERAL RADIOLOGY | Facility: CLINIC | Age: 65
DRG: 418 | End: 2025-01-02
Attending: INTERNAL MEDICINE
Payer: COMMERCIAL

## 2025-01-02 VITALS
RESPIRATION RATE: 20 BRPM | DIASTOLIC BLOOD PRESSURE: 83 MMHG | HEART RATE: 78 BPM | TEMPERATURE: 98 F | BODY MASS INDEX: 22.06 KG/M2 | WEIGHT: 120.59 LBS | OXYGEN SATURATION: 96 % | SYSTOLIC BLOOD PRESSURE: 153 MMHG

## 2025-01-02 DIAGNOSIS — K81.0 ACUTE CHOLECYSTITIS: ICD-10-CM

## 2025-01-02 DIAGNOSIS — K85.10 ACUTE BILIARY PANCREATITIS, UNSPECIFIED COMPLICATION STATUS: ICD-10-CM

## 2025-01-02 LAB
ALBUMIN SERPL BCG-MCNC: 3.8 G/DL (ref 3.5–5.2)
ALBUMIN SERPL BCG-MCNC: 4.1 G/DL (ref 3.5–5.2)
ALP SERPL-CCNC: 79 U/L (ref 40–150)
ALP SERPL-CCNC: 93 U/L (ref 40–150)
ALT SERPL W P-5'-P-CCNC: 125 U/L (ref 0–50)
ALT SERPL W P-5'-P-CCNC: 94 U/L (ref 0–50)
ANION GAP SERPL CALCULATED.3IONS-SCNC: 10 MMOL/L (ref 7–15)
ANION GAP SERPL CALCULATED.3IONS-SCNC: 16 MMOL/L (ref 7–15)
AST SERPL W P-5'-P-CCNC: 78 U/L (ref 0–45)
AST SERPL W P-5'-P-CCNC: ABNORMAL U/L
ATRIAL RATE - MUSE: 59 BPM
BASOPHILS # BLD AUTO: 0.1 10E3/UL (ref 0–0.2)
BASOPHILS NFR BLD AUTO: 0 %
BILIRUB DIRECT SERPL-MCNC: 0.3 MG/DL (ref 0–0.3)
BILIRUB SERPL-MCNC: 0.8 MG/DL
BILIRUB SERPL-MCNC: 0.9 MG/DL
BUN SERPL-MCNC: 12.1 MG/DL (ref 8–23)
BUN SERPL-MCNC: 16.6 MG/DL (ref 8–23)
CALCIUM SERPL-MCNC: 9 MG/DL (ref 8.8–10.4)
CALCIUM SERPL-MCNC: 9.6 MG/DL (ref 8.8–10.4)
CHLORIDE SERPL-SCNC: 104 MMOL/L (ref 98–107)
CHLORIDE SERPL-SCNC: 95 MMOL/L (ref 98–107)
CREAT SERPL-MCNC: 0.76 MG/DL (ref 0.51–0.95)
CREAT SERPL-MCNC: 0.79 MG/DL (ref 0.51–0.95)
DIASTOLIC BLOOD PRESSURE - MUSE: NORMAL MMHG
EGFRCR SERPLBLD CKD-EPI 2021: 83 ML/MIN/1.73M2
EGFRCR SERPLBLD CKD-EPI 2021: 86 ML/MIN/1.73M2
EOSINOPHIL # BLD AUTO: 0 10E3/UL (ref 0–0.7)
EOSINOPHIL NFR BLD AUTO: 0 %
ERYTHROCYTE [DISTWIDTH] IN BLOOD BY AUTOMATED COUNT: 12.8 % (ref 10–15)
ERYTHROCYTE [DISTWIDTH] IN BLOOD BY AUTOMATED COUNT: 12.9 % (ref 10–15)
FLUAV RNA SPEC QL NAA+PROBE: NEGATIVE
FLUBV RNA RESP QL NAA+PROBE: NEGATIVE
GLUCOSE SERPL-MCNC: 117 MG/DL (ref 70–99)
GLUCOSE SERPL-MCNC: 175 MG/DL (ref 70–99)
HCO3 SERPL-SCNC: 22 MMOL/L (ref 22–29)
HCO3 SERPL-SCNC: 25 MMOL/L (ref 22–29)
HCT VFR BLD AUTO: 39.2 % (ref 35–47)
HCT VFR BLD AUTO: 43.6 % (ref 35–47)
HGB BLD-MCNC: 13 G/DL (ref 11.7–15.7)
HGB BLD-MCNC: 14.8 G/DL (ref 11.7–15.7)
HOLD SPECIMEN: NORMAL
HOLD SPECIMEN: NORMAL
IMM GRANULOCYTES # BLD: 0.1 10E3/UL
IMM GRANULOCYTES NFR BLD: 0 %
INTERPRETATION ECG - MUSE: NORMAL
LIPASE SERPL-CCNC: 806 U/L (ref 13–60)
LYMPHOCYTES # BLD AUTO: 1.3 10E3/UL (ref 0.8–5.3)
LYMPHOCYTES NFR BLD AUTO: 9 %
MCH RBC QN AUTO: 28.7 PG (ref 26.5–33)
MCH RBC QN AUTO: 29.4 PG (ref 26.5–33)
MCHC RBC AUTO-ENTMCNC: 33.2 G/DL (ref 31.5–36.5)
MCHC RBC AUTO-ENTMCNC: 33.9 G/DL (ref 31.5–36.5)
MCV RBC AUTO: 87 FL (ref 78–100)
MCV RBC AUTO: 87 FL (ref 78–100)
MONOCYTES # BLD AUTO: 0.6 10E3/UL (ref 0–1.3)
MONOCYTES NFR BLD AUTO: 4 %
NEUTROPHILS # BLD AUTO: 13 10E3/UL (ref 1.6–8.3)
NEUTROPHILS NFR BLD AUTO: 87 %
NRBC # BLD AUTO: 0 10E3/UL
NRBC BLD AUTO-RTO: 0 /100
P AXIS - MUSE: 62 DEGREES
PLATELET # BLD AUTO: 397 10E3/UL (ref 150–450)
PLATELET # BLD AUTO: 462 10E3/UL (ref 150–450)
POTASSIUM SERPL-SCNC: 4.2 MMOL/L (ref 3.4–5.3)
POTASSIUM SERPL-SCNC: 4.2 MMOL/L (ref 3.4–5.3)
PR INTERVAL - MUSE: 148 MS
PROT SERPL-MCNC: 5.8 G/DL (ref 6.4–8.3)
PROT SERPL-MCNC: 6.7 G/DL (ref 6.4–8.3)
QRS DURATION - MUSE: 72 MS
QT - MUSE: 466 MS
QTC - MUSE: 461 MS
R AXIS - MUSE: 63 DEGREES
RBC # BLD AUTO: 4.53 10E6/UL (ref 3.8–5.2)
RBC # BLD AUTO: 5.04 10E6/UL (ref 3.8–5.2)
RSV RNA SPEC NAA+PROBE: NEGATIVE
SARS-COV-2 RNA RESP QL NAA+PROBE: NEGATIVE
SODIUM SERPL-SCNC: 133 MMOL/L (ref 135–145)
SODIUM SERPL-SCNC: 139 MMOL/L (ref 135–145)
SYSTOLIC BLOOD PRESSURE - MUSE: NORMAL MMHG
T AXIS - MUSE: 66 DEGREES
TROPONIN T SERPL HS-MCNC: <6 NG/L
VENTRICULAR RATE- MUSE: 59 BPM
WBC # BLD AUTO: 11.5 10E3/UL (ref 4–11)
WBC # BLD AUTO: 14.9 10E3/UL (ref 4–11)

## 2025-01-02 PROCEDURE — 85025 COMPLETE CBC W/AUTO DIFF WBC: CPT | Performed by: EMERGENCY MEDICINE

## 2025-01-02 PROCEDURE — 96375 TX/PRO/DX INJ NEW DRUG ADDON: CPT

## 2025-01-02 PROCEDURE — 710N000009 HC RECOVERY PHASE 1, LEVEL 1, PER MIN: Performed by: SURGERY

## 2025-01-02 PROCEDURE — 99222 1ST HOSP IP/OBS MODERATE 55: CPT | Mod: 57 | Performed by: SURGERY

## 2025-01-02 PROCEDURE — 250N000011 HC RX IP 250 OP 636: Performed by: NURSE ANESTHETIST, CERTIFIED REGISTERED

## 2025-01-02 PROCEDURE — 36415 COLL VENOUS BLD VENIPUNCTURE: CPT | Performed by: EMERGENCY MEDICINE

## 2025-01-02 PROCEDURE — 250N000011 HC RX IP 250 OP 636

## 2025-01-02 PROCEDURE — 250N000011 HC RX IP 250 OP 636: Performed by: EMERGENCY MEDICINE

## 2025-01-02 PROCEDURE — 250N000009 HC RX 250

## 2025-01-02 PROCEDURE — 47563 LAPARO CHOLECYSTECTOMY/GRAPH: CPT | Mod: AS | Performed by: PHYSICIAN ASSISTANT

## 2025-01-02 PROCEDURE — 250N000009 HC RX 250: Performed by: NURSE ANESTHETIST, CERTIFIED REGISTERED

## 2025-01-02 PROCEDURE — 85027 COMPLETE CBC AUTOMATED: CPT | Performed by: INTERNAL MEDICINE

## 2025-01-02 PROCEDURE — 88304 TISSUE EXAM BY PATHOLOGIST: CPT | Mod: TC | Performed by: SURGERY

## 2025-01-02 PROCEDURE — 710N000012 HC RECOVERY PHASE 2, PER MINUTE: Performed by: SURGERY

## 2025-01-02 PROCEDURE — 999N000180 XR SURGERY CARM FLUORO LESS THAN 5 MIN

## 2025-01-02 PROCEDURE — 258N000003 HC RX IP 258 OP 636: Performed by: EMERGENCY MEDICINE

## 2025-01-02 PROCEDURE — 0FT44ZZ RESECTION OF GALLBLADDER, PERCUTANEOUS ENDOSCOPIC APPROACH: ICD-10-PCS | Performed by: SURGERY

## 2025-01-02 PROCEDURE — 370N000017 HC ANESTHESIA TECHNICAL FEE, PER MIN: Performed by: SURGERY

## 2025-01-02 PROCEDURE — 76705 ECHO EXAM OF ABDOMEN: CPT

## 2025-01-02 PROCEDURE — BF121ZZ FLUOROSCOPY OF GALLBLADDER USING LOW OSMOLAR CONTRAST: ICD-10-PCS | Performed by: SURGERY

## 2025-01-02 PROCEDURE — 250N000011 HC RX IP 250 OP 636: Performed by: SURGERY

## 2025-01-02 PROCEDURE — 272N000001 HC OR GENERAL SUPPLY STERILE: Performed by: SURGERY

## 2025-01-02 PROCEDURE — 96361 HYDRATE IV INFUSION ADD-ON: CPT

## 2025-01-02 PROCEDURE — 250N000011 HC RX IP 250 OP 636: Performed by: INTERNAL MEDICINE

## 2025-01-02 PROCEDURE — 88304 TISSUE EXAM BY PATHOLOGIST: CPT | Mod: 26 | Performed by: PATHOLOGY

## 2025-01-02 PROCEDURE — 258N000001 HC RX 258: Performed by: SURGERY

## 2025-01-02 PROCEDURE — 84484 ASSAY OF TROPONIN QUANT: CPT | Performed by: EMERGENCY MEDICINE

## 2025-01-02 PROCEDURE — 84155 ASSAY OF PROTEIN SERUM: CPT | Performed by: EMERGENCY MEDICINE

## 2025-01-02 PROCEDURE — 84075 ASSAY ALKALINE PHOSPHATASE: CPT | Performed by: INTERNAL MEDICINE

## 2025-01-02 PROCEDURE — 250N000025 HC SEVOFLURANE, PER MIN: Performed by: SURGERY

## 2025-01-02 PROCEDURE — 99235 HOSP IP/OBS SAME DATE MOD 70: CPT | Performed by: INTERNAL MEDICINE

## 2025-01-02 PROCEDURE — 36415 COLL VENOUS BLD VENIPUNCTURE: CPT | Performed by: INTERNAL MEDICINE

## 2025-01-02 PROCEDURE — 99207 PR NO BILLABLE SERVICE THIS VISIT: CPT | Performed by: INTERNAL MEDICINE

## 2025-01-02 PROCEDURE — 84155 ASSAY OF PROTEIN SERUM: CPT | Performed by: INTERNAL MEDICINE

## 2025-01-02 PROCEDURE — 255N000002 HC RX 255 OP 636: Performed by: SURGERY

## 2025-01-02 PROCEDURE — 47563 LAPARO CHOLECYSTECTOMY/GRAPH: CPT | Performed by: SURGERY

## 2025-01-02 PROCEDURE — 83690 ASSAY OF LIPASE: CPT | Performed by: EMERGENCY MEDICINE

## 2025-01-02 PROCEDURE — 360N000083 HC SURGERY LEVEL 3 W/ FLUORO, PER MIN: Performed by: SURGERY

## 2025-01-02 PROCEDURE — 250N000013 HC RX MED GY IP 250 OP 250 PS 637: Performed by: SURGERY

## 2025-01-02 PROCEDURE — 258N000003 HC RX IP 258 OP 636: Performed by: INTERNAL MEDICINE

## 2025-01-02 PROCEDURE — 96374 THER/PROPH/DIAG INJ IV PUSH: CPT

## 2025-01-02 PROCEDURE — 87637 SARSCOV2&INF A&B&RSV AMP PRB: CPT | Performed by: EMERGENCY MEDICINE

## 2025-01-02 PROCEDURE — 80048 BASIC METABOLIC PNL TOTAL CA: CPT | Performed by: EMERGENCY MEDICINE

## 2025-01-02 PROCEDURE — 250N000009 HC RX 250: Performed by: SURGERY

## 2025-01-02 PROCEDURE — 250N000011 HC RX IP 250 OP 636: Performed by: ANESTHESIOLOGY

## 2025-01-02 PROCEDURE — 80053 COMPREHEN METABOLIC PANEL: CPT | Performed by: EMERGENCY MEDICINE

## 2025-01-02 PROCEDURE — 84075 ASSAY ALKALINE PHOSPHATASE: CPT | Performed by: EMERGENCY MEDICINE

## 2025-01-02 PROCEDURE — 999N000141 HC STATISTIC PRE-PROCEDURE NURSING ASSESSMENT: Performed by: SURGERY

## 2025-01-02 RX ORDER — NALOXONE HYDROCHLORIDE 0.4 MG/ML
0.1 INJECTION, SOLUTION INTRAMUSCULAR; INTRAVENOUS; SUBCUTANEOUS
Status: DISCONTINUED | OUTPATIENT
Start: 2025-01-02 | End: 2025-01-02 | Stop reason: HOSPADM

## 2025-01-02 RX ORDER — HYDROMORPHONE HYDROCHLORIDE 2 MG/1
2 TABLET ORAL EVERY 4 HOURS PRN
Status: DISCONTINUED | OUTPATIENT
Start: 2025-01-02 | End: 2025-01-02 | Stop reason: HOSPADM

## 2025-01-02 RX ORDER — ACETAMINOPHEN 650 MG/1
650 SUPPOSITORY RECTAL EVERY 4 HOURS PRN
Status: DISCONTINUED | OUTPATIENT
Start: 2025-01-02 | End: 2025-01-02 | Stop reason: HOSPADM

## 2025-01-02 RX ORDER — FENTANYL CITRATE 50 UG/ML
INJECTION, SOLUTION INTRAMUSCULAR; INTRAVENOUS PRN
Status: DISCONTINUED | OUTPATIENT
Start: 2025-01-02 | End: 2025-01-02

## 2025-01-02 RX ORDER — ONDANSETRON 4 MG/1
4 TABLET, ORALLY DISINTEGRATING ORAL EVERY 30 MIN PRN
Status: DISCONTINUED | OUTPATIENT
Start: 2025-01-02 | End: 2025-01-02 | Stop reason: HOSPADM

## 2025-01-02 RX ORDER — GLYCOPYRROLATE 0.2 MG/ML
INJECTION, SOLUTION INTRAMUSCULAR; INTRAVENOUS PRN
Status: DISCONTINUED | OUTPATIENT
Start: 2025-01-02 | End: 2025-01-02

## 2025-01-02 RX ORDER — ONDANSETRON 2 MG/ML
4 INJECTION INTRAMUSCULAR; INTRAVENOUS EVERY 6 HOURS PRN
Status: DISCONTINUED | OUTPATIENT
Start: 2025-01-02 | End: 2025-01-02 | Stop reason: HOSPADM

## 2025-01-02 RX ORDER — HYDROMORPHONE HCL IN WATER/PF 6 MG/30 ML
0.4 PATIENT CONTROLLED ANALGESIA SYRINGE INTRAVENOUS EVERY 5 MIN PRN
Status: DISCONTINUED | OUTPATIENT
Start: 2025-01-02 | End: 2025-01-02 | Stop reason: HOSPADM

## 2025-01-02 RX ORDER — OXYCODONE HYDROCHLORIDE 5 MG/1
10 TABLET ORAL
Status: DISCONTINUED | OUTPATIENT
Start: 2025-01-02 | End: 2025-01-02 | Stop reason: HOSPADM

## 2025-01-02 RX ORDER — FENTANYL CITRATE 50 UG/ML
25 INJECTION, SOLUTION INTRAMUSCULAR; INTRAVENOUS EVERY 5 MIN PRN
Status: DISCONTINUED | OUTPATIENT
Start: 2025-01-02 | End: 2025-01-02 | Stop reason: HOSPADM

## 2025-01-02 RX ORDER — BUPIVACAINE HYDROCHLORIDE AND EPINEPHRINE 5; 5 MG/ML; UG/ML
INJECTION, SOLUTION PERINEURAL PRN
Status: DISCONTINUED | OUTPATIENT
Start: 2025-01-02 | End: 2025-01-02 | Stop reason: HOSPADM

## 2025-01-02 RX ORDER — SODIUM CHLORIDE, SODIUM LACTATE, POTASSIUM CHLORIDE, CALCIUM CHLORIDE 600; 310; 30; 20 MG/100ML; MG/100ML; MG/100ML; MG/100ML
INJECTION, SOLUTION INTRAVENOUS CONTINUOUS
Status: DISCONTINUED | OUTPATIENT
Start: 2025-01-02 | End: 2025-01-02 | Stop reason: HOSPADM

## 2025-01-02 RX ORDER — LABETALOL 20 MG/4 ML (5 MG/ML) INTRAVENOUS SYRINGE
PRN
Status: DISCONTINUED | OUTPATIENT
Start: 2025-01-02 | End: 2025-01-02

## 2025-01-02 RX ORDER — POLYETHYLENE GLYCOL 3350 17 G
2 POWDER IN PACKET (EA) ORAL
Status: DISCONTINUED | OUTPATIENT
Start: 2025-01-02 | End: 2025-01-02 | Stop reason: HOSPADM

## 2025-01-02 RX ORDER — AMOXICILLIN 250 MG
2 CAPSULE ORAL 2 TIMES DAILY PRN
Status: DISCONTINUED | OUTPATIENT
Start: 2025-01-02 | End: 2025-01-02 | Stop reason: HOSPADM

## 2025-01-02 RX ORDER — ONDANSETRON 2 MG/ML
4 INJECTION INTRAMUSCULAR; INTRAVENOUS EVERY 30 MIN PRN
Status: DISCONTINUED | OUTPATIENT
Start: 2025-01-02 | End: 2025-01-02 | Stop reason: HOSPADM

## 2025-01-02 RX ORDER — NALOXONE HYDROCHLORIDE 0.4 MG/ML
0.2 INJECTION, SOLUTION INTRAMUSCULAR; INTRAVENOUS; SUBCUTANEOUS
Status: DISCONTINUED | OUTPATIENT
Start: 2025-01-02 | End: 2025-01-02 | Stop reason: HOSPADM

## 2025-01-02 RX ORDER — OXYCODONE HYDROCHLORIDE 5 MG/1
5-10 TABLET ORAL EVERY 4 HOURS PRN
Qty: 20 TABLET | Refills: 0 | Status: SHIPPED | OUTPATIENT
Start: 2025-01-02

## 2025-01-02 RX ORDER — INDOCYANINE GREEN AND WATER 25 MG
2.5 KIT INJECTION ONCE
Status: COMPLETED | OUTPATIENT
Start: 2025-01-02 | End: 2025-01-02

## 2025-01-02 RX ORDER — HYDROMORPHONE HCL IN WATER/PF 6 MG/30 ML
0.2 PATIENT CONTROLLED ANALGESIA SYRINGE INTRAVENOUS EVERY 5 MIN PRN
Status: DISCONTINUED | OUTPATIENT
Start: 2025-01-02 | End: 2025-01-02 | Stop reason: HOSPADM

## 2025-01-02 RX ORDER — HYDROMORPHONE HYDROCHLORIDE 1 MG/ML
0.5 INJECTION, SOLUTION INTRAMUSCULAR; INTRAVENOUS; SUBCUTANEOUS
Status: DISCONTINUED | OUTPATIENT
Start: 2025-01-02 | End: 2025-01-02

## 2025-01-02 RX ORDER — TRAZODONE HYDROCHLORIDE 100 MG/1
100 TABLET ORAL
COMMUNITY

## 2025-01-02 RX ORDER — OXYCODONE HYDROCHLORIDE 5 MG/1
5 TABLET ORAL
Status: COMPLETED | OUTPATIENT
Start: 2025-01-02 | End: 2025-01-02

## 2025-01-02 RX ORDER — PROPOFOL 10 MG/ML
INJECTION, EMULSION INTRAVENOUS CONTINUOUS PRN
Status: DISCONTINUED | OUTPATIENT
Start: 2025-01-02 | End: 2025-01-02

## 2025-01-02 RX ORDER — CEFAZOLIN SODIUM/WATER 2 G/20 ML
2 SYRINGE (ML) INTRAVENOUS SEE ADMIN INSTRUCTIONS
Status: DISCONTINUED | OUTPATIENT
Start: 2025-01-02 | End: 2025-01-02 | Stop reason: HOSPADM

## 2025-01-02 RX ORDER — PROPOFOL 10 MG/ML
INJECTION, EMULSION INTRAVENOUS PRN
Status: DISCONTINUED | OUTPATIENT
Start: 2025-01-02 | End: 2025-01-02

## 2025-01-02 RX ORDER — DEXAMETHASONE SODIUM PHOSPHATE 4 MG/ML
INJECTION, SOLUTION INTRA-ARTICULAR; INTRALESIONAL; INTRAMUSCULAR; INTRAVENOUS; SOFT TISSUE PRN
Status: DISCONTINUED | OUTPATIENT
Start: 2025-01-02 | End: 2025-01-02

## 2025-01-02 RX ORDER — HYDROMORPHONE HYDROCHLORIDE 1 MG/ML
0.5 INJECTION, SOLUTION INTRAMUSCULAR; INTRAVENOUS; SUBCUTANEOUS EVERY 4 HOURS PRN
Status: DISCONTINUED | OUTPATIENT
Start: 2025-01-02 | End: 2025-01-02 | Stop reason: HOSPADM

## 2025-01-02 RX ORDER — DEXAMETHASONE SODIUM PHOSPHATE 4 MG/ML
4 INJECTION, SOLUTION INTRA-ARTICULAR; INTRALESIONAL; INTRAMUSCULAR; INTRAVENOUS; SOFT TISSUE
Status: DISCONTINUED | OUTPATIENT
Start: 2025-01-02 | End: 2025-01-02 | Stop reason: HOSPADM

## 2025-01-02 RX ORDER — ONDANSETRON 2 MG/ML
4 INJECTION INTRAMUSCULAR; INTRAVENOUS ONCE
Status: COMPLETED | OUTPATIENT
Start: 2025-01-02 | End: 2025-01-02

## 2025-01-02 RX ORDER — ONDANSETRON 2 MG/ML
INJECTION INTRAMUSCULAR; INTRAVENOUS PRN
Status: DISCONTINUED | OUTPATIENT
Start: 2025-01-02 | End: 2025-01-02

## 2025-01-02 RX ORDER — PIPERACILLIN SODIUM, TAZOBACTAM SODIUM 3; .375 G/15ML; G/15ML
3.38 INJECTION, POWDER, LYOPHILIZED, FOR SOLUTION INTRAVENOUS ONCE
Status: DISCONTINUED | OUTPATIENT
Start: 2025-01-02 | End: 2025-01-02

## 2025-01-02 RX ORDER — ONDANSETRON 2 MG/ML
4 INJECTION INTRAMUSCULAR; INTRAVENOUS
Status: DISCONTINUED | OUTPATIENT
Start: 2025-01-02 | End: 2025-01-02

## 2025-01-02 RX ORDER — PIPERACILLIN SODIUM, TAZOBACTAM SODIUM 4; .5 G/20ML; G/20ML
4.5 INJECTION, POWDER, LYOPHILIZED, FOR SOLUTION INTRAVENOUS EVERY 6 HOURS
Status: DISCONTINUED | OUTPATIENT
Start: 2025-01-02 | End: 2025-01-02 | Stop reason: HOSPADM

## 2025-01-02 RX ORDER — NALOXONE HYDROCHLORIDE 0.4 MG/ML
0.4 INJECTION, SOLUTION INTRAMUSCULAR; INTRAVENOUS; SUBCUTANEOUS
Status: DISCONTINUED | OUTPATIENT
Start: 2025-01-02 | End: 2025-01-02 | Stop reason: HOSPADM

## 2025-01-02 RX ORDER — ONDANSETRON 4 MG/1
4 TABLET, ORALLY DISINTEGRATING ORAL EVERY 6 HOURS PRN
Status: DISCONTINUED | OUTPATIENT
Start: 2025-01-02 | End: 2025-01-02 | Stop reason: HOSPADM

## 2025-01-02 RX ORDER — FENTANYL CITRATE 50 UG/ML
50 INJECTION, SOLUTION INTRAMUSCULAR; INTRAVENOUS EVERY 5 MIN PRN
Status: DISCONTINUED | OUTPATIENT
Start: 2025-01-02 | End: 2025-01-02 | Stop reason: HOSPADM

## 2025-01-02 RX ORDER — AMOXICILLIN 250 MG
1 CAPSULE ORAL 2 TIMES DAILY PRN
Status: DISCONTINUED | OUTPATIENT
Start: 2025-01-02 | End: 2025-01-02 | Stop reason: HOSPADM

## 2025-01-02 RX ORDER — ACETAMINOPHEN 325 MG/1
650 TABLET ORAL EVERY 4 HOURS PRN
Status: DISCONTINUED | OUTPATIENT
Start: 2025-01-02 | End: 2025-01-02 | Stop reason: HOSPADM

## 2025-01-02 RX ORDER — FENTANYL CITRATE 50 UG/ML
50 INJECTION, SOLUTION INTRAMUSCULAR; INTRAVENOUS ONCE
Status: COMPLETED | OUTPATIENT
Start: 2025-01-02 | End: 2025-01-02

## 2025-01-02 RX ORDER — CEFAZOLIN SODIUM/WATER 2 G/20 ML
2 SYRINGE (ML) INTRAVENOUS
Status: COMPLETED | OUTPATIENT
Start: 2025-01-02 | End: 2025-01-02

## 2025-01-02 RX ORDER — OXYCODONE HYDROCHLORIDE 5 MG/1
5 TABLET ORAL
Status: DISCONTINUED | OUTPATIENT
Start: 2025-01-02 | End: 2025-01-02 | Stop reason: HOSPADM

## 2025-01-02 RX ORDER — LIDOCAINE HYDROCHLORIDE 20 MG/ML
INJECTION, SOLUTION INFILTRATION; PERINEURAL PRN
Status: DISCONTINUED | OUTPATIENT
Start: 2025-01-02 | End: 2025-01-02

## 2025-01-02 RX ORDER — ONDANSETRON 4 MG/1
4 TABLET, ORALLY DISINTEGRATING ORAL EVERY 8 HOURS PRN
Qty: 10 TABLET | Refills: 0 | Status: SHIPPED | OUTPATIENT
Start: 2025-01-02

## 2025-01-02 RX ORDER — GUAIFENESIN/DEXTROMETHORPHAN 100-10MG/5
10 SYRUP ORAL EVERY 4 HOURS PRN
Status: DISCONTINUED | OUTPATIENT
Start: 2025-01-02 | End: 2025-01-02 | Stop reason: HOSPADM

## 2025-01-02 RX ORDER — KETOROLAC TROMETHAMINE 30 MG/ML
INJECTION, SOLUTION INTRAMUSCULAR; INTRAVENOUS PRN
Status: DISCONTINUED | OUTPATIENT
Start: 2025-01-02 | End: 2025-01-02

## 2025-01-02 RX ADMIN — ONDANSETRON 4 MG: 2 INJECTION INTRAMUSCULAR; INTRAVENOUS at 10:01

## 2025-01-02 RX ADMIN — ONDANSETRON 4 MG: 2 INJECTION INTRAMUSCULAR; INTRAVENOUS at 00:15

## 2025-01-02 RX ADMIN — FENTANYL CITRATE 50 MCG: 50 INJECTION, SOLUTION INTRAMUSCULAR; INTRAVENOUS at 17:40

## 2025-01-02 RX ADMIN — PROPOFOL 150 MG: 10 INJECTION, EMULSION INTRAVENOUS at 16:04

## 2025-01-02 RX ADMIN — SUGAMMADEX 200 MG: 100 INJECTION, SOLUTION INTRAVENOUS at 17:02

## 2025-01-02 RX ADMIN — PROPOFOL 50 MCG/KG/MIN: 10 INJECTION, EMULSION INTRAVENOUS at 16:10

## 2025-01-02 RX ADMIN — ROCURONIUM BROMIDE 20 MG: 50 INJECTION, SOLUTION INTRAVENOUS at 16:31

## 2025-01-02 RX ADMIN — DEXAMETHASONE SODIUM PHOSPHATE 4 MG: 4 INJECTION, SOLUTION INTRA-ARTICULAR; INTRALESIONAL; INTRAMUSCULAR; INTRAVENOUS; SOFT TISSUE at 16:04

## 2025-01-02 RX ADMIN — SODIUM CHLORIDE, POTASSIUM CHLORIDE, SODIUM LACTATE AND CALCIUM CHLORIDE: 600; 310; 30; 20 INJECTION, SOLUTION INTRAVENOUS at 04:59

## 2025-01-02 RX ADMIN — ONDANSETRON 4 MG: 2 INJECTION INTRAMUSCULAR; INTRAVENOUS at 16:57

## 2025-01-02 RX ADMIN — FENTANYL CITRATE 100 MCG: 50 INJECTION INTRAMUSCULAR; INTRAVENOUS at 16:04

## 2025-01-02 RX ADMIN — INDOCYANINE GREEN AND WATER 2.5 MG: KIT at 15:01

## 2025-01-02 RX ADMIN — KETOROLAC TROMETHAMINE 15 MG: 30 INJECTION, SOLUTION INTRAMUSCULAR at 16:57

## 2025-01-02 RX ADMIN — SODIUM CHLORIDE 1000 ML: 9 INJECTION, SOLUTION INTRAVENOUS at 00:54

## 2025-01-02 RX ADMIN — HYDROMORPHONE HYDROCHLORIDE 0.5 MG: 1 INJECTION, SOLUTION INTRAMUSCULAR; INTRAVENOUS; SUBCUTANEOUS at 10:01

## 2025-01-02 RX ADMIN — LIDOCAINE HYDROCHLORIDE 30 MG: 20 INJECTION, SOLUTION INFILTRATION; PERINEURAL at 16:04

## 2025-01-02 RX ADMIN — PIPERACILLIN AND TAZOBACTAM 4.5 G: 4; .5 INJECTION, POWDER, FOR SOLUTION INTRAVENOUS at 09:48

## 2025-01-02 RX ADMIN — FENTANYL CITRATE 50 MCG: 50 INJECTION, SOLUTION INTRAMUSCULAR; INTRAVENOUS at 14:36

## 2025-01-02 RX ADMIN — LABETALOL 20 MG/4 ML (5 MG/ML) INTRAVENOUS SYRINGE 10 MG: at 16:43

## 2025-01-02 RX ADMIN — HYDROMORPHONE HYDROCHLORIDE 1 MG: 1 INJECTION, SOLUTION INTRAMUSCULAR; INTRAVENOUS; SUBCUTANEOUS at 16:34

## 2025-01-02 RX ADMIN — PIPERACILLIN AND TAZOBACTAM 4.5 G: 4; .5 INJECTION, POWDER, FOR SOLUTION INTRAVENOUS at 04:12

## 2025-01-02 RX ADMIN — PROCHLORPERAZINE EDISYLATE 5 MG: 5 INJECTION INTRAMUSCULAR; INTRAVENOUS at 05:23

## 2025-01-02 RX ADMIN — OXYCODONE HYDROCHLORIDE 5 MG: 5 TABLET ORAL at 18:23

## 2025-01-02 RX ADMIN — HYDROMORPHONE HYDROCHLORIDE 0.5 MG: 1 INJECTION, SOLUTION INTRAMUSCULAR; INTRAVENOUS; SUBCUTANEOUS at 00:54

## 2025-01-02 RX ADMIN — Medication 2 G: at 15:51

## 2025-01-02 RX ADMIN — MIDAZOLAM 2 MG: 1 INJECTION INTRAMUSCULAR; INTRAVENOUS at 16:02

## 2025-01-02 RX ADMIN — ROCURONIUM BROMIDE 50 MG: 50 INJECTION, SOLUTION INTRAVENOUS at 16:04

## 2025-01-02 RX ADMIN — HYDROMORPHONE HYDROCHLORIDE 0.5 MG: 1 INJECTION, SOLUTION INTRAMUSCULAR; INTRAVENOUS; SUBCUTANEOUS at 04:54

## 2025-01-02 RX ADMIN — GLYCOPYRROLATE 0.2 MG: 0.2 INJECTION, SOLUTION INTRAMUSCULAR; INTRAVENOUS at 17:01

## 2025-01-02 ASSESSMENT — ACTIVITIES OF DAILY LIVING (ADL)
ADLS_ACUITY_SCORE: 43
ADLS_ACUITY_SCORE: 41
ADLS_ACUITY_SCORE: 43
ADLS_ACUITY_SCORE: 41
ADLS_ACUITY_SCORE: 43
ADLS_ACUITY_SCORE: 41
ADLS_ACUITY_SCORE: 43

## 2025-01-02 NOTE — H&P
[facility]       Hospitalist History & Physical     Assessment & Plan     ASSESSMENT    64F with hx of chronic pain, ANGI, and endometriosis s/p past abdominal surgeries presents with abdominal pain and found to have acute cholecystitis and likely gallstone pancreatitis.  Workup and treatment per below.    PLAN    Acute Cholecystitis & Gallstone Pancreatitis  -Presents with right upper quadrant abdominal pain radiating to the back  -.  Bili, AST, and alk phos WNL. Lipase 806  -US with gallbladder wall thickening, distention, and positive Benjamin sign.  CBD 3 mm  -Suspect patient recently passed gallstone but will trend LFTs  -Zosyn 4.5g q6h  -LR@125ml/hr  -Pain and nausea regimen  -NPO for GI and general surgery consultations    Other Issues  -Hyponatremia: Mild, trend  -Chronic pain: Pain regimen in place  -Hyperglycemia: Mild, no history of DM, will obtain A1c    DVT Prophy  -SCDs    Disposition  -Medically Ready for Discharge: Anticipated in 2-4 Days       Ronen Quiroz MD    History of Present Illness     Nathalie Mendosa is a 64 year old with hx of chronic pain, ANGI, and endometriosis s/p past abdominal surgeries presents with abdominal pain.  Patient was in her normal state of health until 3 PM today when she started experiencing severe right upper quadrant abdominal pain radiating to her back.  Was not after eating.  Did have associated nausea and vomiting.  Some constipation as well and thought pain was due to this, took a suppository without benefit.  Rarely uses alcohol but took 1 shot of liquor New Year's night.  States that she thought she was having a heart attack so she came to the ED.  In the ED, VSS.  Lipase 806.  .  Bili, AST, and alk phos WNL.  WBC 14.9.  Abdominal ultrasound with evidence of gallbladder wall thickening, distention, and positive Benjamin sign.  CBD 3 mm.  Admitted to medicine.    Review of Systems     A Comprehensive greater than 10 system review of systems  was carried out.  Pertinent positives and negatives are noted above.  Otherwise negative for contributory information.     Past Medical History     Past Medical History:   Diagnosis Date    Endometriosis     FAMILY HX ENDO/METAB DIS NEC 10/20/2005    Mother with high chol     Medications     Current Outpatient Medications   Medication Sig Dispense Refill    estradiol (VIVELLE-DOT) 0.05 MG/24HR bi-weekly patch Place 1 patch onto the skin twice a week. 24 patch 2    LORazepam (ATIVAN) 0.5 MG tablet Take 1 tablet (0.5 mg) by mouth every 8 hours as needed for anxiety 30 tablet 0    Multiple Vitamin (MULTI-VITAMIN DAILY PO) Take  by mouth.      traMADol (ULTRAM) 50 MG tablet Take 50 mg by mouth every 6 hours as needed for moderate pain      traZODone (DESYREL) 100 MG tablet Take 1 tablet (100 mg) by mouth at bedtime 90 tablet 1      Past Surgical History     Past Surgical History:   Procedure Laterality Date    C MAMMOGRAM, SCREENING  2008     LAPAROSCOPIC MYOMECTOMY, 1 - 4 INTRAMURAL MYOMAS =<250 GM      HCL PAP SMEAR  2008    GYN    SEXA BONE DENS PERIPHERAL  2007    TEST NOT FOUND      In vitro with # proceedures    [facility] EYE EXAM ESTABLISHED PT  2009    [facility] REVISE TOTAL HIP REPLACEMENT  7/2012    Dr Reid    [facility] TOTAL ABDOM HYSTERECTOMY      Hysterectomy, Total Abdominal  BSO    [facility] TOTAL HIP ARTHROPLASTY  9/9/2011    Hip Replacement, Total  L     Family History     Family History   Problem Relation Age of Onset    C.A.D. Mother     Psychotic Disorder Father         anxiety/depression    Breast Cancer Maternal Aunt     Cancer - colorectal Paternal Grandmother     Diabetes No family hx of      Allergies     Allergies   Allergen Reactions    Morphine Swelling    Sulfa Antibiotics      Septra rash and hives noted on  visit of 3-12-00.     Social History     Social History     Tobacco Use    Smoking status: Never     Passive exposure: Never    Smokeless tobacco: Never   Substance Use Topics    Alcohol use: Yes      Alcohol/week: 1.7 standard drinks of alcohol     Types: 2 drink(s) per week     Comment: occasional     Physical Exam   Blood pressure 126/52, pulse 58, temperature 97.2  F (36.2  C), resp. rate 16, weight 54.7 kg (120 lb 9.5 oz), SpO2 100%, not currently breastfeeding.    General: Ill appearing, cooperative with exam, in NAD.  HEENT: Atraumatic. No erythema in posterior pharynx.  Lymph: No cervical or inguinal lymphadenopathy.  Cardiac: RRR. No murmurs.  Lungs: CTAB. Nl WOB.  Abd: Right upper quadrant tenderness.  No rebound or gaurding. Nl bowel sounds.  Ext: No edema. 2+ pulses.  Skin: No rashes, abrasions, or contusions.  Psych: A&Ox3. Nl affect.  Neuro: 5/5 strength. Sensation intact.    Labs & Imaging     Reviewed and Pertinent results discussed in assessment and plan.

## 2025-01-02 NOTE — OP NOTE
General Surgery Operative Note    Pre-operative diagnosis:  Acute cholecystitis [K81.0]   Post-operative diagnosis: same   Procedure: Laparoscopic Cholecystectomy   Surgeon: Ralf Rincon MD   Assistant(s): Lisa Carlos PA-C - the physician assistant was medically necessary to assist in prepping, positioning, camera operation, retraction/exposure and closure of the port site.    Anesthesia: General    Estimated blood loss: 5 cc's   Drains placed: None   Complications:  None   Findings:  Inflamed gallbladder without obvious stones.  Intraoperative cholangiogram revealed no evidence of common duct stones.     INDICATIONS FOR OPERATION: This is a patient with upper abdominal pain and gallbladder wall thickening.  She also presented with elevation of her lipase.  Concern was raised for possible gallstone pancreatitis, though the patient was not found to have stones on ultrasound.  Laparoscopic cholecystectomy with cholangiogram was recommended.  The procedure along with its risks and complications was discussed in detail and the patient agreed to proceed.    DETAILS OF THE OPERATION: After informed consent the patient was taken to the operating room where she underwent satisfactory induction of general anesthesia.  The patient was then sterilely prepped and draped.  A supraumbilical skin incision was made using a skin knife.  The dissection was carried bluntly down to the fascia.  The fascia was opened using electrocautery and the Browne trocar was then inserted.  Pneumoperitoneum was achieved using CO2 insufflation, and under direct visualization  three  5 mm upper abdominal ports were placed.  The gallbladder was visualized and was grasped. It was pulled up over the liver and the cystic duct was exposed.  The cystic duct was then skeletonized, and a single clip was placed on the gallbladder end.  A cholangiogram catheter was inserted into the abdomen through 12-gauge Angiocath, and was then threaded into the  cystic duct and clipped in place.  C-arm cholangiogram was performed.  There was no evidence of common duct stones.  The cholangiogram catheter was now removed and the cystic duct was double clipped on the common duct end and divided.  The cystic artery was now skeletonized, triple clipped and divided.  The gallbladder was then dissected away from the liver using electrocautery.  The gallbladder was then placed in an Endo Catch bag and set aside. The gallbladder fossa was irrigated out.  There was excellent hemostasis and the clips were in good position.  The trocar sites were now infiltrated with half percent Marcaine with epinephrine.  The trochars were removed under direct visualization.  The gallbladder was removed in the Endo Catch bag through the supraumbilical incision without difficulty.  The supraumbilical fascia was then closed using 0 Vicryl suture.  Skin incisions were closed using 4-0 subcuticular Vicryl followed by Steri-Strips.    The patient was transferred to the recovery room in satisfactory condition.  Sponge and needle counts were correct at the close of the case.      Specimens:   ID Type Source Tests Collected by Time Destination   1 : Gallbladder and contents Tissue Gallbladder SURGICAL PATHOLOGY EXAM Ralf Rincon MD 1/2/2025  4:52 PM            Ralf Rincon MD

## 2025-01-02 NOTE — ANESTHESIA PREPROCEDURE EVALUATION
Anesthesia Pre-Procedure Evaluation    Patient: Nathalie Mendosa   MRN: [id number] : 1960        Procedure : Procedure(s):  CHOLECYSTECTOMY, LAPAROSCOPIC, WITH CHOLANGIOGRAM          Past Medical History:   Diagnosis Date    Endometriosis     FAMILY HX ENDO/METAB DIS NEC 10/20/2005    Mother with high chol      Past Surgical History:   Procedure Laterality Date    C MAMMOGRAM, SCREENING       LAPAROSCOPIC MYOMECTOMY, 1 - 4 INTRAMURAL MYOMAS =<250 GM      HCL PAP SMEAR      GYN    SEXA BONE DENS PERIPHERAL      TEST NOT FOUND      In vitro with # proceedures    [facility] EYE EXAM ESTABLISHED PT  2009    [facility] REVISE TOTAL HIP REPLACEMENT  2012    Dr Reid    [facility] TOTAL ABDOM HYSTERECTOMY      Hysterectomy, Total Abdominal  BSO    [facility] TOTAL HIP ARTHROPLASTY  2011    Hip Replacement, Total  L      Allergies   Allergen Reactions    Morphine Swelling    Sulfa Antibiotics      Septra rash and hives noted on  visit of 3-12-00.      Social History     Tobacco Use    Smoking status: Never     Passive exposure: Never    Smokeless tobacco: Never   Substance Use Topics    Alcohol use: Yes     Alcohol/week: 1.7 standard drinks of alcohol     Types: 2 drink(s) per week     Comment: occasional      Wt Readings from Last 1 Encounters:   25 54.7 kg (120 lb 9.5 oz)        Anesthesia Evaluation   Pt has had prior anesthetic. Type: General.    No history of anesthetic complications       ROS/MED HX  ENT/Pulmonary:  - neg pulmonary ROS     Neurologic:  - neg neurologic ROS     Cardiovascular:     (+)  hypertension- -   -  - -                                      METS/Exercise Tolerance:     Hematologic:  - neg hematologic  ROS     Musculoskeletal:  - neg musculoskeletal ROS     GI/Hepatic:  - neg GI/hepatic ROS     Renal/Genitourinary:  - neg Renal ROS     Endo:  - neg endo ROS     Psychiatric/Substance Use:  - neg psychiatric ROS     Infectious Disease:  - neg infectious disease ROS     Malignancy:      "  Other:            Physical Exam    Airway        Mallampati: II   TM distance: > 3 FB   Neck ROM: full   Mouth opening: > 3 cm    Respiratory Devices and Support         Dental       (+) Minor Abnormalities - some fillings, tiny chips      Cardiovascular   cardiovascular exam normal          Pulmonary   pulmonary exam normal                OUTSIDE LABS:  CBC:   Lab Results   Component Value Date    WBC 11.5 (H) 01/02/2025    WBC 14.9 (H) 01/02/2025    HGB 13.0 01/02/2025    HGB 14.8 01/02/2025    HCT 39.2 01/02/2025    HCT 43.6 01/02/2025     01/02/2025     (H) 01/02/2025     BMP:   Lab Results   Component Value Date     01/02/2025     (L) 01/02/2025    POTASSIUM 4.2 01/02/2025    POTASSIUM 4.2 01/02/2025    CHLORIDE 104 01/02/2025    CHLORIDE 95 (L) 01/02/2025    CO2 25 01/02/2025    CO2 22 01/02/2025    BUN 12.1 01/02/2025    BUN 16.6 01/02/2025    CR 0.79 01/02/2025    CR 0.76 01/02/2025     (H) 01/02/2025     (H) 01/02/2025     COAGS:   Lab Results   Component Value Date    PTT 32 09/09/2011    INR 0.97 09/07/2011     POC: No results found for: \"BGM\", \"HCG\", \"HCGS\"  HEPATIC:   Lab Results   Component Value Date    ALBUMIN 3.8 01/02/2025    PROTTOTAL 5.8 (L) 01/02/2025    ALT 94 (H) 01/02/2025    AST 78 (H) 01/02/2025    ALKPHOS 79 01/02/2025    BILITOTAL 0.9 01/02/2025     OTHER:   Lab Results   Component Value Date    PH 6.5 08/25/2011    AGAPITO 9.0 01/02/2025    LIPASE 806 (H) 01/02/2025    TSH 0.80 10/06/2006    T4 6.1 10/06/2006    CRP 0.21 05/07/2013    SED 6 05/24/2018       Anesthesia Plan    ASA Status:  2       Anesthesia Type: General.     - Airway: ETT   Induction: Intravenous.   Maintenance: Balanced.        Consents    Anesthesia Plan(s) and associated risks, benefits, and realistic alternatives discussed. Questions answered and patient/representative(s) expressed understanding.     - Discussed:     - Discussed with:  Patient      - Extended " Intubation/Ventilatory Support Discussed: No.      - Patient is DNR/DNI Status: No     Use of blood products discussed: No .     Postoperative Care    Pain management: IV analgesics, Oral pain medications, Multi-modal analgesia.   PONV prophylaxis: Ondansetron (or other 5HT-3), Dexamethasone or Solumedrol     Comments:               Nestor Camara MD    I have reviewed the pertinent notes and labs in the chart from the past 30 days and (re)examined the patient.  Any updates or changes from those notes are reflected in this note.     # Hyponatremia: Lowest Na = 133 mmol/L in last 2 days, will monitor as appropriate  # Hypochloremia: Lowest Cl = 95 mmol/L in last 2 days, will monitor as appropriate          # Hypertension: Noted on problem list

## 2025-01-02 NOTE — CONSULTS
"GASTROENTEROLOGY CONSULTATION      Nathalie Mendosa  [address]   [place] [address]  64 year old female     Admission Date/Time: 1/2/2025  Primary Care Provider: Sandoval Lamb  Referring / Attending Physician:  Dr. Olivera, hospitalist service     We were asked to see the patient in consultation by Dr. Olivera for evaluation of acute cholecystitis and likely gallstone pancreatitis.     HPI:  Nathalie Mendosa is a 64 year old female with PMH chronic pain, endometriosis, ANGI who presented to [facility] ER with abdominal pain radiating to the back.    , Bili, AST and Alk Phos WNL. Lipase 806. RUQ US with gallbladder wall thickening, distension, cholelithiasis, positive Benjamin sign. CBD 3mm. Patient started on IV Zosyn, pain control, anti-emetics. Has been NPO for GI and GS consultations. States she is tentatively scheduled for cholecystectomy with Dr. Rincon this afternoon.    She reports her pain came on yesterday at 3pm and she though she was having heartburn. She then started vomiting at 6pm and developed RUQ and epigastric pain.  Here now, pain is \"0/10\" with pain meds on board. She has not vomited since last night. No hematemesis or coffee ground emesis. No fevers/chills. No change in bowel habits.        PAST MEDICAL HISTORY:  Patient Active Problem List    Diagnosis Date Noted    Acute cholecystitis 01/02/2025     Priority: Medium    Acute biliary pancreatitis, unspecified complication status 01/02/2025     Priority: Medium    Mixed hyperlipidemia 03/15/2021     Priority: Medium    Insomnia, unspecified type 11/09/2016     Priority: Medium    ANGI (generalized anxiety disorder) 02/23/2016     Priority: Medium    Abnormal glucose 07/22/2014     Priority: Medium     Problem list name updated by automated process. Provider to review      ACP (advance care planning) 07/19/2014     Priority: Medium             Major depressive disorder 07/15/2013     Priority: Medium    Pain in " joint, pelvic region and thigh 01/30/2013     Priority: Medium     Chronic left hip pain - no further surgical interventions available per ortho- treated with tramadol through ortho- Dr. Archuleta      Anxiety state 01/09/2012     Priority: Medium     Problem list name updated by automated process. Provider to review      Status post hip replacement 11/02/2011     Priority: Medium    Essential hypertension, benign 06/01/2011     Priority: Medium    OA (osteoarthritis) 10/20/2010     Priority: Medium     Hips, L>R per MRI 8/10      Symptomatic menopausal or female climacteric states 10/20/2010     Priority: Medium     S/p hyst            ROS: A comprehensive ten point review of systems was negative aside from those in mentioned in the HPI.       MEDICATIONS:   Prior to Admission medications    Medication Sig Start Date End Date Taking? Authorizing Provider   estradiol (VIVELLE-DOT) 0.05 MG/24HR bi-weekly patch Place 1 patch onto the skin twice a week. 9/9/24  Yes Sandoval Lamb MD   LORazepam (ATIVAN) 0.5 MG tablet Take 1 tablet (0.5 mg) by mouth every 8 hours as needed for anxiety 6/26/24  Yes Sandoval Lamb MD   Multiple Vitamin (MULTI-VITAMIN DAILY PO) Take 1 tablet by mouth daily.   Yes Reported, Patient   traMADol (ULTRAM) 50 MG tablet Take 50 mg by mouth every 6 hours as needed for moderate pain 7/8/24  Yes Reported, Patient   traZODone (DESYREL) 100 MG tablet Take 100 mg by mouth nightly as needed for sleep.   Yes Unknown, Entered By History        ALLERGIES:   Allergies   Allergen Reactions    Morphine Swelling    Sulfa Antibiotics      Septra rash and hives noted on  visit of 3-12-00.        SOCIAL HISTORY:  Social History     Tobacco Use    Smoking status: Never     Passive exposure: Never    Smokeless tobacco: Never   Substance Use Topics    Alcohol use: Yes     Alcohol/week: 1.7 standard drinks of alcohol     Types: 2 drink(s) per week     Comment: occasional    Drug use: No         FAMILY HISTORY:  Family History   Problem Relation Age of Onset    C.A.D. Mother     Psychotic Disorder Father         anxiety/depression    Breast Cancer Maternal Aunt     Cancer - colorectal Paternal Grandmother     Diabetes No family hx of         PHYSICAL EXAM:   /71 (BP Location: Left arm)   Pulse 70   Temp 98  F (36.7  C) (Oral)   Resp 16   Wt 54.7 kg (120 lb 9.5 oz)   SpO2 98%   BMI 22.06 kg/m       PHYSICAL EXAM:  GENERAL:  Adult female laying on gurney with  at bedside. NAD. Nontoxic appearing.  SKIN: no suspicious lesions, rashes, jaundice  HEAD: Normocephalic. Atraumatic.  NECK: Neck supple.   EYES: No scleral icterus  RESPIRATORY: Nonlabored.  CARDIOVASCULAR: RRR.  GASTROINTESTINAL: +BS, soft. TTP in RUQ and epigastric area. + Benjamin sign.  JOINT/EXTREMITIES:  No gross deformities noted, normal muscle tone  NEURO: CN 2-12 grossly intact, no focal deficits  PSYCH: Normal affect     ADDITIONAL COMMENTS:   I reviewed the patient's new clinical lab test results.     Recent Labs   Lab 01/02/25  0723 01/02/25  0000   WBC 11.5* 14.9*   RBC 4.53 5.04   HGB 13.0 14.8   HCT 39.2 43.6   MCV 87 87   MCH 28.7 29.4   MCHC 33.2 33.9   RDW 12.9 12.8    462*     Recent Labs   Lab Test 01/02/25  0723 01/02/25  0000 11/11/24  0000   POTASSIUM 4.2 4.2 4.25   CHLORIDE 104 95* 101.5   CO2 25 22 29.0   BUN 12.1 16.6 13  13   ANIONGAP 10 16*  --      Recent Labs   Lab Test 01/02/25  0723 01/02/25  0100 11/11/24  0000 09/15/20  0000 02/26/19  1029   ALBUMIN 3.8 4.1 4.4   < > 4.6   BILITOTAL 0.9 0.8 1.1   < > 0.8   ALT 94* 125*  --   --  11   AST 78*  --   --   --  18   LIPASE  --  806*  --   --   --     < > = values in this interval not displayed.       No lab results found in last 7 days.  Recent Labs   Lab 01/02/25  0100   LIPASE 806*       IMAGING  RUQ US 1/2/25  Recent Results (from the past 24 hours)   US Abdomen Limited    Narrative    EXAM: US ABDOMEN LIMITED  LOCATION: [facility]  DATE: 1/2/2025    INDICATION: Acute RUQ pain, vomiting.  COMPARISON: None available.  TECHNIQUE: Limited abdominal ultrasound.    FINDINGS:    GALLBLADDER: Distended gallbladder with no cholelithiasis or pericholecystic fluid. Mild gallbladder wall thickening measuring up to 4 mm. Sonographic Benjamin's sign is positive.    BILE DUCTS: No biliary dilatation. The common duct measures 3 mm.    LIVER: Normal parenchyma with smooth contour. No focal mass.    RIGHT KIDNEY: No hydronephrosis.    PANCREAS: The visualized portions are normal.    No ascites.      Impression    IMPRESSION:  1.  Distended gallbladder with mild diffuse gallbladder wall thickening and positive sonographic Benjamin's sign. No shadowing stone visualized. Findings are indeterminate, cannot exclude acute acalculus cholecystitis.             CONSULTATION ASSESSMENT:    Nathalie Mendosa is a 64 year old female with PMH chronic pain, endometriosis, ANGI who presented to [facility] ER with abdominal pain and vomiting yesterday.    Initial , Bili, AST and Alk Phos WNL. Lipase 806. RUQ US with gallbladder wall thickening, distension, cholelithiasis, positive Benjamin sign. CBD 3mm. Patient started on IV Zosyn, pain control, anti-emetics. Has been NPO for GI and GS consultations. LFTs today with ALT trending down to 94. AST 78, bili and alk phos WNL.     Patient's pain improved today with pain meds on board and vomiting has ceased. LFT trend reassuring. Patient states she was told Dr. Morgan Rincon of  will be performing cholecystectomy this afternoon for her acute cholecystitis. She has been NPO.    Also, suspect passed gallstone as the etiology of her pancreatitis. CBD normal and alk phos and bili are normal, reassuring against choledocholithiasis, cholangitis. Discussed the case with Dr. Cox. [facility] will sign off.    Plan:  -[facility] to sign off  -Trend LFTs  -IVFs, pain control and anti-emetics per primary  -NPO  -Cholecystectomy with GS  tentatively this afternoon     52 min of total time was spent providing patient care, including patient evaluation, reviewing documentation/ test results, and .     Marly Willis PA-C  [facility] ( [facility])

## 2025-01-02 NOTE — ED PROVIDER NOTES
Emergency Department Note      History of Present Illness     Chief Complaint:  Abdominal pain    SAILAJA Mendosa is a 64 year old female who presents with her  for evaluation of right upper quadrant abdominal pain that radiates to her back and started around 3 PM today, got worse a few hours later.  No unusual food exposures.  No prior similar symptoms after eating.  No history of gallbladder problems, no prior abdominal surgeries, no urinary symptoms or changes in bowel habits.  She took some over-the-counter antacid with incomplete relief.  She still has intense pain.  No fevers or cough.  No history of kidney stones.  She does not drink alcohol frequently or recently.  No tobacco use.  No trauma.  No leg swelling, no history of DVT or PE.    Independent Historian:  at bedside who contributes to the history incorporated above.    Review of External Notes: I personally reviewed prior records including the [facility] which shows multiple prior prescriptions for tramadol.  Also clinic note in November.    Past Medical History     Medical History and Problem List   Past Medical History:   Diagnosis Date    Endometriosis     FAMILY HX ENDO/METAB DIS NEC 10/20/2005       Medications   estradiol (VIVELLE-DOT) 0.05 MG/24HR bi-weekly patch  LORazepam (ATIVAN) 0.5 MG tablet  Multiple Vitamin (MULTI-VITAMIN DAILY PO)  traMADol (ULTRAM) 50 MG tablet  traZODone (DESYREL) 100 MG tablet      Surgical History   Past Surgical History:   Procedure Laterality Date    C MAMMOGRAM, SCREENING  2008    [facility] LAPAROSCOPIC MYOMECTOMY, 1 - 4 INTRAMURAL MYOMAS =<250 GM      HCL PAP SMEAR  2008    GYN    SEXA BONE DENS PERIPHERAL  2007    TEST NOT FOUND      In vitro with # proceedures    [facility] EYE EXAM ESTABLISHED PT  2009    [facility] REVISE TOTAL HIP REPLACEMENT  7/2012    Dr Reid    [facility] TOTAL ABDOM HYSTERECTOMY      Hysterectomy, Total Abdominal  BSO    [facility] TOTAL HIP ARTHROPLASTY  9/9/2011    Hip Replacement, Total  L     Physical  Exam     Patient Vitals for the past 24 hrs:   BP Temp Pulse Resp SpO2 Weight   01/01/25 2357 126/52 97.2  F (36.2  C) 58 16 100 % --   01/01/25 2356 -- -- -- 16 -- 54.7 kg (120 lb 9.5 oz)     Physical Exam  General: Somewhat uncomfortable appearing woman sitting upright,  about  HENT: mucous membranes slightly dry  CV: rate as above, no murmur audible, no LE edema  Resp: normal effort, speaks in full phrases, no stridor, no cough observed  GI: Abdomen soft but significantly tender in the right upper quadrant with positive Benjamin sign, no distention, no tenderness over McBurney's point  MSK: no bony tenderness, no CVAT  Skin: appropriately warm and dry, no jaundice  Neuro: alert, clear speech, oriented   Psych: cooperative    Diagnostics   Electrocardiogram  ECG taken at 0005, ECG interpreted at 0054 by DOLLY Hardwick MD  sinus rhythm, no ST elevation or depression  Rate 59 bpm. OK interval 148. QRS duration 72. QTc 461    Lab Results   Labs Ordered and Resulted from Time of ED Arrival to Time of ED Departure   BASIC METABOLIC PANEL - Abnormal       Result Value    Sodium 133 (*)     Potassium 4.2      Chloride 95 (*)     Carbon Dioxide (CO2) 22      Anion Gap 16 (*)     Urea Nitrogen 16.6      Creatinine 0.76      GFR Estimate 86      Calcium 9.6      Glucose 175 (*)    CBC WITH PLATELETS AND DIFFERENTIAL - Abnormal    WBC Count 14.9 (*)     RBC Count 5.04      Hemoglobin 14.8      Hematocrit 43.6      MCV 87      MCH 29.4      MCHC 33.9      RDW 12.8      Platelet Count 462 (*)     % Neutrophils 87      % Lymphocytes 9      % Monocytes 4      % Eosinophils 0      % Basophils 0      % Immature Granulocytes 0      NRBCs per 100 WBC 0      Absolute Neutrophils 13.0 (*)     Absolute Lymphocytes 1.3      Absolute Monocytes 0.6      Absolute Eosinophils 0.0      Absolute Basophils 0.1      Absolute Immature Granulocytes 0.1      Absolute NRBCs 0.0     HEPATIC FUNCTION PANEL - Abnormal    Protein Total 6.7       Albumin 4.1      Bilirubin Total 0.8      Alkaline Phosphatase 93      AST         (*)     Bilirubin Direct 0.30     LIPASE - Abnormal    Lipase 806 (*)    TROPONIN T, HIGH SENSITIVITY - Normal    Troponin T, High Sensitivity <6     INFLUENZA A/B, RSV AND SARS-COV2 PCR - Normal    Influenza A PCR Negative      Influenza B PCR Negative      RSV PCR Negative      SARS CoV2 PCR Negative       Imaging   US Abdomen Limited   Final Result   IMPRESSION:   1.  Distended gallbladder with mild diffuse gallbladder wall thickening and positive sonographic Benjamin's sign. No shadowing stone visualized. Findings are indeterminate, cannot exclude acute acalculus cholecystitis.              ED Course      Medications Administered   Medications   acetaminophen (TYLENOL) tablet 650 mg (has no administration in time range)     Or   acetaminophen (TYLENOL) Suppository 650 mg (has no administration in time range)   senna-docusate (SENOKOT-S/PERICOLACE) 8.6-50 MG per tablet 1 tablet (has no administration in time range)     Or   senna-docusate (SENOKOT-S/PERICOLACE) 8.6-50 MG per tablet 2 tablet (has no administration in time range)   ondansetron (ZOFRAN ODT) ODT tab 4 mg (has no administration in time range)     Or   ondansetron (ZOFRAN) injection 4 mg (has no administration in time range)   nicotine (COMMIT) lozenge 2 mg (has no administration in time range)   benzocaine-menthol (CHLORASEPTIC) 6-10 MG lozenge 1 lozenge (has no administration in time range)   guaiFENesin-dextromethorphan (ROBITUSSIN DM) 100-10 MG/5ML syrup 10 mL (has no administration in time range)   lactated ringers infusion (has no administration in time range)   HYDROmorphone (DILAUDID) tablet 2 mg (has no administration in time range)   HYDROmorphone (PF) (DILAUDID) injection 0.5 mg (has no administration in time range)   piperacillin-tazobactam (ZOSYN) 4.5 g vial to attach to  mL bag (has no administration in time range)   ondansetron (ZOFRAN)  injection 4 mg (4 mg Intravenous $Given 1/2/25 0015)   sodium chloride 0.9% BOLUS 1,000 mL (0 mLs Intravenous Stopped 1/2/25 0233)     ED Course and Discussion of Management   ED Course as of 01/02/25 0331   Thu Jan 02, 2025   0220 I rechecked patient, discussed test results.   0231 I spoke with Dr. Quiroz, Hospitalist, who accepts.  We agreed to initiate Zosyn.       Additional Documentation  None    Medical Decision Making / Diagnosis   Medical Decision Making:  I think her symptoms are best explained by acute cholecystitis, she has a Benjamin sign on exam and also a sonographic Benjamin's, leukocytosis, and ongoing pain.  Lipase also elevated consistent with biliary pancreatitis.  She denies any significant alcohol use.  Vital signs are satisfactory but I think she requires hospitalization for further treatment as well as expedited surgical consultation and GI consultation, I have arranged for her to be admitted to the hospitalist service.  I do not think she requires emergent CT imaging.  Alternate etiologies including ureteral stone and bowel obstruction among many others were considered.  Patient and  notified of these findings and plan of care and in agreement.    Disposition   Admission to [facility] Hospitalist service for further multidisciplinary care.    Diagnosis     ICD-10-CM    1. Acute cholecystitis  K81.0       2. Acute biliary pancreatitis, unspecified complication status  K85.10          1/2/2025   MD Ronen Veliz, Zac Cortez MD  01/02/25 9722

## 2025-01-02 NOTE — ANESTHESIA PROCEDURE NOTES
Airway       Patient location during procedure: OR       Procedure Start/Stop Times: 1/2/2025 4:07 PM  Staff -        CRNA: Nestor Hilario APRN CRNA       Performed By: CRNAIndications and Patient Condition       Indications for airway management: joseph-procedural and airway protection       Induction type:intravenous       Mask difficulty assessment: 1 - vent by mask    Final Airway Details       Final airway type: endotracheal airway       Successful airway: ETT - single  Endotracheal Airway Details        ETT size (mm): 7.0       Cuffed: yes       Successful intubation technique: direct laryngoscopy       DL Blade Type: MAC 3       Grade View of Cords: 1 (with Cricoid Pressure)       Adjucts: stylet       Position: Right       Measured from: gums/teeth       Secured at (cm): 23       Bite block used: None    Post intubation assessment        Placement verified by: capnometry, equal breath sounds and chest rise        Number of attempts at approach: 1       Secured with: tape       Ease of procedure: easy       Dentition: Intact    Medication(s) Administered   Medication Administration Time: 1/2/2025 4:07 PM

## 2025-01-02 NOTE — CONSULTS
Chief complaint:  Abdominal pain, right upper quadrant    HPI:  This patient is a 64 year old female who presents with right upper quadrant abdominal pain.  She has a history of endometriosis.  She presented with right upper quadrant abdominal pain and was found to have elevation of her lipase, as well as her ALT.  Total bilirubin was normal.  Ultrasound revealed gallbladder wall thickening, but no stones.    Past Medical History:   has a past medical history of Endometriosis and FAMILY HX ENDO/METAB DIS NEC (10/20/2005).    Past Surgical History:  Past Surgical History:   Procedure Laterality Date    C MAMMOGRAM, SCREENING  2008     LAPAROSCOPIC MYOMECTOMY, 1 - 4 INTRAMURAL MYOMAS =<250 GM      HCL PAP SMEAR  2008    GYN    SEXA BONE DENS PERIPHERAL  2007    TEST NOT FOUND      In vitro with # proceedures    [facility] EYE EXAM ESTABLISHED PT  2009    [facility] REVISE TOTAL HIP REPLACEMENT  7/2012    Dr Reid    [facility] TOTAL ABDOM HYSTERECTOMY      Hysterectomy, Total Abdominal  BSO    [facility] TOTAL HIP ARTHROPLASTY  9/9/2011    Hip Replacement, Total  L        Social History:  Social History     Socioeconomic History    Marital status:      Spouse name: Sara    Number of children: 1    Years of education: 14    Highest education level: Not on file   Occupational History    Occupation: Retired     Employer: [employer]   Tobacco Use    Smoking status: Never     Passive exposure: Never    Smokeless tobacco: Never   Substance and Sexual Activity    Alcohol use: Yes     Alcohol/week: 1.7 standard drinks of alcohol     Types: 2 drink(s) per week     Comment: occasional    Drug use: No    Sexual activity: Yes     Partners: Male     Birth control/protection: Surgical     Comment: hysterectomy   Other Topics Concern     Service Not Asked    Blood Transfusions Not Asked    Caffeine Concern Not Asked    Occupational Exposure Not Asked    Hobby Hazards Not Asked    Sleep Concern Not Asked    Stress Concern Not Asked     Weight Concern Not Asked    Special Diet Not Asked    Back Care Not Asked    Exercise Yes    Bike Helmet Not Asked    Seat Belt Yes    Self-Exams Yes   Social History Narrative    ABUSE HISTORY:        History of abusive relationships in past:    No    History of abusive relationships currently:    No    Feels safe in home and work envirmt.                   Yes     Social Drivers of Health     Financial Resource Strain: Not on file   Food Insecurity: Not on file   Transportation Needs: Not on file   Physical Activity: Not on file   Stress: Not on file   Social Connections: Not on file   Interpersonal Safety: Not on file   Housing Stability: Not on file        Family History:  Family History   Problem Relation Age of Onset    C.A.D. Mother     Psychotic Disorder Father         anxiety/depression    Breast Cancer Maternal Aunt     Cancer - colorectal Paternal Grandmother     Diabetes No family hx of        Review of Systems:  The 10 point Review of Systems is negative other than noted in the HPI and above.    Physical Exam:  General - This is a well developed, well nourished female in no apparent distress.  HEENT - Normocephalic, atraumatic.  No scleral icterus.  Neck - supple without masses  Lungs - clear to ascultation.    Heart - Regular rate & rhythm without murmur  Abdomen:   soft, non-distended with tenderness noted in the right upper quadrant . no masses palpated. normal bowel sounds.  Extremities - warm without edema  Neurologic - nonfocal    Relevant labs:  Labs this morning revealed an alkaline phosphatase of 79, ALT of 94, AST of 78, and total bilirubin of 0.9.    Imaging:  Ultrasound showed gallbladder wall thickening consistent with acalculous cholecystitis.  There is no evidence of ductal dilatation.  Sonographic Benjamin sign was positive.    Assessment and Plan:  This is a patient with imaging and exam concerning for acute cholecystitis.  She also has some elevation of lipase, suggestive of possible  pancreatitis.  This could conceivably be gallstone pancreatitis.  I have therefore recommended laparoscopic cholecystectomy with intraoperative cholangiogram.  We discussed the procedure, along with its risks and complications, in detail.  The patient has agreed to proceed.  We will go to the operating room this afternoon, as soon as the room is available.      Ralf Rincon MD  [facility]

## 2025-01-02 NOTE — ANESTHESIA CARE TRANSFER NOTE
Patient: Nathalie Mendosa    Procedure: Procedure(s):  CHOLECYSTECTOMY, LAPAROSCOPIC, WITH CHOLANGIOGRAM       Diagnosis: Acute cholecystitis [K81.0]  Diagnosis Additional Information: No value filed.    Anesthesia Type:   General     Note:    Oropharynx: oropharynx clear of all foreign objects  Level of Consciousness: awake  Oxygen Supplementation: face mask  Level of Supplemental Oxygen (L/min / FiO2): 6  Independent Airway: airway patency satisfactory and stable  Dentition: dentition unchanged  Vital Signs Stable: post-procedure vital signs reviewed and stable  Report to RN Given: handoff report given  Patient transferred to: PACU    Handoff Report: Identifed the Patient, Identified the Reponsible Provider, Reviewed the pertinent medical history, Discussed the surgical course, Reviewed Intra-OP anesthesia mangement and issues during anesthesia, Set expectations for post-procedure period and Allowed opportunity for questions and acknowledgement of understanding      Vitals:  Vitals Value Taken Time   BP     Temp 96.8  F (36  C) 01/02/25 1719   Pulse     Resp 44 01/02/25 1719   SpO2 100 % 01/02/25 1719   Vitals shown include unfiled device data.    Electronically Signed By: CARMENZA Ramirez CRNA  January 2, 2025  5:21 PM

## 2025-01-02 NOTE — ANESTHESIA POSTPROCEDURE EVALUATION
Patient: Nathalie Mendosa    Procedure: Procedure(s):  CHOLECYSTECTOMY, LAPAROSCOPIC, WITH CHOLANGIOGRAM       Anesthesia Type:  General    Note:  Disposition: Inpatient   Postop Pain Control: Uneventful            Sign Out: Well controlled pain   PONV: No   Neuro/Psych: Uneventful            Sign Out: Acceptable/Baseline neuro status   Airway/Respiratory: Uneventful            Sign Out: Acceptable/Baseline resp. status   CV/Hemodynamics: Uneventful            Sign Out: Acceptable CV status; No obvious hypovolemia; No obvious fluid overload   Other NRE: NONE   DID A NON-ROUTINE EVENT OCCUR? No           Last vitals:  Vitals Value Taken Time   /90 01/02/25 1736   Temp 97.7  F (36.5  C) 01/02/25 1739   Pulse 85 01/02/25 1739   Resp 39 01/02/25 1739   SpO2 97 % 01/02/25 1739   Vitals shown include unfiled device data.    Electronically Signed By: Nestor Camara MD  January 2, 2025  5:40 PM

## 2025-01-02 NOTE — ED NOTES
[facility]  ED Nurse Handoff Report    ED Chief complaint: Abdominal Pain and Chest Pain  . ED Diagnosis:   Final diagnoses:   Acute cholecystitis   Acute biliary pancreatitis, unspecified complication status       Allergies:   Allergies   Allergen Reactions    Morphine Swelling    Sulfa Antibiotics      Septra rash and hives noted on UC visit of 3-12-00.       Code Status: Full Code    Activity level - Baseline/Home:  independent.  Activity Level - Current:   independent.   Lift room needed: No.   Bariatric: No   Needed: No   Isolation: No.   Infection: Not Applicable.     Respiratory status: Room air    Vital Signs (within 30 minutes):   Vitals:    01/01/25 2356 01/01/25 2357   BP:  126/52   Pulse:  58   Resp: 16 16   Temp:  97.2  F (36.2  C)   SpO2:  100%   Weight: 54.7 kg (120 lb 9.5 oz)        Cardiac Rhythm:  ,      Pain level:    Patient confused: No.   Patient Falls Risk: patient and family education.   Elimination Status: Has voided     Patient Report - Initial Complaint: right upper quadrant abdominal pain that radiates to her back and started around 3 PM today, got worse a few hours later. No unusual food exposures. No prior similar symptoms after eating. No history of gallbladder problems, no prior abdominal surgeries, no urinary symptoms or changes in bowel habits. She took some over-the-counter antacid with incomplete relief. .   Focused Assessment: pain has improved after meds     Abnormal Results:   Labs Ordered and Resulted from Time of ED Arrival to Time of ED Departure   BASIC METABOLIC PANEL - Abnormal       Result Value    Sodium 133 (*)     Potassium 4.2      Chloride 95 (*)     Carbon Dioxide (CO2) 22      Anion Gap 16 (*)     Urea Nitrogen 16.6      Creatinine 0.76      GFR Estimate 86      Calcium 9.6      Glucose 175 (*)    CBC WITH PLATELETS AND DIFFERENTIAL - Abnormal    WBC Count 14.9 (*)     RBC Count 5.04      Hemoglobin 14.8      Hematocrit 43.6       MCV 87      MCH 29.4      MCHC 33.9      RDW 12.8      Platelet Count 462 (*)     % Neutrophils 87      % Lymphocytes 9      % Monocytes 4      % Eosinophils 0      % Basophils 0      % Immature Granulocytes 0      NRBCs per 100 WBC 0      Absolute Neutrophils 13.0 (*)     Absolute Lymphocytes 1.3      Absolute Monocytes 0.6      Absolute Eosinophils 0.0      Absolute Basophils 0.1      Absolute Immature Granulocytes 0.1      Absolute NRBCs 0.0     HEPATIC FUNCTION PANEL - Abnormal    Protein Total 6.7      Albumin 4.1      Bilirubin Total 0.8      Alkaline Phosphatase 93      AST         (*)     Bilirubin Direct 0.30     LIPASE - Abnormal    Lipase 806 (*)    TROPONIN T, HIGH SENSITIVITY - Normal    Troponin T, High Sensitivity <6     INFLUENZA A/B, RSV AND SARS-COV2 PCR - Normal    Influenza A PCR Negative      Influenza B PCR Negative      RSV PCR Negative      SARS CoV2 PCR Negative          US Abdomen Limited   Final Result   IMPRESSION:   1.  Distended gallbladder with mild diffuse gallbladder wall thickening and positive sonographic Benjamin's sign. No shadowing stone visualized. Findings are indeterminate, cannot exclude acute acalculus cholecystitis.                Treatments provided: labs, imaging, meds  Family Comments: @ bedside  OBS brochure/video discussed/provided to patient:  N/A  ED Medications:   Medications   ondansetron (ZOFRAN) injection 4 mg (has no administration in time range)   lactated ringers infusion (has no administration in time range)   HYDROmorphone (DILAUDID) tablet 2 mg (has no administration in time range)   HYDROmorphone (PF) (DILAUDID) injection 0.5 mg (has no administration in time range)   piperacillin-tazobactam (ZOSYN) 3.375 g vial to attach to  mL bag (has no administration in time range)   ondansetron (ZOFRAN) injection 4 mg (4 mg Intravenous $Given 1/2/25 0015)   sodium chloride 0.9% BOLUS 1,000 mL (0 mLs Intravenous Stopped 1/2/25 5731)       Drips infusing:   Yes  For the majority of the shift this patient was Green.   Interventions performed were labs, imaging, meds.    Sepsis treatment initiated: No    Cares/treatment/interventions/medications to be completed following ED care: per MD orders    ED Nurse Name: SIERRA HERRON RN  2:47 AM

## 2025-01-02 NOTE — PLAN OF CARE
[facility]    ED Boarding Nurse Handoff Addendum Report:    Date/time: 1/2/2025, 7:01 AM      Neuro: Alert and Oriented x4  Activity: have not been out of bed yet  Telemetry Monitoring: No  Pain: complaining of 3-5/10 pain in their upper right abdomen.  Dilaudid given for pain.  Labs / Tests:   GI: bowel sounds audible, abdominal discomfort  O2: RA  LDA's: Peripheral  Fluids: has Lactated Ringer's running at 125 mL per hour.  Diet: NPO  Consults: GI and General Surgery  Discharge Disposition:  TBD    Plan of Care:    Pain management  Nausea management      Vital signs (within last 30 minutes):    Vitals:    01/01/25 2356 01/01/25 2357 01/02/25 0400 01/02/25 0658   BP:  126/52 (!) 163/81    BP Location:   Left arm    Pulse:  58 66    Resp: 16 16 16    Temp:  97.2  F (36.2  C)     SpO2:  100% 97% 99%   Weight: 54.7 kg (120 lb 9.5 oz)            ED Boarding Nurse name: Alisia Moise RN      RECEIVING UNIT ED HANDOFF REVIEW    Above ED Nurse Handoff Report was reviewed: Yes  Reviewed by: Quoc Veliz RN on January 2, 2025 at 12:55 PM   VIKAS Valles called the ED to inform them the note was read: No    Room not cleaned yet.

## 2025-01-02 NOTE — PLAN OF CARE
Goal Outcome Evaluation:         [facility]    ED Boarding Nurse Handoff Addendum Report:    Date/time: 1/2/2025, 1:03 PM    Activity Level: standby    Fall Risk: No    Active Infusions: yes, LR @125ml/hr    Current Meds Due: see MAR    Current care needs: no    Oxygen requirements (liters/min and/or FiO2): none    Respiratory status: Room air    Vital signs (within last 30 minutes):    Vitals:    01/02/25 0400 01/02/25 0658 01/02/25 0748 01/02/25 1300   BP: (!) 163/81  132/71 121/64   BP Location: Left arm  Left arm Right leg   Pulse: 66  70 62   Resp: 16  16 16   Temp:   98  F (36.7  C) 98.2  F (36.8  C)   TempSrc:   Oral Oral   SpO2: 97% 99% 98% 99%   Weight:           Focused assessment within last 30 minutes:    Pt alert and oriented, continues to have ongoing right upper abd pain. Report given to same-day surgery RN. Spouse at bedside.    ED Boarding Nurse name: Herminia Boss RN          RECEIVING UNIT ED HANDOFF REVIEW    Above ED Nurse Handoff Report was reviewed: Yes  Reviewed by: Quoc Veliz RN on January 2, 2025 at 1:29 PM   VIKAS Valles called the ED to inform them the note was read: No

## 2025-01-02 NOTE — ED TRIAGE NOTES
RUQ abdominal pain and chest pain that started today. Patient also has had multiple episodes of emesis.

## 2025-01-02 NOTE — PHARMACY-ADMISSION MEDICATION HISTORY
Pharmacy Intern Admission Medication History    Admission medication history is complete. The information provided in this note is only as accurate as the sources available at the time of the update.    Information Source(s): Patient and CareEverywhere/SureScripts via in-person    Pertinent Information: None     Changes made to PTA medication list:  Added: None  Deleted: None  Changed: Trazodone QHS-> HS PRN     Allergies reviewed with patient and updates made in EHR: yes    Medication History Completed By: Tabby Valentino RPH 1/2/2025 8:47 AM    PTA Med List   Medication Sig Note Last Dose/Taking    estradiol (VIVELLE-DOT) 0.05 MG/24HR bi-weekly patch Place 1 patch onto the skin twice a week. 1/2/2025: Patch is currently on Taking    LORazepam (ATIVAN) 0.5 MG tablet Take 1 tablet (0.5 mg) by mouth every 8 hours as needed for anxiety  Unknown    Multiple Vitamin (MULTI-VITAMIN DAILY PO) Take 1 tablet by mouth daily.  1/1/2025    traMADol (ULTRAM) 50 MG tablet Take 50 mg by mouth every 6 hours as needed for moderate pain  1/1/2025    traZODone (DESYREL) 100 MG tablet Take 100 mg by mouth nightly as needed for sleep.  Past Month

## 2025-01-02 NOTE — DISCHARGE INSTRUCTIONS
Maximum acetaminophen (Tylenol) dose from all sources should not exceed 4 grams (4000 mg) per day.    You received Toradol, an IV form of Ibuprofen (Motrin) at 5 pm.  Do not take any Ibuprofen products until 11 pm if needed.    HOME CARE FOLLOWING LAPAROSCOPIC CHOLECYSTECTOMY  KASANDRA Vazquez, ISAURA Humphries, SURINDER Bhakta, DOLLY Pompa    INCISIONAL CARE:  Replace the bandage over your incisions DAILY until all drainage stops, or if more comfortable to have in place.  If present, leave the steri-strips (white paper tapes) in place for 14 days after surgery.  If Dermabond (a type of skin glue) is present, leave in place until it wears/flakes off (2-3 weeks).     BATHING:  OK to shower 48 hours after surgery.  Avoid baths for 1 week after surgery.  You may wash your hair at any time.  Gently pat your incision dry after bathing.  Do not apply lotions, creams, or ointments to incisions.    ACTIVITY:  Light Activity -- you may immediately be up and about as tolerated.  Walking is encouraged, increase as tolerated.  Driving/Light Work-- when comfortable and off narcotic pain medications.  Strenuous Work/Activity -- limit lifting to 20 pounds for 2 weeks.  Progressively increase with time.  Active Sports (running, biking, etc.) -- cautiously resume after 2 weeks.    DISCOMFORT:  Local anesthetic placed at surgery should provide relief for 4-8 hours.  Begin taking pain pills before discomfort is severe.  Take the pain medication with some food, when possible, to minimize side effects.  Intermittent use of ice packs may help during the first 1-3 weeks after surgery.  Expect gradual improvement.    Over-the-counter anti-inflammatory medications (i.e. Ibuprofen/Advil/Motrin or Naprosyn/Aleve) may be used per package instructions in addition to or while tapering off the narcotic pain medications to decrease swelling and sensitivity.  DO NOT TAKE these Anti-inflammatory medications if your primary physician has  advised against doing so, or if you have acid reflux, ulcer, or bleeding disorder, or take blood-thinner medications.  Call your primary physician or the surgery office if you have medication questions.    After laparoscopic cholecystectomy, you may have shoulder or upper back discomfort due to the gas used during surgery.  This is temporary and should resolve within 2-3 days.  Frequent short walks may help with this.  You may have decreased energy level for 1-2 weeks after surgery related to your recovery.    DIET:  Start with liquids and gradually increase diet as tolerated.  Drink plenty of fluids.  While taking pain medications, consider use of a stool softener, increase your fiber in your diet, or add a fiber supplement (like Metamucil, Citrucel) to help prevent constipation - a possible side effect of pain medications.  It is not uncommon to experience some bowel changes (loose stools or constipation) after surgery.  Your body has to adapt to you no longer having a gall bladder.  To help minimize this side effect, avoid fatty foods for 1-2 weeks after surgery.  You may then slowly increase the amount of fatty foods in your diet.      NAUSEA:  If nauseated from the anesthetic/pain meds; rest in bed, get up cautiously with assistance, and drink clear liquids (juice, tea, broth).    FOLLOW-UP AFTER SURGERY:  -Our office will contact you approximately 2-3 weeks after surgery to check on your progress and answer any questions you may have.  If you are doing well, you will not need to return for an office appointment.  If any concerns are identified over the phone, we will help you make an appointment to see a provider.    -If you have not received a phone call, have any questions or concerns, or would like to be seen, please call us at [phone].  [facility] [address] [address]; [place] [address]    -CONTACT US IF THE FOLLOWING DEVELOPS:   1. A fever that is above 101     2. Increased  redness, warmth, drainage, bleeding, or swelling.   3. Pain that is not relieved by rest/ice and your prescription.   4.  Increasing pain after 48 hours.   5. Drainage that is thick, cloudy, yellow, green or white.   6. Any other questions or concerns.      FREQUENTLY ASKED QUESTIONS:    Q:  How should my incision look?    A:  Normally your incision will appear slightly swollen with light redness directly along the incision itself as it heals.  It may feel like a bump or ridge as the healing/scarring happens, and over time (3-4 months) this bump or ridge feeling should slowly go away.  In general, clear or pink watery drainage can be normal at first as your incision heals, but should decrease over time.    Q:  How do I know if my incision is infected?  A:  Look at your incision for signs of infection, like redness around the incision spreading to surrounding skin, or drainage of cloudy or foul-smelling drainage.  If you feel warm, check your temperature to see if you are running a fever.    **If any of these things occur, please notify the nurse at our office.  We may need you to come into the office for an incision check.      Q:  How do I take care of my incision?  A:  If you have a dressing in place - Starting the day after surgery, replace the dressing 1-2 times a day until there is no further drainage from the incision.  At that time, a dressing is no longer needed.  Try to minimize tape on the skin if irritation is occurring at the tape sites.  If you have significant irritation from tape on the skin, please call the office to discuss other method of dressing your incision.    Small pieces of tape called  steri-strips  may be present directly overlying your incision; these may be removed 10 days after surgery unless otherwise specified by your surgeon.  If these tapes start to loosen at the ends, you may trim them back until they fall off or are removed.    A:  If you had  Dermabond  tissue glue used as a  dressing (this causes your incision to look shiny with a clear covering over it) - This type of dressing wears off with time and does not require more dressings over the top unless it is draining around the glue as it wears off.  Do not apply ointments or lotions over the incisions until the glue has completely worn off.    Q:  There is a piece of tape or a sticky  lead  still on my skin.  Can I remove this?  A:  Sometimes the sticky  leads  used for monitoring during surgery or for evaluation in the emergency department are not all removed while you are in the hospital.  These sometimes have a tab or metal dot on them.  You can easily remove these on your own, like taking off a band-aid.  If there is a gel substance under the  lead , simply wipe/clean it off with a washcloth or paper towel.      Q:  What can I do to minimize constipation (very hard stools, or lack of stools)?  A:  Stay well hydrated.  Increase your dietary fiber intake or take a fiber supplement -with plenty of water.  Walk around frequently.  You may consider an over-the-counter stool-softener.  Your Pharmacist can assist you with choosing one that is stocked at your pharmacy.  Constipation is also one of the most common side effects of pain medication.  If you are using pain medication, be pro-active and try to PREVENT problems with constipation by taking the steps above BEFORE constipation becomes a problem.    Q:  What do I do if I need more pain medications?  A:  Call the office to receive refills.  Be aware that certain pain meds cannot be called into a pharmacy and actually require a paper prescription.  A change may be made in your pain med as you progress thru your recovery period or if you have side effects to certain meds.    --Pain meds are NOT refilled after 5pm on weekdays, and NOT AT ALL on the weekends, so please look ahead to prevent problems.      Q:  Why am I having a hard time sleeping now that I am at home?  A:  Many  medications you receive while you are in the hospital can impact your sleep for a number of days after your surgery/hospitalization.  Decreased level of activity and naps during the day may also make sleeping at night difficult.  Try to minimize day-time naps, and get up frequently during the day to walk around your home during your recovery time.  Sleep aides may be of some help, but are not recommended for long-term use.      Q:  I am having some back discomfort.  What should I do?  A:  This may be related to certain positioning that was required for your surgery, extended periods of time in bed, or other changes in your overall activity level.  You may try ice, heat, acetaminophen, or ibuprofen to treat this temporarily.  Note that many pain medications have acetaminophen in them and would state this on the prescription bottle.  Be sure not to exceed the maximum of 4000mg per day of acetaminophen.     **If the pain you are having does not resolve, is severe, or is a flare of back pain you have had on other occasions prior to surgery, please contact your primary physician for further recommendations or for an appointment to be examined at their office.    Q:  Why am I having headaches?  A:  Headaches can be caused by many things:  caffeine withdrawal, use of pain meds, dehydration, high blood pressure, lack of sleep, over-activity/exhaustion, flare-up of usual migraine headaches.  If you feel this is related to muscle tension (a band-like feeling around the head, or a pressure at the low-back of the head) you may try ice or heat to this area.  You may need to drink more fluids (try electrolyte drink like Gatorade), rest, or take your usual migraine medications.   **If your headaches do not resolve, worsen, are accompanied by other symptoms, or if your blood pressure is high, please call your primary physician for recommendation and/or examination.    Q:  I am unable to urinate.  What do I do?  A:  A small  percentage of people can have difficulty urinating initially after surgery.  This includes being able to urinate only a very small amount at a time and feeling discomfort or pressure in the very low abdomen.  This is called  urinary retention , and is actually an urgent situation.  Proceed to your nearest Emergency department for evaluation (not an Urgent Care Center).  Sometimes the bladder does not work correctly after certain medications you receive during surgery, or related to certain procedures.  You may need to have a catheter placed until your bladder recovers.  When planning to go to an Emergency department, it may help to call the ER to let them know you are coming in for this problem after a surgery.  This may help you get in quicker to be evaluated.  **If you have symptoms of a urinary tract infection, please contact your primary physician for the proper evaluation and treatment.          If you have other questions, please call the office Monday thru Friday between 8am and 4:30pm to discuss with the nurse or physician assistant.  [phone]    There is a surgeon ON CALL on weekday evenings and over the weekend in case of urgent need only, and may be contacted at the same number.    If you are having an emergency, call 911 or proceed to your nearest emergency department.

## 2025-01-03 NOTE — PROGRESS NOTES
Patient admitted by my colleague earlier today.  Patient seen after surgery in the recovery area.  She is feeling well.  Surgical service feels she can d/c home this evening.    She also felt comfortable with that plan.  Patient encouraged to call with questions/seek eval if any concerns after discharge.  Full d/c summary to follow.

## 2025-01-03 NOTE — DISCHARGE SUMMARY
"[facility]  Discharge Summary  Hospitalist    Date of Admission:  1/2/2025  Date of Discharge:  {DISCHARGE DATE:564327}  Provider:  Colin Kaplan DO, FHM    Discharge Diagnoses   ***    Other medical issues:  Past Medical History:   Diagnosis Date    Endometriosis     FAMILY HX ENDO/METAB DIS NEC 10/20/2005    Mother with high chol       History of Present Illness   Nathalie Mendosa is an 64 year old female who presented with ***.  Please see the admission history and physical for full details.    Hospital Course   Nathalie Mendosa presenting on 1/2/2025.  The following problems were addressed during her hospitalization:    ***      Significant Results and Procedures   ***    Pending Results   ***  Unresulted Labs Ordered in the Past 30 Days of this Admission       Date and Time Order Name Status Description    1/2/2025  4:54 PM Surgical Pathology Exam In process             Code Status   {CODE STATUS      :952129}       Primary Care Physician   Sandoval Lamb    {Do you want to add a physical exam section?:212712}    Discharge Disposition   {                 :3922619::\"Discharged to home\"}    Consultations This Hospital Stay   SURGERY GENERAL IP CONSULT  GASTROENTEROLOGY IP CONSULT    Time Spent on this Encounter   {How much time did you spend on discharge?:12876071}***    Discharge Orders      When to call - Contact Surgeon Team    You may experience symptoms that require follow-up before your scheduled appointment. Contact your Surgeon Team if you are concerned about pain control, large amount of bleeding, blood clots, constipation, or if you experience signs of infection (fever, growing tenderness at the surgery site, a large amount of drainage, severe pain, foul-smelling drainage, redness or swelling.     When to call - Reach out to Urgent Care    If you are experiencing uncontrolled Nausea and Vomiting, uncontrolled pain, inability to urinate and uncomfortable, and in need of " immediate care, and you are NOT able to reach your Surgeon Team, go to an Urgent Care clinic. Do NOT go to the Emergency Room unless you have shortness of breath, chest pain, or other signs of a medical emergency.     When to call - Reasons to Call 911    Call 911 immediately if you experience sudden-onset chest pain, arm weakness/numbness, slurred speech, or shortness of breath     Symptoms - Fever Management    A low grade fever can be expected after surgery. Your Provider many have prescribed an Opioid pain medication that also contains acetaminophen (TYLENOL) that may help with Fever management.  Do NOT take additional acetaminophen (TYLENOL) in combination with an Opioid/acetaminophen (TYLENOL) product. Read the labels on your Over The Counter (OTC) medications with care.     Symptoms - Reduced Urine Output    If it has been greater than 8 hours since you have urinated despite drinking plenty of water, call your Surgeon Team.     No driving or operating machinery    Do NOT drive any vehicle or operate mechanical equipment for 24 hours following the end of your surgery.  Even though you may feel normal, your reactions may be affected by Anesthesia medication you received.     No Alcohol    Do NOT drink alcoholic beverages for 24 hours following your surgery and while taking pain medications.     Diet Instructions    Follow your surgeon's orders for any diet restrictions.  If you did not receive any diet restrictions, you may drink clear liquids (apple juice, ginger ale, 7-up, broth, etc.), and progress to your regular diet as you feel able. It is important to stay well-hydrated after surgery and drink plenty of water.     Discharge Instructions - Comfort and Pain Management    Pain after surgery is normal and expected. You will have some amount of pain after surgery. Your pain will improve with time. There are several things you can do to help reduce your pain including: rest, ice, and using pain medications as  needed. Use pain interventions and don't wait until pain level is out of control. Contact your Surgeon Team if you have pain that persists or worsens after surgery despite rest, ice, and taking your medication(s) as prescribed. You may have a dry mouth, a sore throat, muscles aches or trouble sleeping, and these symptoms should go away after 24 hours.     Discharge Instructions - Rest    Rest and relax for the next 24 hours. Make arrangements to have someone stay with you overnight, and avoid hazardous and strenuous activities.  Do NOT make any important decisions for the next 24 hours.     Discharge Medications   Current Discharge Medication List        START taking these medications    Details   ondansetron (ZOFRAN ODT) 4 MG ODT tab Take 1 tablet (4 mg) by mouth every 8 hours as needed for nausea.  Qty: 10 tablet, Refills: 0    Associated Diagnoses: Acute cholecystitis      oxyCODONE (ROXICODONE) 5 MG tablet Take 1-2 tablets (5-10 mg) by mouth every 4 hours as needed for moderate to severe pain.  Qty: 20 tablet, Refills: 0    Associated Diagnoses: Acute cholecystitis           CONTINUE these medications which have NOT CHANGED    Details   estradiol (VIVELLE-DOT) 0.05 MG/24HR bi-weekly patch Place 1 patch onto the skin twice a week.  Qty: 24 patch, Refills: 2    Associated Diagnoses: Symptomatic menopausal or female climacteric states      LORazepam (ATIVAN) 0.5 MG tablet Take 1 tablet (0.5 mg) by mouth every 8 hours as needed for anxiety  Qty: 30 tablet, Refills: 0    Associated Diagnoses: ANGI (generalized anxiety disorder)      Multiple Vitamin (MULTI-VITAMIN DAILY PO) Take 1 tablet by mouth daily.      traMADol (ULTRAM) 50 MG tablet Take 50 mg by mouth every 6 hours as needed for moderate pain      traZODone (DESYREL) 100 MG tablet Take 100 mg by mouth nightly as needed for sleep.             Allergies   Allergies   Allergen Reactions    Morphine Swelling    Sulfa Antibiotics      Septra rash and hives noted on   visit of 3-12-00.     Data   {Hospitalist Lab Pick List (Most refreshable):024256}  {Image Import Pick List:447815}       Limited abdominal ultrasound.    FINDINGS:    GALLBLADDER: Distended gallbladder with no cholelithiasis or pericholecystic fluid. Mild gallbladder wall thickening measuring up to 4 mm. Sonographic Benjamin's sign is positive.    BILE DUCTS: No biliary dilatation. The common duct measures 3 mm.    LIVER: Normal parenchyma with smooth contour. No focal mass.    RIGHT KIDNEY: No hydronephrosis.    PANCREAS: The visualized portions are normal.    No ascites.      Impression    IMPRESSION:  1.  Distended gallbladder with mild diffuse gallbladder wall thickening and positive sonographic Benjamin's sign. No shadowing stone visualized. Findings are indeterminate, cannot exclude acute acalculus cholecystitis.       XR Surgery JAMARCUS L/T 5 Min Fluoro    Narrative    XR SURGERY JAMARCUS FLUORO LESS THAN 5 MIN 1/2/2025 4:52 PM    HISTORY: lap lori w/grams    COMPARISON: Abdominal ultrasound earlier today    Fluoro time: .1 minutes      Impression    IMPRESSION: Intraoperative cholangiogram. No biliary ductal dilatation  or obstruction. No filling defects in the bile ducts.    HILARY NANCE MD         SYSTEM ID:  [id number]

## 2025-01-06 LAB
PATH REPORT.COMMENTS IMP SPEC: NORMAL
PATH REPORT.COMMENTS IMP SPEC: NORMAL
PATH REPORT.FINAL DX SPEC: NORMAL
PATH REPORT.GROSS SPEC: NORMAL
PATH REPORT.MICROSCOPIC SPEC OTHER STN: NORMAL
PATH REPORT.RELEVANT HX SPEC: NORMAL
PHOTO IMAGE: NORMAL

## 2025-01-16 ENCOUNTER — TELEPHONE (OUTPATIENT)
Dept: SURGERY | Facility: CLINIC | Age: 65
End: 2025-01-16
Payer: COMMERCIAL

## 2025-01-16 NOTE — TELEPHONE ENCOUNTER
S/p laparoscopic cholecystectomy  Procedure date: 1/2/25  Surgeon: Dr. Rincon     Patient reports some bruising old yellowish bruising at and just below her RUQ incision.     also notes some firm swelling a few centimeters in width just above the incision site.    She also notes that she has some worsening pain at this site that started two days ago.      She denies fever or chills.  No redness or drainage from incisions.   Denies any strenuous or unusual activity.      Discussed that area of firm swelling may represent a resolving small hematoma vs small fluid collection or possible healing ridge.      Patient remains concerned about increased pain at this site recently and would like to be seen in clinic for reassurance.      Scheduled patient for post-op appointment with clinic PA tomorrow at 11am.  Clinic address / location verified.

## 2025-01-17 ENCOUNTER — OFFICE VISIT (OUTPATIENT)
Dept: SURGERY | Facility: CLINIC | Age: 65
End: 2025-01-17
Payer: COMMERCIAL

## 2025-01-17 VITALS
BODY MASS INDEX: 21.95 KG/M2 | DIASTOLIC BLOOD PRESSURE: 66 MMHG | WEIGHT: 120 LBS | SYSTOLIC BLOOD PRESSURE: 108 MMHG | OXYGEN SATURATION: 97 % | HEART RATE: 73 BPM | RESPIRATION RATE: 16 BRPM

## 2025-01-17 DIAGNOSIS — Z09 SURGICAL FOLLOWUP: Primary | ICD-10-CM

## 2025-01-17 PROCEDURE — 99024 POSTOP FOLLOW-UP VISIT: CPT

## 2025-01-17 NOTE — PROGRESS NOTES
[facility] Clinic Note     Subjective:  Nathalie Mendosa is here for her first postoperative visit. She underwent a laparoscopic cholecystectomy with choleangiogram by Dr. Rincon on 1/2/2025. Today she tells me she is doing well.  She is eating a normal diet. She currently does not need any pain medications.  Her only concern is some mild pain and swelling between her angio site and epigastric site.  Pain and swelling are already improving.  She does have constipation at baseline.  Symptoms seemed to improve after having bowel movement.     Objective:  Abd - soft, non-tender.   Incision - Healing well and without signs of infection    Pathology: chronic inflammation - discussed with patient    Assessment:  S/p laparoscopic cholecystectomy.     Plan:  Discussed her pain, located in the right upper quadrant, may be related to stool passing through the colon and stretching that area where the colon passed by the operative area.     She was advised to slowly and gradually resume all normal activities. She was instructed to call if fever, increasing pain, redness or drainage at the incision sites occurs.  Otherwise she will follow up with Sandoval Lamb for her regular medical needs.    Jacy Franks PA-C

## 2025-05-05 ENCOUNTER — MYC REFILL (OUTPATIENT)
Dept: FAMILY MEDICINE | Facility: CLINIC | Age: 65
End: 2025-05-05

## 2025-05-05 DIAGNOSIS — G47.00 INSOMNIA, UNSPECIFIED TYPE: Primary | ICD-10-CM

## 2025-05-05 RX ORDER — TRAZODONE HYDROCHLORIDE 100 MG/1
100 TABLET ORAL
Qty: 90 TABLET | Refills: 1 | Status: SHIPPED | OUTPATIENT
Start: 2025-05-05

## 2025-05-05 NOTE — TELEPHONE ENCOUNTER
Nathalie Mendosa is requesting a refill of:    Pending Prescriptions:                       Disp   Refills    traZODone (DESYREL) 100 MG tablet         90 tab*1            Sig: Take 1 tablet (100 mg) by mouth nightly as needed           for sleep.    Please close encounter if RX was sent. Thanks, Jory

## 2025-05-07 DIAGNOSIS — N95.1 SYMPTOMATIC MENOPAUSAL OR FEMALE CLIMACTERIC STATES: ICD-10-CM

## 2025-05-08 RX ORDER — ESTRADIOL 0.05 MG/D
1 PATCH, EXTENDED RELEASE TRANSDERMAL
Qty: 24 PATCH | Refills: 1 | Status: SHIPPED | OUTPATIENT
Start: 2025-05-08

## 2025-05-08 NOTE — TELEPHONE ENCOUNTER
Nathalie Mendosa is requesting a refill of:    Pending Prescriptions:                       Disp   Refills    estradiol (VIVELLE-DOT) 0.05 MG/24HR bi-w*24 pat*1            Sig: APPLY 1 PATCH TOPICALLY TO THE SKIN 2 TIMES A           WEEK    Please close encounter if RX was sent. Thanks, Jory

## (undated) DEVICE — GLOVE BIOGEL PI MICRO SZ 7.5 48575

## (undated) DEVICE — CONNECTOR STOPCOCK 3 WAY MALE LL HI-FLO MX9311L

## (undated) DEVICE — SOL NACL 0.9% INJ 250ML BAG 2B1322Q

## (undated) DEVICE — ESU PENCIL W/HOLSTER E2350H

## (undated) DEVICE — SOL NACL 0.9% IRRIG 1000ML BOTTLE 2F7124

## (undated) DEVICE — CONTRAST ISOVUE 300 61% IOPAMIDOL 10X30ML VIAL 131525

## (undated) DEVICE — Device

## (undated) DEVICE — DECANTER VIAL 2006S

## (undated) DEVICE — CATH IV ANGIO INTRO 12GA 382277

## (undated) DEVICE — ENDO TROCAR FIRST ENTRY KII FIOS Z-THRD 05X100MM CTF03

## (undated) DEVICE — ENDO TROCAR SLEEVE KII Z-THREADED 05X100MM CTS02

## (undated) DEVICE — SUCTION IRR STRYKERFLOW II W/TIP 250-070-520

## (undated) DEVICE — BAG CLEAR TRASH 1.3M 39X33" P4040C

## (undated) DEVICE — CATH CHOLANGIOGRAM 4.5FR TAUT METAL TIP 20018-M55

## (undated) DEVICE — LINEN FULL SHEET 5511

## (undated) DEVICE — SYR 30ML LL W/O NDL 302832

## (undated) DEVICE — ENDO POUCH UNIV RETRIEVAL SYSTEM INZII 10MM CD001

## (undated) DEVICE — TUBING IV EXTENSION SET ANESTHESIA 34" MLL 2C6227

## (undated) DEVICE — DECANTER BAG 2002S

## (undated) DEVICE — ENDO TROCAR OPTICAL ACCESS KII Z-THRD 12X100MM C0R85

## (undated) DEVICE — LINEN POUCH DBL 5427

## (undated) DEVICE — LINEN TOWEL PACK X5 5464

## (undated) DEVICE — SOL NACL 0.9% IRRIG 3000ML BAG 2B7477

## (undated) DEVICE — PREP CHLORAPREP 26ML TINTED HI-LITE ORANGE 930815

## (undated) DEVICE — SUTURE VICRYL+ 0 CT-2 18" VCP727H

## (undated) DEVICE — ESU CORD MONOPOLAR 10'  E0510

## (undated) DEVICE — ESU GROUND PAD ADULT W/CORD E7507

## (undated) DEVICE — GLOVE BIOGEL PI MICRO SZ 8.0 48580

## (undated) DEVICE — ESU ELEC BLADE 2.75" COATED/INSULATED E1455

## (undated) DEVICE — DRAPE C-ARM 60X42" 1013

## (undated) DEVICE — DRAPE LEGGINGS 8421

## (undated) DEVICE — LINEN HALF SHEET 5512

## (undated) DEVICE — CLIP APPLIER ENDO ROTATING 10MM MED/LG ER320

## (undated) DEVICE — SU VICRYL 4-0 P-3 18" UND J494G

## (undated) RX ORDER — LABETALOL HYDROCHLORIDE 5 MG/ML
INJECTION, SOLUTION INTRAVENOUS
Status: DISPENSED
Start: 2025-01-02

## (undated) RX ORDER — PROPOFOL 10 MG/ML
INJECTION, EMULSION INTRAVENOUS
Status: DISPENSED
Start: 2025-01-02

## (undated) RX ORDER — OXYCODONE HYDROCHLORIDE 5 MG/1
TABLET ORAL
Status: DISPENSED
Start: 2025-01-02

## (undated) RX ORDER — ONDANSETRON 2 MG/ML
INJECTION INTRAMUSCULAR; INTRAVENOUS
Status: DISPENSED
Start: 2025-01-02

## (undated) RX ORDER — FENTANYL CITRATE 50 UG/ML
INJECTION, SOLUTION INTRAMUSCULAR; INTRAVENOUS
Status: DISPENSED
Start: 2025-01-02

## (undated) RX ORDER — DEXAMETHASONE SODIUM PHOSPHATE 4 MG/ML
INJECTION, SOLUTION INTRA-ARTICULAR; INTRALESIONAL; INTRAMUSCULAR; INTRAVENOUS; SOFT TISSUE
Status: DISPENSED
Start: 2025-01-02

## (undated) RX ORDER — CEFAZOLIN SODIUM/WATER 2 G/20 ML
SYRINGE (ML) INTRAVENOUS
Status: DISPENSED
Start: 2025-01-02

## (undated) RX ORDER — BUPIVACAINE HYDROCHLORIDE AND EPINEPHRINE 5; 5 MG/ML; UG/ML
INJECTION, SOLUTION EPIDURAL; INTRACAUDAL; PERINEURAL
Status: DISPENSED
Start: 2025-01-02

## (undated) RX ORDER — GLYCOPYRROLATE 0.2 MG/ML
INJECTION, SOLUTION INTRAMUSCULAR; INTRAVENOUS
Status: DISPENSED
Start: 2025-01-02

## (undated) RX ORDER — KETOROLAC TROMETHAMINE 30 MG/ML
INJECTION, SOLUTION INTRAMUSCULAR; INTRAVENOUS
Status: DISPENSED
Start: 2025-01-02

## (undated) RX ORDER — INDOCYANINE GREEN AND WATER 25 MG
KIT INJECTION
Status: DISPENSED
Start: 2025-01-02

## (undated) RX ORDER — LIDOCAINE HYDROCHLORIDE 10 MG/ML
INJECTION, SOLUTION EPIDURAL; INFILTRATION; INTRACAUDAL; PERINEURAL
Status: DISPENSED
Start: 2025-01-02